# Patient Record
Sex: FEMALE | Race: BLACK OR AFRICAN AMERICAN | Employment: OTHER | URBAN - METROPOLITAN AREA
[De-identification: names, ages, dates, MRNs, and addresses within clinical notes are randomized per-mention and may not be internally consistent; named-entity substitution may affect disease eponyms.]

---

## 2017-01-25 RX ORDER — GLIPIZIDE 5 MG/1
TABLET ORAL
Qty: 60 TAB | Refills: 3 | Status: SHIPPED | COMMUNITY
Start: 2017-01-25 | End: 2017-05-24 | Stop reason: SDUPTHER

## 2017-01-31 RX ORDER — AMLODIPINE BESYLATE 10 MG/1
TABLET ORAL
Qty: 30 TAB | Refills: 2 | Status: SHIPPED | OUTPATIENT
Start: 2017-01-31 | End: 2017-04-18 | Stop reason: SDUPTHER

## 2017-02-26 DIAGNOSIS — E87.6 HYPOKALEMIA: ICD-10-CM

## 2017-02-26 RX ORDER — POTASSIUM CHLORIDE 1500 MG/1
TABLET, EXTENDED RELEASE ORAL
Qty: 60 TAB | Refills: 3 | Status: SHIPPED | COMMUNITY
Start: 2017-02-26 | End: 2017-06-26 | Stop reason: SDUPTHER

## 2017-03-29 ENCOUNTER — OFFICE VISIT (OUTPATIENT)
Dept: FAMILY MEDICINE CLINIC | Age: 62
End: 2017-03-29

## 2017-03-29 VITALS
SYSTOLIC BLOOD PRESSURE: 138 MMHG | RESPIRATION RATE: 16 BRPM | OXYGEN SATURATION: 99 % | TEMPERATURE: 98.7 F | BODY MASS INDEX: 29.95 KG/M2 | HEIGHT: 63 IN | WEIGHT: 169 LBS | HEART RATE: 62 BPM | DIASTOLIC BLOOD PRESSURE: 74 MMHG

## 2017-03-29 DIAGNOSIS — B37.2 CANDIDIASIS, INTERTRIGO: ICD-10-CM

## 2017-03-29 DIAGNOSIS — E11.9 TYPE 2 DIABETES MELLITUS WITHOUT COMPLICATION, WITHOUT LONG-TERM CURRENT USE OF INSULIN (HCC): ICD-10-CM

## 2017-03-29 DIAGNOSIS — E89.0 HYPOTHYROIDISM, POSTSURGICAL: ICD-10-CM

## 2017-03-29 DIAGNOSIS — E78.5 HYPERLIPIDEMIA WITH TARGET LDL LESS THAN 100: ICD-10-CM

## 2017-03-29 DIAGNOSIS — I10 ESSENTIAL HYPERTENSION WITH GOAL BLOOD PRESSURE LESS THAN 130/80: Primary | ICD-10-CM

## 2017-03-29 RX ORDER — METFORMIN HYDROCHLORIDE 500 MG/1
TABLET ORAL
Qty: 180 TAB | Refills: 1 | Status: ON HOLD | OUTPATIENT
Start: 2017-03-29 | End: 2017-06-23

## 2017-03-29 RX ORDER — PRAVASTATIN SODIUM 10 MG/1
10 TABLET ORAL
Qty: 90 TAB | Refills: 1 | Status: SHIPPED | OUTPATIENT
Start: 2017-03-29 | End: 2017-10-26 | Stop reason: SDUPTHER

## 2017-03-29 NOTE — PROGRESS NOTES
Chief Complaint   Patient presents with    Diabetes     Follow up, Fasting     Cholesterol Problem    Hypertension    Thyroid Problem     1. Have you been to the ER, urgent care clinic since your last visit? Hospitalized since your last visit? No     2. Have you seen or consulted any other health care providers outside of the [address] [place] [address] since your last visit? Include any pap smears or colon screening.    No

## 2017-03-29 NOTE — PATIENT INSTRUCTIONS

## 2017-03-29 NOTE — MR AVS SNAPSHOT
Visit Information Date & Time Provider Department Dept. Phone Encounter #  
 3/29/2017 10:00 AM Karina Leigh, [address] [place] [address] [id number] Follow-up Instructions Return in about 4 months (around 7/29/2017) for fasting, DM, HTN, CHO. Upcoming Health Maintenance Date Due  
 EYE EXAM RETINAL OR DILATED Q1 3/15/1963 Pneumococcal 19-64 Highest Risk (1 of 3 - PCV13) 3/15/1972 DTaP/Tdap/Td series (1 - Tdap) 3/15/1974 ZOSTER VACCINE AGE 60> 3/15/2013 HEMOGLOBIN A1C Q6M 5/28/2017 FOOT EXAM Q1 7/26/2017 MICROALBUMIN Q1 7/26/2017 LIPID PANEL Q1 11/28/2017 PAP AKA CERVICAL CYTOLOGY 5/27/2018 COLONOSCOPY 7/23/2018 BREAST CANCER SCRN MAMMOGRAM 11/28/2018 Allergies as of 3/29/2017  Review Complete On: 3/29/2017 By: Karina Leigh MD  
 No Known Allergies Current Immunizations  Reviewed on 11/28/2016 Name Date Influenza Vaccine (Quad) PF 11/28/2016, 11/24/2015, 10/28/2014 Not reviewed this visit You Were Diagnosed With   
  
 Codes Comments Essential hypertension with goal blood pressure less than 130/80    -  Primary ICD-10-CM: I10 
ICD-9-CM: 401.9 Type 2 diabetes mellitus without complication, without long-term current use of insulin (HCC)     ICD-10-CM: E11.9 ICD-9-CM: 250.00 Hypothyroidism, postsurgical     ICD-10-CM: E89.0 ICD-9-CM: 244.0 Hyperlipidemia with target LDL less than 100     ICD-10-CM: E78.5 ICD-9-CM: 272.4 Candidiasis, intertrigo     ICD-10-CM: B37.2 ICD-9-CM: 112.3 Vitals BP Pulse Temp Resp Height(growth percentile) Weight(growth percentile) 138/74 (BP 1 Location: Right arm, BP Patient Position: Sitting) 62 98.7 °F (37.1 °C) (Oral) 16 5' 3\" (1.6 m) 169 lb (76.7 kg) SpO2 BMI OB Status Smoking Status 99% 29.94 kg/m2 Postmenopausal Never Smoker Vitals History BMI and BSA Data  Body Mass Index Body Surface Area  
 29.94 kg/m 2 1.85 m 2  
  
  
 Preferred Pharmacy Pharmacy Name Phone [facility]/PHARMACY #[id number] Frankel, [address]  [address] [phone] Your Updated Medication List  
  
   
This list is accurate as of: 3/29/17 11:17 AM.  Always use your most recent med list. amLODIPine 10 mg tablet Commonly known as:  Garcia Fanti TAKE 1 TABLET BY MOUTH EVERY DAY  
  
 aspirin delayed-release 81 mg tablet Take  by mouth daily. clotrimazole-betamethasone topical cream  
Commonly known as:  Benetta Euler Use twice  
  
 glipiZIDE 5 mg tablet Commonly known as:  GLUCOTROL  
TAKE 1 TABLET BY MOUTH BEFORE BREAKFAST AND DINNER. KLOR-CON M20 20 mEq tablet Generic drug:  potassium chloride TAKE 1 TABLET BY MOUTH TWICE A DAY  
  
 losartan-hydroCHLOROthiazide 100-12.5 mg per tablet Commonly known as:  HYZAAR  
TAKE ONE TABLET BY MOUTH DAILY  
  
 metFORMIN 500 mg tablet Commonly known as:  GLUCOPHAGE  
TAKE 1 TABLET BY MOUTH TWO (2) TIMES DAILY (WITH MEALS). pravastatin 10 mg tablet Commonly known as:  PRAVACHOL Take 1 Tab by mouth nightly. Prescriptions Sent to Pharmacy Refills  
 metFORMIN (GLUCOPHAGE) 500 mg tablet 1 Sig: TAKE 1 TABLET BY MOUTH TWO (2) TIMES DAILY (WITH MEALS). Class: Normal  
 Pharmacy: [facility]/pharmacy #[id number] Ph #: [phone]  
 pravastatin (PRAVACHOL) 10 mg tablet 1 Sig: Take 1 Tab by mouth nightly. Class: Normal  
 Pharmacy: [address] [place] [id number] [facility] Ph #: [phone] Route: Oral  
  
We Performed the Following HEMOGLOBIN A1C WITH EAG [31785 CPT(R)] LIPID PANEL [04233 CPT(R)] METABOLIC PANEL, COMPREHENSIVE [73107 CPT(R)] GA COLLECTION VENOUS BLOOD,VENIPUNCTURE [id number] CPT(R)] [place] HANDLG&/OR CONVEY OF SPEC FOR TR OFFICE TO LAB [97588 CPT(R)] TSH 3RD GENERATION [41385 CPT(R)] Follow-up Instructions Return in about 4 months (around 7/29/2017) for fasting, DM, HTN, CHO. Patient Instructions Learning About Diabetes Food Guidelines Your Care Instructions Meal planning is important to manage diabetes. It helps keep your blood sugar at a target level (which you set with your doctor). You don't have to eat special foods. You can eat what your family eats, including sweets once in a while. But you do have to pay attention to how often you eat and how much you eat of certain foods. You may want to work with a dietitian or a certified diabetes educator (CDE) to help you plan meals and snacks. A dietitian or CDE can also help you lose weight if that is one of your goals. What should you know about eating carbs? Managing the amount of carbohydrate (carbs) you eat is an important part of healthy meals when you have diabetes. Carbohydrate is found in many foods. · Learn which foods have carbs. And learn the amounts of carbs in different foods. ¨ Bread, cereal, pasta, and rice have about 15 grams of carbs in a serving. A serving is 1 slice of bread (1 ounce), ½ cup of cooked cereal, or 1/3 cup of cooked pasta or rice. ¨ Fruits have 15 grams of carbs in a serving. A serving is 1 small fresh fruit, such as an apple or orange; ½ of a banana; ½ cup of cooked or canned fruit; ½ cup of fruit juice; 1 cup of melon or raspberries; or 2 tablespoons of dried fruit. ¨ Milk and no-sugar-added yogurt have 15 grams of carbs in a serving. A serving is 1 cup of milk or 2/3 cup of no-sugar-added yogurt. ¨ Starchy vegetables have 15 grams of carbs in a serving. A serving is ½ cup of mashed potatoes or sweet potato; 1 cup winter squash; ½ of a small baked potato; ½ cup of cooked beans; or ½ cup cooked corn or green peas. · Learn how much carbs to eat each day and at each meal. A dietitian or CDE can teach you how to keep track of the amount of carbs you eat.  This is called carbohydrate counting. · If you are not sure how to count carbohydrate grams, use the Plate Method to plan meals. It is a good, quick way to make sure that you have a balanced meal. It also helps you spread carbs throughout the day. ¨ Divide your plate by types of foods. Put non-starchy vegetables on half the plate, meat or other protein food on one-quarter of the plate, and a grain or starchy vegetable in the final quarter of the plate. To this you can add a small piece of fruit and 1 cup of milk or yogurt, depending on how many carbs you are supposed to eat at a meal. 
· Try to eat about the same amount of carbs at each meal. Do not \"save up\" your daily allowance of carbs to eat at one meal. 
· Proteins have very little or no carbs per serving. Examples of proteins are beef, chicken, turkey, fish, eggs, tofu, cheese, cottage cheese, and peanut butter. A serving size of meat is 3 ounces, which is about the size of a deck of cards. Examples of meat substitute serving sizes (equal to 1 ounce of meat) are 1/4 cup of cottage cheese, 1 egg, 1 tablespoon of peanut butter, and ½ cup of tofu. How can you eat out and still eat healthy? · Learn to estimate the serving sizes of foods that have carbohydrate. If you measure food at home, it will be easier to estimate the amount in a serving of restaurant food. · If the meal you order has too much carbohydrate (such as potatoes, corn, or baked beans), ask to have a low-carbohydrate food instead. Ask for a salad or green vegetables. · If you use insulin, check your blood sugar before and after eating out to help you plan how much to eat in the future. · If you eat more carbohydrate at a meal than you had planned, take a walk or do other exercise. This will help lower your blood sugar. What else should you know? · Limit saturated fat, such as the fat from meat and dairy products.  This is a healthy choice because people who have diabetes are at higher risk of heart disease. So choose lean cuts of meat and nonfat or low-fat dairy products. Use olive or canola oil instead of butter or shortening when cooking. · Don't skip meals. Your blood sugar may drop too low if you skip meals and take insulin or certain medicines for diabetes. · Check with your doctor before you drink alcohol. Alcohol can cause your blood sugar to drop too low. Alcohol can also cause a bad reaction if you take certain diabetes medicines. Follow-up care is a key part of your treatment and safety. Be sure to make and go to all appointments, and call your doctor if you are having problems. It's also a good idea to know your test results and keep a list of the medicines you take. Where can you learn more? Go to http://jania-chris.info/. Enter O201 in the search box to learn more about \"Learning About Diabetes Food Guidelines. \" Current as of: May 23, 2016 Content Version: 11.2 © 1792-0598 [employer]. Care instructions adapted under license by [employer] (which disclaims liability or warranty for this information). If you have questions about a medical condition or this instruction, always ask your healthcare professional. [place] [address] any warranty or liability for your use of this information. Introducing [facility]! [address] introduces [employer] patient portal. Now you can access parts of your medical record, email your doctor's office, and request medication refills online. 1. In your internet browser, go to [email or web address] 2. Click on the First Time User? Click Here link in the Sign In box. You will see the New Member Sign Up page. 3. Enter your Sign[employer] Access Code exactly as it appears below. You will not need to use this code after youve completed the sign-up process. If you do not sign up before the expiration date, you must request a new code. · [employer] Access Code: [email or web address] Expires: 6/27/2017 11:17 AM 
 
4. Enter the last four digits of your Social Security Number (xxxx) and Date of Birth (mm/dd/yyyy) as indicated and click Submit. You will be taken to the next sign-up page. 5. Create a [employer] ID. This will be your [employer] login ID and cannot be changed, so think of one that is secure and easy to remember. 6. Create a [employer] password. You can change your password at any time. 7. Enter your Password Reset Question and Answer. This can be used at a later time if you forget your password. 8. Enter your e-mail address. You will receive e-mail notification when new information is available in [address] 9. Click Sign Up. You can now view and download portions of your medical record. 10. Click the Download Summary menu link to download a portable copy of your medical information. If you have questions, please visit the Frequently Asked Questions section of the [employer] website. Remember, [employer] is NOT to be used for urgent needs. For medical emergencies, dial 911. Now available from your iPhone and Android! Please provide this summary of care documentation to your next provider. Your primary care clinician is listed as Kelsie Bahena. If you have any questions after today's visit, please call [phone].

## 2017-03-29 NOTE — PROGRESS NOTES
HISTORY OF PRESENT ILLNESS  Viviana Santiago is a 57 y.o. female. She was seen for 4 months follow up on diabetes, hypertension, dyslipidemia. Also h/o subtotal thyroidectomy. HPI  Cardiovascular Review  The patient has hypertension and hyperlipidemia. She reports taking medications as instructed, no medication side effects noted, home BP monitoring in range of 022-044'W systolic over 03-24'H diastolic, no TIA's, no chest pain on exertion, no dyspnea on exertion, no swelling of ankles, no orthostatic dizziness or lightheadedness, no orthopnea or paroxysmal nocturnal dyspnea, no palpitations, no muscle aches or pain. Diet and Lifestyle: generally follows a low fat low cholesterol diet, generally follows a low sodium diet, sedentary. Lab review: labs reviewed and discussed with patient. Medicines: Amlodipine 10 mg, Losartan/hctz 100/12.5, Pravastatin 10 mg, ASA 81 mg  She was referred to cardiology for abnormal EKG, had echocardiogram in 06/2015, normal result    Lab Results   Component Value Date/Time    Cholesterol, total 192 11/28/2016 10:57 AM    HDL Cholesterol 58 11/28/2016 10:57 AM    LDL, calculated 116 11/28/2016 10:57 AM    VLDL, calculated 18 11/28/2016 10:57 AM    Triglyceride 91 11/28/2016 10:57 AM         Endocrine Review  She is seen for diabetes. Since last visit she reports no polyuria or polydipsia, no chest pain, dyspnea or TIA's, no numbness, tingling or pain in extremities, no unusual visual symptoms, no hypoglycemia, weight has decreased. Home glucose monitoring: is performed regularly, fasting values range 100-120. She reports medication compliance: compliant all of the time. Medication side effects: none. Diabetic diet compliance: compliant all of the time. Lab review: labs reviewed and discussed with patient. Eye exam: just had last week, will give call with name of provider.  Urine negative for protein in 07/2016  On Glipizide 5 mg BID and Metformin 500 mg BID  Lab Results Component Value Date/Time    Hemoglobin A1c 6.9 11/28/2016 10:57 AM        Endocrine Review  Patient is seen for followup of hypothyroidism and status post thyroid surgery sub total thyroidectomy. she also had parathyroidectomy at same time  Since last visit: no change   She reports medication compliance: n/a as she is not on Rx  She reports the following concerns/problems/med side effects: n/a. Lab review: labs reviewed and discussed with patient. Lab Results   Component Value Date/Time    TSH 1.710 11/28/2016 10:57 AM    T4, Free 1.25 11/28/2016 10:57 AM       Review of Systems   Constitutional: Negative for chills, fever and malaise/fatigue. HENT: Negative for congestion, ear pain, sore throat and tinnitus. Eyes: Negative for blurred vision, double vision, pain and discharge. Respiratory: Negative for cough, shortness of breath and wheezing. Cardiovascular: Negative for chest pain, palpitations and leg swelling. Gastrointestinal: Negative for abdominal pain, blood in stool, constipation, diarrhea, nausea and vomiting. Genitourinary: Negative for dysuria, frequency, hematuria and urgency. Musculoskeletal: Negative for back pain, joint pain and myalgias. Skin: Negative for rash. Neurological: Negative for dizziness, tremors, seizures and headaches. Endo/Heme/Allergies: Negative for polydipsia. Does not bruise/bleed easily. Psychiatric/Behavioral: Negative for depression and substance abuse. The patient is not nervous/anxious. Physical Exam   Constitutional: She is oriented to person, place, and time. She appears well-developed and well-nourished. HENT:   Head: Normocephalic and atraumatic. Right Ear: External ear normal.   Mouth/Throat: Oropharynx is clear and moist. No oropharyngeal exudate. Eyes: Conjunctivae and EOM are normal. Pupils are equal, round, and reactive to light. No scleral icterus. Neck: Normal range of motion. Neck supple. No JVD present.  No thyromegaly present. Scar of thyroid surgery   Cardiovascular: Normal rate, regular rhythm, normal heart sounds and intact distal pulses. No murmur heard. Pulmonary/Chest: Effort normal and breath sounds normal. She has no wheezes. Abdominal: Soft. Bowel sounds are normal. She exhibits no distension and no mass. Musculoskeletal: Normal range of motion. She exhibits no edema or tenderness. Feet:warm, good capillary refill, no trophic changes or ulcerative lesions, normal DP and PT pulses, normal monofilament exam and normal sensory exam   Lymphadenopathy:     She has no cervical adenopathy. Neurological: She is alert and oriented to person, place, and time. She has normal reflexes. No cranial nerve deficit. Skin: Skin is warm and dry. No rash noted. She is not diaphoretic. Erythematous lesions with raised borders , between buttocks   Psychiatric: She has a normal mood and affect. Nursing note and vitals reviewed. ASSESSMENT and Lynette Andrew was seen today for diabetes, cholesterol problem, hypertension and thyroid problem. Diagnoses and all orders for this visit:    Essential hypertension with goal blood pressure less than 130/80  -     [place] HANDLG&/OR CONVEY OF SPEC FOR TR OFFICE TO LAB  -     [place] COLLECTION VENOUS BLOOD,VENIPUNCTURE  -     METABOLIC PANEL, COMPREHENSIVE    Type 2 diabetes mellitus without complication, without long-term current use of insulin (HCC)  -     metFORMIN (GLUCOPHAGE) 500 mg tablet; TAKE 1 TABLET BY MOUTH TWO (2) TIMES DAILY (WITH MEALS).   -     KY HANDLG&/OR CONVEY OF SPEC FOR TR OFFICE TO LAB  -     KY COLLECTION VENOUS BLOOD,VENIPUNCTURE  -     METABOLIC PANEL, COMPREHENSIVE  -     HEMOGLOBIN A1C WITH EAG    Hypothyroidism, postsurgical  -     KY HANDLG&/OR CONVEY OF SPEC FOR TR OFFICE TO LAB  -     [place] COLLECTION VENOUS BLOOD,VENIPUNCTURE  -     METABOLIC PANEL, COMPREHENSIVE  -     TSH 3RD GENERATION    Hyperlipidemia with target LDL less than 100  -     pravastatin (PRAVACHOL) 10 mg tablet; Take 1 Tab by mouth nightly. -     [place] HANDLG&/OR CONVEY OF SPEC FOR TR OFFICE TO LAB  -     AK COLLECTION VENOUS BLOOD,VENIPUNCTURE  -     METABOLIC PANEL, COMPREHENSIVE  -     LIPID PANEL    Candidiasis, intertrigo    Discussed lifestyle issues and health guidance given  Patient was given an after visit summary which includes diagnoses, vital signs, current medications, instructions and references & authorized prescriptions . Results of labs will be conveyed to patient, once available. Pt verbalized instructions I provided and expressed understanding of discussion that was held today. Follow-up Disposition:  Return in about 4 months (around 7/29/2017) for fasting, DM, HTN, CHO.

## 2017-03-30 LAB
ALBUMIN SERPL-MCNC: 4.1 G/DL (ref 3.6–4.8)
ALBUMIN/GLOB SERPL: 0.9 {RATIO} (ref 1.2–2.2)
ALP SERPL-CCNC: 109 IU/L (ref 39–117)
ALT SERPL-CCNC: 54 IU/L (ref 0–32)
AST SERPL-CCNC: 36 IU/L (ref 0–40)
BILIRUB SERPL-MCNC: 0.4 MG/DL (ref 0–1.2)
BUN SERPL-MCNC: 18 MG/DL (ref 8–27)
BUN/CREAT SERPL: 20 (ref 11–26)
CALCIUM SERPL-MCNC: 10.6 MG/DL (ref 8.7–10.3)
CHLORIDE SERPL-SCNC: 97 MMOL/L (ref 96–106)
CHOLEST SERPL-MCNC: 147 MG/DL (ref 100–199)
CO2 SERPL-SCNC: 28 MMOL/L (ref 18–29)
CREAT SERPL-MCNC: 0.92 MG/DL (ref 0.57–1)
EST. AVERAGE GLUCOSE BLD GHB EST-MCNC: 143 MG/DL
GLOBULIN SER CALC-MCNC: 4.5 G/DL (ref 1.5–4.5)
GLUCOSE SERPL-MCNC: 137 MG/DL (ref 65–99)
HBA1C MFR BLD: 6.6 % (ref 4.8–5.6)
HDLC SERPL-MCNC: 50 MG/DL
INTERPRETATION, 910389: NORMAL
LDLC SERPL CALC-MCNC: 78 MG/DL (ref 0–99)
Lab: NORMAL
POTASSIUM SERPL-SCNC: 3.6 MMOL/L (ref 3.5–5.2)
PROT SERPL-MCNC: 8.6 G/DL (ref 6–8.5)
SODIUM SERPL-SCNC: 141 MMOL/L (ref 134–144)
TRIGL SERPL-MCNC: 93 MG/DL (ref 0–149)
TSH SERPL DL<=0.005 MIU/L-ACNC: 1.12 UIU/ML (ref 0.45–4.5)
VLDLC SERPL CALC-MCNC: 19 MG/DL (ref 5–40)

## 2017-04-18 RX ORDER — AMLODIPINE BESYLATE 10 MG/1
TABLET ORAL
Qty: 30 TAB | Refills: 2 | Status: SHIPPED | OUTPATIENT
Start: 2017-04-18 | End: 2017-07-03 | Stop reason: SDUPTHER

## 2017-05-08 RX ORDER — LOSARTAN POTASSIUM AND HYDROCHLOROTHIAZIDE 12.5; 1 MG/1; MG/1
TABLET ORAL
Qty: 30 TAB | Refills: 5 | Status: SHIPPED | OUTPATIENT
Start: 2017-05-08 | End: 2017-10-23 | Stop reason: SDUPTHER

## 2017-05-29 RX ORDER — GLIPIZIDE 5 MG/1
TABLET ORAL
Qty: 60 TAB | Refills: 3 | Status: SHIPPED | OUTPATIENT
Start: 2017-05-29 | End: 2017-09-30 | Stop reason: SDUPTHER

## 2017-06-21 ENCOUNTER — APPOINTMENT (OUTPATIENT)
Dept: CT IMAGING | Age: 62
End: 2017-06-21
Attending: EMERGENCY MEDICINE
Payer: COMMERCIAL

## 2017-06-21 ENCOUNTER — APPOINTMENT (OUTPATIENT)
Dept: GENERAL RADIOLOGY | Age: 62
End: 2017-06-21
Attending: EMERGENCY MEDICINE
Payer: COMMERCIAL

## 2017-06-21 ENCOUNTER — HOSPITAL ENCOUNTER (OUTPATIENT)
Age: 62
Discharge: LEFT BEFORE DISCHARGE | End: 2017-06-21
Attending: EMERGENCY MEDICINE | Admitting: HOSPITALIST
Payer: COMMERCIAL

## 2017-06-21 VITALS
DIASTOLIC BLOOD PRESSURE: 81 MMHG | HEIGHT: 64 IN | RESPIRATION RATE: 16 BRPM | SYSTOLIC BLOOD PRESSURE: 151 MMHG | BODY MASS INDEX: 28.24 KG/M2 | HEART RATE: 68 BPM | OXYGEN SATURATION: 96 % | WEIGHT: 165.4 LBS | TEMPERATURE: 98.1 F

## 2017-06-21 DIAGNOSIS — R26.81 UNSTEADY GAIT: Primary | ICD-10-CM

## 2017-06-21 DIAGNOSIS — R20.0 NUMBNESS: ICD-10-CM

## 2017-06-21 LAB
ALBUMIN SERPL BCP-MCNC: 3.6 G/DL (ref 3.5–5)
ALBUMIN/GLOB SERPL: 0.6 {RATIO} (ref 1.1–2.2)
ALP SERPL-CCNC: 109 U/L (ref 45–117)
ALT SERPL-CCNC: 19 U/L (ref 12–78)
ANION GAP BLD CALC-SCNC: 7 MMOL/L (ref 5–15)
AST SERPL W P-5'-P-CCNC: 25 U/L (ref 15–37)
BASOPHILS # BLD AUTO: 0 K/UL (ref 0–0.1)
BASOPHILS # BLD: 0 % (ref 0–1)
BILIRUB SERPL-MCNC: 0.4 MG/DL (ref 0.2–1)
BUN SERPL-MCNC: 17 MG/DL (ref 6–20)
BUN/CREAT SERPL: 15 (ref 12–20)
CALCIUM SERPL-MCNC: 10.3 MG/DL (ref 8.5–10.1)
CHLORIDE SERPL-SCNC: 100 MMOL/L (ref 97–108)
CO2 SERPL-SCNC: 29 MMOL/L (ref 21–32)
CREAT SERPL-MCNC: 1.15 MG/DL (ref 0.55–1.02)
EOSINOPHIL # BLD: 0.1 K/UL (ref 0–0.4)
EOSINOPHIL NFR BLD: 2 % (ref 0–7)
ERYTHROCYTE [DISTWIDTH] IN BLOOD BY AUTOMATED COUNT: 12.3 % (ref 11.5–14.5)
GLOBULIN SER CALC-MCNC: 5.8 G/DL (ref 2–4)
GLUCOSE SERPL-MCNC: 169 MG/DL (ref 65–100)
HCT VFR BLD AUTO: 39.5 % (ref 35–47)
HGB BLD-MCNC: 13.2 G/DL (ref 11.5–16)
LYMPHOCYTES # BLD AUTO: 39 % (ref 12–49)
LYMPHOCYTES # BLD: 2.4 K/UL (ref 0.8–3.5)
MCH RBC QN AUTO: 29.9 PG (ref 26–34)
MCHC RBC AUTO-ENTMCNC: 33.4 G/DL (ref 30–36.5)
MCV RBC AUTO: 89.6 FL (ref 80–99)
MONOCYTES # BLD: 0.6 K/UL (ref 0–1)
MONOCYTES NFR BLD AUTO: 10 % (ref 5–13)
NEUTS SEG # BLD: 3.2 K/UL (ref 1.8–8)
NEUTS SEG NFR BLD AUTO: 49 % (ref 32–75)
PLATELET # BLD AUTO: 205 K/UL (ref 150–400)
POTASSIUM SERPL-SCNC: 4 MMOL/L (ref 3.5–5.1)
PROT SERPL-MCNC: 9.4 G/DL (ref 6.4–8.2)
RBC # BLD AUTO: 4.41 M/UL (ref 3.8–5.2)
SODIUM SERPL-SCNC: 136 MMOL/L (ref 136–145)
WBC # BLD AUTO: 6.3 K/UL (ref 3.6–11)

## 2017-06-21 PROCEDURE — 36415 COLL VENOUS BLD VENIPUNCTURE: CPT | Performed by: EMERGENCY MEDICINE

## 2017-06-21 PROCEDURE — 71020 XR CHEST PA LAT: CPT

## 2017-06-21 PROCEDURE — 85025 COMPLETE CBC W/AUTO DIFF WBC: CPT | Performed by: EMERGENCY MEDICINE

## 2017-06-21 PROCEDURE — 99284 EMERGENCY DEPT VISIT MOD MDM: CPT

## 2017-06-21 PROCEDURE — 80053 COMPREHEN METABOLIC PANEL: CPT | Performed by: EMERGENCY MEDICINE

## 2017-06-21 PROCEDURE — 70450 CT HEAD/BRAIN W/O DYE: CPT

## 2017-06-21 PROCEDURE — 99218 HC RM OBSERVATION: CPT

## 2017-06-21 NOTE — PROGRESS NOTES
Admission Medication Reconciliation:    Information obtained from: Patient, RX Query    Significant PMH/Disease States:   Past Medical History:   Diagnosis Date    Diabetes ([facility] [address]     Hypertension     Echo 6-25-15- borderline LVH, normal EF- CAV     Hypothyroid        Chief Complaint for this Admission:  Right facial/arm numbness and tingling x 1 week    Allergies:  Review of patient's allergies indicates no known allergies. Prior to Admission Medications:   Prior to Admission Medications   Prescriptions Last Dose Informant Patient Reported? Taking? KLOR-CON M20 20 mEq tablet 6/21/2017 at 0800  No Yes   Sig: TAKE 1 TABLET BY MOUTH TWICE A DAY   amLODIPine (NORVASC) 10 mg tablet 6/21/2017 at 0900  No Yes   Sig: TAKE 1 TABLET BY MOUTH EVERY DAY   aspirin delayed-release 81 mg tablet 6/21/2017 at 0900  Yes Yes   Sig: Take  by mouth daily. glipiZIDE (GLUCOTROL) 5 mg tablet 6/21/2017 at 0800  No Yes   Sig: TAKE 1 TABLET BY MOUTH BEFORE BREAKFAST AND DINNER. losartan-hydroCHLOROthiazide (HYZAAR) 100-12.5 mg per tablet 6/21/2017 at 0800  No Yes   Sig: TAKE ONE TABLET BY MOUTH DAILY   metFORMIN (GLUCOPHAGE) 500 mg tablet 6/21/2017 at 0800  No Yes   Sig: TAKE 1 TABLET BY MOUTH TWO (2) TIMES DAILY (WITH MEALS). pravastatin (PRAVACHOL) 10 mg tablet 6/20/2017 at 2000  No Yes   Sig: Take 1 Tab by mouth nightly. Facility-Administered Medications: None         Comments/Recommendations:   Deleted:  1. Lotrisone (uses \"occasionally\")    Thank you.

## 2017-06-21 NOTE — ED NOTES
\" I need to go to the bathroom before you get started\". Patient ambulated to the bathroom steady gait.

## 2017-06-21 NOTE — ED PROVIDER NOTES
HPI Comments: 57 y.o. female with past medical history significant for HTN, DM, hypothyroidism, and is s/p cholecystectomy, thyroidectomy, and parathyroidectomy who presents from home via private vehicle with chief complaint of right arm numbness. Pt reports numbness over her entire right arm from shoulder to fingers that is like \"playing in sand\" for the last 1 - 2 weeks. She also notes an unsteady gait every time she stands up, not feeling as if she is leaning to one side or the other, and blurry vision in all fields over the same time period. Pt has not yet been evaluated by another physician for this complaint. Pt denies the following: weakness, light-headedness, LOC, speech difficulty, fever, chills, rhinorrhea, sore throat, cough, SOB, abdominal pain, and any changes to urine or bowels. There are no other acute medical concerns at this time. Social hx: Never smoker. No alcohol use. PCP: Keegan Juarez MD    Note written by Juliette Beck, as dictated by Kassy Herndon MD 11:28 AM     The history is provided by the patient. Past Medical History:   Diagnosis Date    Diabetes ([facility] [address]     Hypertension     Echo 6-25-15- borderline LVH, normal EF- CAV     Hypothyroid        Past Surgical History:   Procedure Laterality Date    HX  SECTION          HX CHOLECYSTECTOMY      HX COLONOSCOPY      HX PARATHYROIDECTOMY      had 2 glands removed    HX THYROIDECTOMY           Family History:   Problem Relation Age of Onset    Hypertension Mother     Hypertension Brother     Hypertension Maternal Aunt     No Known Problems Father        Social History     Social History    Marital status:      Spouse name: N/A    Number of children: N/A    Years of education: N/A     Occupational History    Not on file.      Social History Main Topics    Smoking status: Never Smoker    Smokeless tobacco: Never Used    Alcohol use No    Drug use: No    Sexual activity: Not on file     Other Topics Concern    Not on file     Social History Narrative         ALLERGIES: Review of patient's allergies indicates no known allergies. Review of Systems   Constitutional: Negative for chills and fever. HENT: Negative for rhinorrhea and sore throat. Eyes: Positive for visual disturbance (blurry). Respiratory: Negative for cough and shortness of breath. Cardiovascular: Negative for chest pain. Gastrointestinal: Negative for abdominal pain, diarrhea, nausea and vomiting. Genitourinary: Negative for dysuria and urgency. Musculoskeletal: Positive for gait problem (unsteady). Negative for arthralgias and back pain. Skin: Negative for rash. Neurological: Positive for numbness (right arm). Negative for dizziness, weakness and light-headedness. All other systems reviewed and are negative.       Vitals:    06/21/17 1054 06/21/17 1112   BP: (!) 194/98 159/86   Pulse: 70    Resp: 18    Temp: 98 °F (36.7 °C)    SpO2: 97%    Weight: 75 kg (165 lb 6.4 oz)    Height: 5' 3.5\" (1.613 m)             Physical Exam     Const:  No acute distress, well developed, well nourished  Head:  Atraumatic, normocephalic  Eyes:  PERRL, conjunctiva normal, no scleral icterus  Neck:  Supple, trachea midline  Cardiovascular:  RRR, no murmurs, no gallops, no rubs  Resp:  No resp distress, no increased work of breathing, no wheezes, no rhonchi, no rales,  Abd:  Soft, non-tender, non-distended, no rebound, no guarding, no CVA tenderness  :  Deferred  MSK:  No pedal edema, normal ROM  Neuro:  Alert and oriented x3, no cranial nerve defect  Skin:  Warm, dry, intact  Psych: normal mood and affect, behavior is normal, judgement and thought content is normal    Note written by Juliette Clarke, as dictated by Efrain Resendez MD 11:28 AM      MDM  Number of Diagnoses or Management Options  Numbness:   Unsteady gait:      Amount and/or Complexity of Data Reviewed  Clinical lab tests: ordered and reviewed  Tests in the radiology section of CPT®: ordered and reviewed  Review and summarize past medical records: yes    Patient Progress  Patient progress: stable    ED Course     Pt. Presents to the ER with difficulty walking and right sided numbness. No mass or bleed on head CT. Pt. Recommended to be admitted for stroke work-up. She declined. She says that she plans to return tomorrow morning. I told her that she is at risk for permanent disability or death. Pt. Still wishes to leave. Procedures      CONSULT NOTE:  1:08 PM Maximiliano Ayala MD spoke with Dr. Fay Espinoza, Consult for Hospitalist.  Discussed available diagnostic tests and clinical findings. He is in agreement with care plans as outlined. Dr. Fay Espinoza will come to see the pt and plans to admit. PROGRESS NOTE:  1:21 PM  Pt was seen by Dr. Fay Espinoza but reports that she is unable to stay in the hospital as she is the caregiver for her ill . She understands the risk that if she goes home she could have another stroke and die. Pt will be leaving AMA and will try to f/u with PCP or come back to the ED tomorrow.

## 2017-06-21 NOTE — ED TRIAGE NOTES
Pt arrives with unsteady gait, R facial and R arm numbness and tingling x 1 week. Denies history of stroke. VSS.

## 2017-06-21 NOTE — ED NOTES
Pt ambulatory out of unit. VSS. Pt understanding that if she comes back tomorrow to follow up on care then she will need to be reevaluated.

## 2017-06-21 NOTE — ED NOTES
Verbal shift change report given to Camron Worley RN & Juan Estrada RN (oncoming nurse) by Ingrid Patel RN (offgoing nurse). Report included the following information SBAR, ED Summary, MAR and Recent Results.

## 2017-06-21 NOTE — Clinical Note
Patient Class[de-identified] Observation [373] Type of Bed: Telemetry [19] Reason for Observation: unsteady gait, righ face and arm numbness Admitting Physician: Estefanía Singh [[id number]] Attending Physician: Estefanía Singh [[id number]] Diagnosis: stroke work up

## 2017-06-21 NOTE — ED NOTES
Pt reports that she is unable to stay overnight due to her being the primary caregiver of her , who is disabled. MD notified and AMA form signed.

## 2017-06-21 NOTE — PROGRESS NOTES
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 I explained to her the importance of inpatient work up, she understood but because of her home situation she said she can't stay and wants to go home. Case discussed with the ER physician and he said he will talk to her.    Discharge per ER physician   Dr Flaquito Hernandez

## 2017-06-22 ENCOUNTER — APPOINTMENT (OUTPATIENT)
Dept: MRI IMAGING | Age: 62
End: 2017-06-22
Attending: FAMILY MEDICINE
Payer: COMMERCIAL

## 2017-06-22 ENCOUNTER — HOSPITAL ENCOUNTER (OUTPATIENT)
Age: 62
Setting detail: OBSERVATION
Discharge: HOME | End: 2017-06-23
Attending: STUDENT IN AN ORGANIZED HEALTH CARE EDUCATION/TRAINING PROGRAM | Admitting: FAMILY MEDICINE
Payer: COMMERCIAL

## 2017-06-22 DIAGNOSIS — E11.9 TYPE 2 DIABETES MELLITUS WITHOUT COMPLICATION, WITHOUT LONG-TERM CURRENT USE OF INSULIN (HCC): ICD-10-CM

## 2017-06-22 DIAGNOSIS — R26.9 GAIT ABNORMALITY: ICD-10-CM

## 2017-06-22 DIAGNOSIS — I63.9 CEREBROVASCULAR ACCIDENT (CVA), UNSPECIFIED MECHANISM (HCC): Primary | ICD-10-CM

## 2017-06-22 DIAGNOSIS — R20.0 NUMBNESS: ICD-10-CM

## 2017-06-22 DIAGNOSIS — R42 DIZZINESS: ICD-10-CM

## 2017-06-22 LAB
ALBUMIN SERPL BCP-MCNC: 3.5 G/DL (ref 3.5–5)
ALBUMIN SERPL BCP-MCNC: 3.7 G/DL (ref 3.5–5)
ALBUMIN/GLOB SERPL: 0.6 {RATIO} (ref 1.1–2.2)
ALBUMIN/GLOB SERPL: 0.7 {RATIO} (ref 1.1–2.2)
ALP SERPL-CCNC: 106 U/L (ref 45–117)
ALP SERPL-CCNC: 108 U/L (ref 45–117)
ALT SERPL-CCNC: 16 U/L (ref 12–78)
ALT SERPL-CCNC: 18 U/L (ref 12–78)
ANION GAP BLD CALC-SCNC: 9 MMOL/L (ref 5–15)
ANION GAP BLD CALC-SCNC: 9 MMOL/L (ref 5–15)
AST SERPL W P-5'-P-CCNC: 13 U/L (ref 15–37)
AST SERPL W P-5'-P-CCNC: 13 U/L (ref 15–37)
ATRIAL RATE: 61 BPM
BASOPHILS # BLD AUTO: 0 K/UL (ref 0–0.1)
BASOPHILS # BLD: 0 % (ref 0–1)
BILIRUB SERPL-MCNC: 0.6 MG/DL (ref 0.2–1)
BILIRUB SERPL-MCNC: 0.6 MG/DL (ref 0.2–1)
BUN SERPL-MCNC: 19 MG/DL (ref 6–20)
BUN SERPL-MCNC: 20 MG/DL (ref 6–20)
BUN/CREAT SERPL: 15 (ref 12–20)
BUN/CREAT SERPL: 15 (ref 12–20)
CALCIUM SERPL-MCNC: 10.1 MG/DL (ref 8.5–10.1)
CALCIUM SERPL-MCNC: 10.2 MG/DL (ref 8.5–10.1)
CALCULATED P AXIS, ECG09: 71 DEGREES
CALCULATED R AXIS, ECG10: 18 DEGREES
CALCULATED T AXIS, ECG11: 146 DEGREES
CHLORIDE SERPL-SCNC: 102 MMOL/L (ref 97–108)
CHLORIDE SERPL-SCNC: 103 MMOL/L (ref 97–108)
CO2 SERPL-SCNC: 25 MMOL/L (ref 21–32)
CO2 SERPL-SCNC: 26 MMOL/L (ref 21–32)
CREAT SERPL-MCNC: 1.31 MG/DL (ref 0.55–1.02)
CREAT SERPL-MCNC: 1.34 MG/DL (ref 0.55–1.02)
DIAGNOSIS, 93000: NORMAL
EOSINOPHIL # BLD: 0.1 K/UL (ref 0–0.4)
EOSINOPHIL NFR BLD: 1 % (ref 0–7)
ERYTHROCYTE [DISTWIDTH] IN BLOOD BY AUTOMATED COUNT: 12.3 % (ref 11.5–14.5)
GLOBULIN SER CALC-MCNC: 5.3 G/DL (ref 2–4)
GLOBULIN SER CALC-MCNC: 5.4 G/DL (ref 2–4)
GLUCOSE BLD STRIP.AUTO-MCNC: 110 MG/DL (ref 65–100)
GLUCOSE BLD STRIP.AUTO-MCNC: 94 MG/DL (ref 65–100)
GLUCOSE SERPL-MCNC: 118 MG/DL (ref 65–100)
GLUCOSE SERPL-MCNC: 135 MG/DL (ref 65–100)
HCT VFR BLD AUTO: 39.3 % (ref 35–47)
HGB BLD-MCNC: 12.9 G/DL (ref 11.5–16)
LYMPHOCYTES # BLD AUTO: 42 % (ref 12–49)
LYMPHOCYTES # BLD: 3.2 K/UL (ref 0.8–3.5)
MAGNESIUM SERPL-MCNC: 2 MG/DL (ref 1.6–2.4)
MCH RBC QN AUTO: 29.5 PG (ref 26–34)
MCHC RBC AUTO-ENTMCNC: 32.8 G/DL (ref 30–36.5)
MCV RBC AUTO: 89.9 FL (ref 80–99)
MONOCYTES # BLD: 0.4 K/UL (ref 0–1)
MONOCYTES NFR BLD AUTO: 5 % (ref 5–13)
NEUTS SEG # BLD: 4 K/UL (ref 1.8–8)
NEUTS SEG NFR BLD AUTO: 52 % (ref 32–75)
P-R INTERVAL, ECG05: 156 MS
PLATELET # BLD AUTO: 222 K/UL (ref 150–400)
POTASSIUM SERPL-SCNC: 3.2 MMOL/L (ref 3.5–5.1)
POTASSIUM SERPL-SCNC: 3.2 MMOL/L (ref 3.5–5.1)
PROT SERPL-MCNC: 8.9 G/DL (ref 6.4–8.2)
PROT SERPL-MCNC: 9 G/DL (ref 6.4–8.2)
Q-T INTERVAL, ECG07: 454 MS
QRS DURATION, ECG06: 88 MS
QTC CALCULATION (BEZET), ECG08: 457 MS
RBC # BLD AUTO: 4.37 M/UL (ref 3.8–5.2)
SERVICE CMNT-IMP: ABNORMAL
SERVICE CMNT-IMP: NORMAL
SODIUM SERPL-SCNC: 136 MMOL/L (ref 136–145)
SODIUM SERPL-SCNC: 138 MMOL/L (ref 136–145)
TSH SERPL DL<=0.05 MIU/L-ACNC: 1.64 UIU/ML (ref 0.36–3.74)
VENTRICULAR RATE, ECG03: 61 BPM
WBC # BLD AUTO: 7.7 K/UL (ref 3.6–11)

## 2017-06-22 PROCEDURE — 85025 COMPLETE CBC W/AUTO DIFF WBC: CPT | Performed by: STUDENT IN AN ORGANIZED HEALTH CARE EDUCATION/TRAINING PROGRAM

## 2017-06-22 PROCEDURE — 74011250636 HC RX REV CODE- 250/636: Performed by: FAMILY MEDICINE

## 2017-06-22 PROCEDURE — 36415 COLL VENOUS BLD VENIPUNCTURE: CPT | Performed by: STUDENT IN AN ORGANIZED HEALTH CARE EDUCATION/TRAINING PROGRAM

## 2017-06-22 PROCEDURE — 70551 MRI BRAIN STEM W/O DYE: CPT

## 2017-06-22 PROCEDURE — 93306 TTE W/DOPPLER COMPLETE: CPT

## 2017-06-22 PROCEDURE — 99218 HC RM OBSERVATION: CPT

## 2017-06-22 PROCEDURE — 99284 EMERGENCY DEPT VISIT MOD MDM: CPT

## 2017-06-22 PROCEDURE — 82962 GLUCOSE BLOOD TEST: CPT

## 2017-06-22 PROCEDURE — 83735 ASSAY OF MAGNESIUM: CPT | Performed by: STUDENT IN AN ORGANIZED HEALTH CARE EDUCATION/TRAINING PROGRAM

## 2017-06-22 PROCEDURE — 96361 HYDRATE IV INFUSION ADD-ON: CPT

## 2017-06-22 PROCEDURE — 93005 ELECTROCARDIOGRAM TRACING: CPT

## 2017-06-22 PROCEDURE — 84443 ASSAY THYROID STIM HORMONE: CPT | Performed by: FAMILY MEDICINE

## 2017-06-22 PROCEDURE — 93880 EXTRACRANIAL BILAT STUDY: CPT

## 2017-06-22 PROCEDURE — 74011250637 HC RX REV CODE- 250/637: Performed by: FAMILY MEDICINE

## 2017-06-22 PROCEDURE — 80053 COMPREHEN METABOLIC PANEL: CPT | Performed by: STUDENT IN AN ORGANIZED HEALTH CARE EDUCATION/TRAINING PROGRAM

## 2017-06-22 PROCEDURE — 80053 COMPREHEN METABOLIC PANEL: CPT | Performed by: EMERGENCY MEDICINE

## 2017-06-22 RX ORDER — ASPIRIN 81 MG/1
81 TABLET ORAL DAILY
Status: DISCONTINUED | OUTPATIENT
Start: 2017-06-23 | End: 2017-06-23 | Stop reason: HOSPADM

## 2017-06-22 RX ORDER — PRAVASTATIN SODIUM 40 MG/1
40 TABLET ORAL
Status: DISCONTINUED | OUTPATIENT
Start: 2017-06-22 | End: 2017-06-23 | Stop reason: HOSPADM

## 2017-06-22 RX ORDER — DEXTROSE 50 % IN WATER (D50W) INTRAVENOUS SYRINGE
12.5-25 AS NEEDED
Status: DISCONTINUED | OUTPATIENT
Start: 2017-06-22 | End: 2017-06-22

## 2017-06-22 RX ORDER — SODIUM CHLORIDE 0.9 % (FLUSH) 0.9 %
5-10 SYRINGE (ML) INJECTION EVERY 8 HOURS
Status: DISCONTINUED | OUTPATIENT
Start: 2017-06-22 | End: 2017-06-23 | Stop reason: HOSPADM

## 2017-06-22 RX ORDER — AMLODIPINE BESYLATE 5 MG/1
10 TABLET ORAL DAILY
Status: DISCONTINUED | OUTPATIENT
Start: 2017-06-23 | End: 2017-06-23 | Stop reason: HOSPADM

## 2017-06-22 RX ORDER — MAGNESIUM SULFATE 100 %
4 CRYSTALS MISCELLANEOUS AS NEEDED
Status: DISCONTINUED | OUTPATIENT
Start: 2017-06-22 | End: 2017-06-23 | Stop reason: HOSPADM

## 2017-06-22 RX ORDER — INSULIN LISPRO 100 [IU]/ML
INJECTION, SOLUTION INTRAVENOUS; SUBCUTANEOUS
Status: DISCONTINUED | OUTPATIENT
Start: 2017-06-22 | End: 2017-06-23 | Stop reason: HOSPADM

## 2017-06-22 RX ORDER — GLIPIZIDE 5 MG/1
5 TABLET ORAL
Status: DISCONTINUED | OUTPATIENT
Start: 2017-06-23 | End: 2017-06-23 | Stop reason: HOSPADM

## 2017-06-22 RX ORDER — LABETALOL HYDROCHLORIDE 5 MG/ML
5 INJECTION, SOLUTION INTRAVENOUS
Status: DISCONTINUED | OUTPATIENT
Start: 2017-06-22 | End: 2017-06-23 | Stop reason: HOSPADM

## 2017-06-22 RX ORDER — ENOXAPARIN SODIUM 100 MG/ML
40 INJECTION SUBCUTANEOUS EVERY 24 HOURS
Status: DISCONTINUED | OUTPATIENT
Start: 2017-06-22 | End: 2017-06-23 | Stop reason: HOSPADM

## 2017-06-22 RX ORDER — SODIUM CHLORIDE 0.9 % (FLUSH) 0.9 %
20 SYRINGE (ML) INJECTION
Status: COMPLETED | OUTPATIENT
Start: 2017-06-22 | End: 2017-06-22

## 2017-06-22 RX ORDER — ACETAMINOPHEN 650 MG/1
650 SUPPOSITORY RECTAL
Status: DISCONTINUED | OUTPATIENT
Start: 2017-06-22 | End: 2017-06-23 | Stop reason: HOSPADM

## 2017-06-22 RX ORDER — SODIUM CHLORIDE 0.9 % (FLUSH) 0.9 %
5-10 SYRINGE (ML) INJECTION AS NEEDED
Status: DISCONTINUED | OUTPATIENT
Start: 2017-06-22 | End: 2017-06-23 | Stop reason: HOSPADM

## 2017-06-22 RX ORDER — POTASSIUM CHLORIDE 750 MG/1
40 TABLET, FILM COATED, EXTENDED RELEASE ORAL
Status: COMPLETED | OUTPATIENT
Start: 2017-06-22 | End: 2017-06-22

## 2017-06-22 RX ORDER — SODIUM CHLORIDE 9 MG/ML
75 INJECTION, SOLUTION INTRAVENOUS CONTINUOUS
Status: DISCONTINUED | OUTPATIENT
Start: 2017-06-22 | End: 2017-06-23

## 2017-06-22 RX ORDER — ACETAMINOPHEN 325 MG/1
650 TABLET ORAL
Status: DISCONTINUED | OUTPATIENT
Start: 2017-06-22 | End: 2017-06-23 | Stop reason: HOSPADM

## 2017-06-22 RX ORDER — POTASSIUM CHLORIDE 750 MG/1
20 TABLET, FILM COATED, EXTENDED RELEASE ORAL 2 TIMES DAILY
Status: DISCONTINUED | OUTPATIENT
Start: 2017-06-22 | End: 2017-06-23 | Stop reason: HOSPADM

## 2017-06-22 RX ADMIN — Medication 10 ML: at 21:30

## 2017-06-22 RX ADMIN — POTASSIUM CHLORIDE 20 MEQ: 750 TABLET, FILM COATED, EXTENDED RELEASE ORAL at 16:32

## 2017-06-22 RX ADMIN — SODIUM CHLORIDE 75 ML/HR: 900 INJECTION, SOLUTION INTRAVENOUS at 16:32

## 2017-06-22 RX ADMIN — PRAVASTATIN SODIUM 40 MG: 40 TABLET ORAL at 21:29

## 2017-06-22 RX ADMIN — Medication 20 ML: at 13:44

## 2017-06-22 RX ADMIN — POTASSIUM CHLORIDE 40 MEQ: 750 TABLET, FILM COATED, EXTENDED RELEASE ORAL at 12:22

## 2017-06-22 RX ADMIN — Medication 10 ML: at 14:00

## 2017-06-22 NOTE — ED NOTES
Reports \"walking like I am drunk\" x 1 week. States gait veers to the right as the walks. Also has some tingling in right arm and hand. Denies all other symptoms.

## 2017-06-22 NOTE — H&P
Charity Ocasio MD  Please call  and page for questions. Call physician on-call through the  7pm-7am      History & Physical    Primary Care Provider: Mary Morales MD  Source of Information: Patient and her medical record. History of Presenting Illness:   Milli Ortiz is a 57 y.o. female with PMH of HTN, DM, and hypothyroidism,  who presented to the ED ambulatory today form home with gait abnormalities and weakness and numbness on her right side of the face and right arm. Patient said it has been going on for a week. Patient decided to come tot he ED yesterday but she could not stay at the hospital as she is taking care of her  at home. She is walking like a drunk person. No fall. She denied any change on speech, eating habit. She denied headache, vision problems. The patient denies any fever, chills, chest pain, cough, congestion, recent illness, palpitations. Patient said she urge to urinate but she does not make any urine when she goes to restroom. She denied diarrhea and constipation. Review of Systems:  A comprehensive review of systems was negative except for that written in the History of Present Illness. Past Medical History:   Diagnosis Date    Diabetes ([address]     Hypertension     Echo 6-25-15- borderline LVH, normal EF- CAV     Hypothyroid       Past Surgical History:   Procedure Laterality Date    HX  SECTION          HX CHOLECYSTECTOMY      HX COLONOSCOPY      HX PARATHYROIDECTOMY      had 2 glands removed    HX THYROIDECTOMY       Prior to Admission medications    Medication Sig Start Date End Date Taking?  Authorizing Provider   glipiZIDE (GLUCOTROL) 5 mg tablet TAKE 1 TABLET BY MOUTH BEFORE BREAKFAST AND DINNER. 17  Yes Mary Morales MD   losartan-hydroCHLOROthiazide (HYZAAR) 100-12.5 mg per tablet TAKE ONE TABLET BY MOUTH DAILY 17  Yes Mary Morales MD   amLODIPine (NORVASC) 10 mg tablet TAKE 1 TABLET BY MOUTH EVERY DAY 4/18/17  Yes Consuelo Rivera MD   metFORMIN (GLUCOPHAGE) 500 mg tablet TAKE 1 TABLET BY MOUTH TWO (2) TIMES DAILY (WITH MEALS). 3/29/17  Yes Consuelo Rivera MD   pravastatin (PRAVACHOL) 10 mg tablet Take 1 Tab by mouth nightly. 3/29/17  Yes Consuelo Rivera MD   KLOR-CON M20 20 mEq tablet TAKE 1 TABLET BY MOUTH TWICE A DAY 2/26/17  Yes Consuelo Rivera MD   aspirin delayed-release 81 mg tablet Take  by mouth daily. Yes Historical Provider     No Known Allergies   Family History   Problem Relation Age of Onset    Hypertension Mother     Hypertension Brother     Hypertension Maternal Aunt     No Known Problems Father         SOCIAL HISTORY:  Patient resides:  Independently X   Assisted Living    SNF    With family care       Smoking history:   None X   Former    Chronic      Alcohol history:   None X   Social    Chronic      Ambulates:   Independently X   w/cane    w/walker    w/wc    CODE STATUS:  DNR    Full X   Other      Objective:     Physical Exam:     Visit Vitals    /88 (BP 1 Location: Right arm, BP Patient Position: Sitting)    Pulse 66    Temp 98.4 °F (36.9 °C)    Resp 20    Ht 5' 3.5\" (1.613 m)    Wt 73.6 kg (162 lb 4.8 oz)    SpO2 97%    BMI 28.3 kg/m2      O2 Device: Room air    General:  Alert, cooperative, no distress, appears stated age. Head:  Normocephalic, without obvious abnormality, atraumatic. Eyes:  Conjunctivae/corneas clear. PERRL, EOMs intact. Nose: Nares normal. Septum midline. Neck: Supple, symmetrical, trachea midline, no adenopathy, thyroid: no enlargement/tenderness/nodules, no carotid bruit and no JVD. Back:   Symmetric, no curvature. ROM normal. No CVA tenderness. Chest wall:  No tenderness or deformity. Heart:  Regular rate and rhythm, S1, S2 normal, no murmur. Abdomen:   Soft, non-tender. Bowel sounds normal.    Extremities: Extremities normal, atraumatic, no cyanosis or edema.    Pulses: 2+ and symmetric all extremities. Skin: Skin color, texture, turgor normal. No rashes or lesions   Neurologic: CNII-XII intact. EKG:  normal EKG, normal sinus rhythm. Data Review:     Recent Days:  Recent Labs      06/22/17   1133  06/21/17   1120   WBC  7.7  6.3   HGB  12.9  13.2   HCT  39.3  39.5   PLT  222  205     Recent Labs      06/22/17   1133  06/22/17   1102  06/21/17   1120   NA  138  136  136   K  3.2*  3.2*  4.0   CL  103  102  100   CO2  26  25  29   GLU  118*  135*  169*   BUN  19  20  17   CREA  1.31*  1.34*  1.15*   CA  10.2*  10.1  10.3*   MG  2.0   --    --    ALB  3.5  3.7  3.6   SGOT  13*  13*  25   ALT  16  18  19     No results for input(s): PH, PCO2, PO2, HCO3, FIO2 in the last 72 hours. 24 Hour Results:  Recent Results (from the past 24 hour(s))   METABOLIC PANEL, COMPREHENSIVE    Collection Time: 06/22/17 11:02 AM   Result Value Ref Range    Sodium 136 136 - 145 mmol/L    Potassium 3.2 (L) 3.5 - 5.1 mmol/L    Chloride 102 97 - 108 mmol/L    CO2 25 21 - 32 mmol/L    Anion gap 9 5 - 15 mmol/L    Glucose 135 (H) 65 - 100 mg/dL    BUN 20 6 - 20 MG/DL    Creatinine 1.34 (H) 0.55 - 1.02 MG/DL    BUN/Creatinine ratio 15 12 - 20      GFR est AA 48 (L) >60 ml/min/1.73m2    GFR est non-AA 40 (L) >60 ml/min/1.73m2    Calcium 10.1 8.5 - 10.1 MG/DL    Bilirubin, total 0.6 0.2 - 1.0 MG/DL    ALT (SGPT) 18 12 - 78 U/L    AST (SGOT) 13 (L) 15 - 37 U/L    Alk.  phosphatase 108 45 - 117 U/L    Protein, total 9.0 (H) 6.4 - 8.2 g/dL    Albumin 3.7 3.5 - 5.0 g/dL    Globulin 5.3 (H) 2.0 - 4.0 g/dL    A-G Ratio 0.7 (L) 1.1 - 2.2     METABOLIC PANEL, COMPREHENSIVE    Collection Time: 06/22/17 11:33 AM   Result Value Ref Range    Sodium 138 136 - 145 mmol/L    Potassium 3.2 (L) 3.5 - 5.1 mmol/L    Chloride 103 97 - 108 mmol/L    CO2 26 21 - 32 mmol/L    Anion gap 9 5 - 15 mmol/L    Glucose 118 (H) 65 - 100 mg/dL    BUN 19 6 - 20 MG/DL    Creatinine 1.31 (H) 0.55 - 1.02 MG/DL    BUN/Creatinine ratio 15 12 - 20 GFR est AA 50 (L) >60 ml/min/1.73m2    GFR est non-AA 41 (L) >60 ml/min/1.73m2    Calcium 10.2 (H) 8.5 - 10.1 MG/DL    Bilirubin, total 0.6 0.2 - 1.0 MG/DL    ALT (SGPT) 16 12 - 78 U/L    AST (SGOT) 13 (L) 15 - 37 U/L    Alk. phosphatase 106 45 - 117 U/L    Protein, total 8.9 (H) 6.4 - 8.2 g/dL    Albumin 3.5 3.5 - 5.0 g/dL    Globulin 5.4 (H) 2.0 - 4.0 g/dL    A-G Ratio 0.6 (L) 1.1 - 2.2     CBC WITH AUTOMATED DIFF    Collection Time: 06/22/17 11:33 AM   Result Value Ref Range    WBC 7.7 3.6 - 11.0 K/uL    RBC 4.37 3.80 - 5.20 M/uL    HGB 12.9 11.5 - 16.0 g/dL    HCT 39.3 35.0 - 47.0 %    MCV 89.9 80.0 - 99.0 FL    MCH 29.5 26.0 - 34.0 PG    MCHC 32.8 30.0 - 36.5 g/dL    RDW 12.3 11.5 - 14.5 %    PLATELET 082 149 - 994 K/uL    NEUTROPHILS 52 32 - 75 %    LYMPHOCYTES 42 12 - 49 %    MONOCYTES 5 5 - 13 %    EOSINOPHILS 1 0 - 7 %    BASOPHILS 0 0 - 1 %    ABS. NEUTROPHILS 4.0 1.8 - 8.0 K/UL    ABS. LYMPHOCYTES 3.2 0.8 - 3.5 K/UL    ABS. MONOCYTES 0.4 0.0 - 1.0 K/UL    ABS. EOSINOPHILS 0.1 0.0 - 0.4 K/UL    ABS. BASOPHILS 0.0 0.0 - 0.1 K/UL   MAGNESIUM    Collection Time: 06/22/17 11:33 AM   Result Value Ref Range    Magnesium 2.0 1.6 - 2.4 mg/dL   TSH 3RD GENERATION    Collection Time: 06/22/17 11:33 AM   Result Value Ref Range    TSH 1.64 0.36 - 3.74 uIU/mL   EKG, 12 LEAD, INITIAL    Collection Time: 06/22/17 12:41 PM   Result Value Ref Range    Ventricular Rate 61 BPM    Atrial Rate 61 BPM    P-R Interval 156 ms    QRS Duration 88 ms    Q-T Interval 454 ms    QTC Calculation (Bezet) 457 ms    Calculated P Axis 71 degrees    Calculated R Axis 18 degrees    Calculated T Axis 146 degrees    Diagnosis       Normal sinus rhythm  Left ventricular hypertrophy with repolarization abnormality  No previous ECGs available  Confirmed by Christel Rankin M.D., Armin Briggs ([id number]) on 6/22/2017 12:49:46 PM           Imaging:     Assessment and Plan:       Abnormal gait with numbness of right side:  CT was negative.  Will get carotid doppler, MRI head and will consult neurology. Will get TTE. Admit for observation to the neuro floor and follow with PT/OT. Increase the statin to high intensity. Hypokalemia: replacing and monitor. Diabetes mellitus: Resume her home medicine and check A1C. SSI. Hypertension: BP is elevated. Will continue her home medicine, add labetalol as PRN. May need BB and or ACE depending upon the work up     See orders for other plans:   VTE prophylaxis: Lovenox  Code status: Full  Discussed plan of care with Patient/Family and Nurse   Pre-admission lived at home:   Disposition: To neuro floor. Discharge planning: possibly to home tomorrow after work up.            Signed By: Ester Gaston MD     June 22, 2017

## 2017-06-22 NOTE — CONSULTS
[address]   [address] [facility], [address]   [address] [facility]       Name:  Cristiano Doll   MR#:  [id number]   :  1955   Account #:  [de-identified]    Date of Consultation:  2017   Date of Adm:  2017       REQUESTING PHYSICIAN: Dr. Ilya Sanchez EVALUATION: Numbness on the right side. HISTORY OF PRESENT ILLNESS: The patient is a 77-year-old   female with past medical history of diabetes, hypertension,   hypothyroidism, who states that she has been feeling dizzy and   unsteady on her feet for the past 2 weeks. This has been intermittent. Yesterday, she developed numbness in the right side of her head,   going down into her right arm that prompted this hospitalization. She   was admitted for stroke/TIA workup. She states that if she is lying   down still, she feels comfortable, but if she gets up and starts to walk,   her symptoms return. She denies any headache, changes in vision,   swallowing ability, chest pain, shortness of breath, palpitations, nausea   or vomiting. PAST MEDICAL HISTORY: As mentioned above. ALLERGIES: NONE. FAMILY HISTORY: Hypertension in mother, brother and maternal   aunt. SOCIAL HISTORY: She is . No history of smoking or drug use. Her  is disabled and she is his caregiver. She does admit to a   lot of stress related to this. REVIEW OF SYSTEMS: As mentioned above. PHYSICAL EXAMINATION   GENERAL: The patient is alert, fully oriented. VITAL SIGNS: Blood pressure 168/88, temperature 98.4, pulse is 66. NEUROLOGIC: Speech is clear. Comprehension is normal. Pupils are   equal, round, reactive. Extraocular movements are full. Face   symmetric. Tongue is midline. Hearing is baseline. Muscle tone and   bulk is normal. Strength is normal in all extremities. Deep tendon   reflexes are 2/2 and symmetric. Toes are downgoing. Sensation is   intact. Gait is normal.   HEART: Regular rate. CHEST: Clear.    ABDOMEN: Soft, nontender, positive bowel sounds. EXTREMITIES: No edema. LABORATORY DATA: CBC normal. CHEMISTRY: Sodium 138,   potassium 3.2, BUN 19, creatinine 1.31, AST 13, ALT 16. MRI scan of the brain showed chronic nonspecific white matter   changes. 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 Carotid ultrasound did not show any significant stenosis. Echocardiogram did not show any cardiac sources of emboli. ASSESSMENT AND PLAN: A 58-year-old female with diabetes,   hypertension, coming in with intermittent symptoms of dizziness,   unsteady gait and numbness in the right side of the head and arm. Her workup is negative. For secondary stroke prophylaxis. Irecommend continuing   Aspirin and statin along with risk factor control. She reports that she may have decreased hearing on the right   side along with occasional ringing sounds and dizzy episodes. Recommend ENT evaluation as an outpatient to rule out any   otologic cause of her presentation. She could be discharged home   from a neurological standpoint. Please feel free to contact us with any further questions. Thank you for this consultation.         Karyle Ivory, MD AS / Tabitha.Tani   D:  06/22/2017   16:55   T:  06/22/2017   17:19   Job #:  [id number]

## 2017-06-22 NOTE — PROCEDURES
[facility]  *** FINAL REPORT ***    Name: Gio Wallis  MRN: [id number]    Outpatient  : 15 Mar 1955  HIS Order #: [id number]  [address] [facility] Visit #: [id number]  Date: 2017    TYPE OF TEST: Cerebrovascular Duplex    REASON FOR TEST  Family history /Risk    Right Carotid:-             Proximal               Mid                 Distal  cm/s  Systolic  Diastolic  Systolic  Diastolic  Systolic  Diastolic  CCA:     15.4      13.0                            55.0      12.0  Bulb:  ECA:     40.0       8.0  ICA:     38.0       8.0                            52.0      16.0  ICA/CCA:  0.7       0.7    ICA Stenosis:    Right Vertebral:-  Finding: Antegrade  Sys:       31.0  Madonna:       12.0    Right Subclavian:    Left Carotid:-            Proximal                Mid                 Distal  cm/s  Systolic  Diastolic  Systolic  Diastolic  Systolic  Diastolic  CCA:     97.7      14.0                            60.0      10.0  Bulb:  ECA:     40.0      11.0  ICA:     57.0      17.0                            42.0      11.0  ICA/CCA:  1.0       1.7    ICA Stenosis:    Left Vertebral:-  Finding: Antegrade  Sys:       40.0  Madonna:        6.0    Left Subclavian:    INTERPRETATION/FINDINGS  PROCEDURE:  Color duplex ultrasound imaging of extracranial  cerebrovascular arteries. FINDINGS:       Right:  Internal carotid velocity is within normal limits. There   is narrowing of the internal carotid flow channel on color Doppler  imaging and non-calcific plaque on B-mode imaging, consistent with  less than 50 percent stenosis (lower portion of the 0 to 49 percent  range). The common and external carotid arteries are patent and  without evidence of hemodynamically significant stenosis. Left:    Internal carotid velocity is within normal limits.   There is narrowing of the internal carotid flow channel on color  Doppler imaging and non-calcific plaque on B-mode imaging, consistent  with less than 50 percent stenosis (lower portion of the 0 to 49  percent range). The common and external carotid arteries are patent  and without evidence of hemodynamically significant stenosis. IMPRESSION:  Consistent with less than 50% stenosis of the right  internal carotid and less than 50% stenosis of the left internal  carotid. Vertebrals are patent with antegrade flow. ADDITIONAL COMMENTS    I have personally reviewed the data relevant to the interpretation of  this  study. TECHNOLOGIST: Dorys Fiore.  Viky Maurice  Signed: 06/22/2017 04:21 PM    PHYSICIAN: Hanh Dodson MD  Signed: 06/23/2017 07:08 AM

## 2017-06-22 NOTE — IP AVS SNAPSHOT
Current Discharge Medication List  
  
CONTINUE these medications which have CHANGED Dose & Instructions Dispensing Information Comments Morning Noon Evening Bedtime  
 metFORMIN 500 mg tablet Commonly known as:  GLUCOPHAGE What changed:  additional instructions Your last dose was: Your next dose is: TAKE 1 TABLET BY MOUTH TWO (2) TIMES DAILY (WITH MEALS). HOLD UNTIL Saturday 6/24 Quantity:  180 Tab Refills:  1 CONTINUE these medications which have NOT CHANGED Dose & Instructions Dispensing Information Comments Morning Noon Evening Bedtime  
 amLODIPine 10 mg tablet Commonly known as:  Opal Heymann Your last dose was: Your next dose is: TAKE 1 TABLET BY MOUTH EVERY DAY Quantity:  30 Tab Refills:  2  
     
   
   
   
  
 aspirin delayed-release 81 mg tablet Your last dose was: Your next dose is: Take  by mouth daily. Refills:  0  
     
   
   
   
  
 glipiZIDE 5 mg tablet Commonly known as:  Zac Culp Your last dose was: Your next dose is: TAKE 1 TABLET BY MOUTH BEFORE BREAKFAST AND DINNER. Quantity:  60 Tab Refills:  3 KLOR-CON M20 20 mEq tablet Generic drug:  potassium chloride Your last dose was: Your next dose is: TAKE 1 TABLET BY MOUTH TWICE A DAY Quantity:  60 Tab Refills:  3  
     
   
   
   
  
 losartan-hydroCHLOROthiazide 100-12.5 mg per tablet Commonly known as:  HYZAAR Your last dose was: Your next dose is: TAKE ONE TABLET BY MOUTH DAILY Quantity:  30 Tab Refills:  5  
     
   
   
   
  
 pravastatin 10 mg tablet Commonly known as:  PRAVACHOL Your last dose was: Your next dose is:    
   
   
 Dose:  10 mg Take 1 Tab by mouth nightly. Quantity:  90 Tab Refills:  1 Where to Get Your Medications These medications were sent to [address] [place] [address], [address] [place] [address] [address]  [address], [address] [facility] [address] Phone:  [phone]  
  metFORMIN 500 mg tablet

## 2017-06-22 NOTE — PROGRESS NOTES
Consult received and appreciated. Reviewed chart and discussed case with nsg and patient bedside. Patient drinking soda while talking with SLP and demonstrated no difficulty. She reports no concerns regarding speech, language, or swallowing function. Relays her symptoms to SLP, reporting abnormal gait, that \"I couldn't even pass a drug test with the way I'm walking! I hope they figure out what's going on! \" patient aware of hospital course, reporting tests she has already had and tests she is scheduled for. Reports her speech is her normal accent (from [place] in the 70's.)  Formal SLP evaluation is not indicated at this time. Please re-consult if further needs arise. Thanks. Melani Chavarria M.CD.  CCC-SLP

## 2017-06-22 NOTE — ED PROVIDER NOTES
HPI Comments: 57 y.o. female with past medical history significant for DM, HTN and hypothyroidism who presents ambulatory from home with chief complaint of unsteady gait. Pt reports 1-week history of unsteady gait and right arm numbness. Pt describes gait as \"walking like (she's) drunk, leaning to the right side\". Pt states right arm numbness radiates through her right hand, causing it to feel like she is \"playing with sand\". Pt was not evaluated prior to visit to the ED yesterday because she thought her symptoms would resolve on their own. Pt refused admission yesterday because she is the primary caregiver of her disabled . However, pt returns today for admission after making arrangements for her . Pt denies previous cardiac history. Pt is compliant with medications for HTN and DM, in addition to daily asa. Pt denies taking blood thinners. Pt specifically denies slurred speech, change in speech, weakness, and visual changes. There are no other acute medical concerns at this time. Old Chart Review:  Pt was evaluated in the ED yesterday with the same symptoms. Pt had a full work-up, including CT head yesterday that showed no acute abnormality. Small vessel ischemic changes, focal chronic ischemia left frontal deep white matter, and right thalamic lacunar infarct. Physician recommended admission for further work-up, but pt declined and left AMA because she had to take care of her . Social hx: denies tobacco use; denies EtOH use; denies illicit drug use  PCP: Rose Drew MD    Note written by Juliette Clinton, as dictated by Lieutenant Win MD 11:16 AM        The history is provided by the patient and medical records.         Past Medical History:   Diagnosis Date    Diabetes ([address]     Hypertension     Echo 6-25-15- borderline LVH, normal EF- CAV     Hypothyroid        Past Surgical History:   Procedure Laterality Date    HX  SECTION          HX CHOLECYSTECTOMY  HX COLONOSCOPY      HX PARATHYROIDECTOMY  2008    had 2 glands removed    HX THYROIDECTOMY  2008         Family History:   Problem Relation Age of Onset    Hypertension Mother     Hypertension Brother     Hypertension Maternal Aunt     No Known Problems Father        Social History     Social History    Marital status:      Spouse name: N/A    Number of children: N/A    Years of education: N/A     Occupational History    Not on file. Social History Main Topics    Smoking status: Never Smoker    Smokeless tobacco: Never Used    Alcohol use No    Drug use: No    Sexual activity: Not on file     Other Topics Concern    Not on file     Social History Narrative         ALLERGIES: Review of patient's allergies indicates no known allergies. Review of Systems   Constitutional: Negative for activity change, diaphoresis, fatigue and fever. HENT: Negative for congestion and sore throat. Eyes: Negative for photophobia and visual disturbance. Respiratory: Negative for chest tightness and shortness of breath. Cardiovascular: Negative for chest pain, palpitations and leg swelling. Gastrointestinal: Negative for abdominal pain, blood in stool, constipation, diarrhea, nausea and vomiting. Genitourinary: Negative for difficulty urinating, dysuria, flank pain, frequency and hematuria. Musculoskeletal: Positive for gait problem. Negative for back pain. Neurological: Positive for numbness. Negative for dizziness, syncope, speech difficulty, weakness and headaches. All other systems reviewed and are negative. Vitals:    06/22/17 1055   BP: (!) 167/94   Pulse: 65   Resp: 16   Temp: 98.3 °F (36.8 °C)   SpO2: 97%   Weight: 73.6 kg (162 lb 4.8 oz)   Height: 5' 3.5\" (1.613 m)            Physical Exam   Constitutional: She is oriented to person, place, and time. She appears well-developed and well-nourished. No distress. HENT:   Head: Normocephalic and atraumatic.    Nose: Nose normal.   Mouth/Throat: Oropharynx is clear and moist. No oropharyngeal exudate. Eyes: Conjunctivae and EOM are normal. Right eye exhibits no discharge. Left eye exhibits no discharge. No scleral icterus. Neck: Normal range of motion. Neck supple. No JVD present. No tracheal deviation present. No thyromegaly present. Cardiovascular: Normal rate, regular rhythm, normal heart sounds and intact distal pulses. Exam reveals no gallop and no friction rub. No murmur heard. Pulmonary/Chest: Effort normal and breath sounds normal. No stridor. No respiratory distress. She has no wheezes. She has no rales. She exhibits no tenderness. Abdominal: Bowel sounds are normal. She exhibits no distension and no mass. There is no tenderness. There is no rebound. Musculoskeletal: Normal range of motion. She exhibits no edema or tenderness. Lymphadenopathy:     She has no cervical adenopathy. Neurological: She is alert and oriented to person, place, and time. No cranial nerve deficit. Coordination normal.   Skin: Skin is warm and dry. No rash noted. She is not diaphoretic. No erythema. No pallor. Psychiatric: She has a normal mood and affect. Her behavior is normal. Judgment and thought content normal.   Note written by Juliette Tompkins, as dictated by Radha Dobbs MD 11:16 AM     MDM  Number of Diagnoses or Management Options  Cerebrovascular accident (CVA), unspecified mechanism [facility]):      Amount and/or Complexity of Data Reviewed  Clinical lab tests: ordered and reviewed  Tests in the radiology section of CPT®: ordered and reviewed    Risk of Complications, Morbidity, and/or Mortality  Presenting problems: moderate  Diagnostic procedures: moderate  Management options: moderate    Critical Care  Total time providing critical care: 30-74 minutes (Total critical care time spend exclusive of procedures:  35 min.  )    ED Course       Procedures    ED EKG interpretation:  Rhythm: normal sinus rhythm.  Rate (approx.): 61; Axis: normal; ST/T wave: T-wave inversion in V3-V6. Note written by Juliette Guillaume, as dictated by Asuncion Kimbrough MD 11:16 AM    CONSULT NOTE:  11:45 Jin Bergeron MD spoke with Dr. Kareem Ying, Consult for Hospitalist.  Discussed available diagnostic tests and clinical findings. He is in agreement with care plans as outlined. Dr. Kareem Ying will evaluate and admit the patient. 4:16 PM  Patient is being admitted to the hospital.  The results of their tests and reasons for their admission have been discussed with them and/or available family. They convey agreement and understanding for the need to be admitted and for their admission diagnosis. Consultation has been made with the inpatient physician specialist for hospitalization. LABORATORY TESTS:  No results found for this or any previous visit (from the past 12 hour(s)). IMAGING RESULTS:  DUPLEX CAROTID BILATERAL   Final Result      MRI BRAIN WO CONT   Final Result        No results found. MEDICATIONS GIVEN:  Medications   potassium chloride SR (KLOR-CON 10) tablet 40 mEq (40 mEq Oral Given 6/22/17 1222)   sodium chloride (NS) flush 20 mL (20 mL IntraVENous Given 6/22/17 1344)       IMPRESSION:  1. Cerebrovascular accident (CVA), unspecified mechanism ([address]    2. Gait abnormality    3. Numbness    4. Dizziness    5. Type 2 diabetes mellitus without complication, without long-term current use of insulin ([address]        PLAN:  1.  Admit to Hospitialist    Total critical care time spent exclusive of procedures:  35 minutes      Asuncion Kimbrough MD

## 2017-06-22 NOTE — PROGRESS NOTES
Admission Medication Reconciliation:    Information obtained from: Patient, Med Rec performed yesterday     Significant PMH/Disease States:   Past Medical History:   Diagnosis Date    Diabetes ([address]     Hypertension     Echo 6-25-15- borderline LVH, normal EF- CAV     Hypothyroid        Chief Complaint for this Admission:  Numbness, tingling    Allergies:  Review of patient's allergies indicates no known allergies. Prior to Admission Medications:   Prior to Admission Medications   Prescriptions Last Dose Informant Patient Reported? Taking? KLOR-CON M20 20 mEq tablet 6/22/2017 at 0800  No Yes   Sig: TAKE 1 TABLET BY MOUTH TWICE A DAY   amLODIPine (NORVASC) 10 mg tablet 6/22/2017 at 0800  No Yes   Sig: TAKE 1 TABLET BY MOUTH EVERY DAY   aspirin delayed-release 81 mg tablet 6/22/2017 at 0800  Yes Yes   Sig: Take  by mouth daily. glipiZIDE (GLUCOTROL) 5 mg tablet 6/22/2017 at 0800  No Yes   Sig: TAKE 1 TABLET BY MOUTH BEFORE BREAKFAST AND DINNER. losartan-hydroCHLOROthiazide (HYZAAR) 100-12.5 mg per tablet 6/22/2017 at 0800  No Yes   Sig: TAKE ONE TABLET BY MOUTH DAILY   metFORMIN (GLUCOPHAGE) 500 mg tablet 6/22/2017 at 0800  No Yes   Sig: TAKE 1 TABLET BY MOUTH TWO (2) TIMES DAILY (WITH MEALS). pravastatin (PRAVACHOL) 10 mg tablet 6/21/2017 at 2000  No Yes   Sig: Take 1 Tab by mouth nightly. Facility-Administered Medications: None         Comments/Recommendations:   No changes except to update time of last doses between the time she left yesterday and returned today. Thank you.

## 2017-06-22 NOTE — PROGRESS NOTES
Patient states Dr. Amos Etienne told her she would be discharged this afternoon. I spoke with Dr. Mao Swenson and he states patient must be seen by PT/OT before discharge. I will update patient.

## 2017-06-22 NOTE — IP AVS SNAPSHOT
[address] 
[phone] Patient: Debra Fishman MRN: [id number] HRP:1/84/1822 You are allergic to the following No active allergies Recent Documentation Height Weight BMI OB Status Smoking Status 1.613 m 73.6 kg 28.3 kg/m2 Postmenopausal Never Smoker Emergency Contacts Name Discharge Info Relation Home Work Mobile Tiara(Brother)Joe DISCHARGE CAREGIVER [3] Brother [24] [phone] About your hospitalization You were admitted on:  June 22, 2017 You last received care in the:  [facility] 5 OBSERVATION You were discharged on:  June 23, 2017 Unit phone number:  [phone] Why you were hospitalized Your primary diagnosis was:  Gait Abnormality Your diagnoses also included:  Weakness Due To Cerebrovascular Accident (Cva) (Formerly [facility]) Providers Seen During Your Hospitalizations Provider Role Specialty Primary office phone Melba Cabot, MD Attending Provider Emergency Medicine [phone] Kenan Larios MD Attending Provider [facility] [phone] Lala Rodriguez MD Attending Provider Internal Medicine [phone] Your Primary Care Physician (PCP) Primary Care Physician Office Phone Office Fax Julia Dacia [phone] Follow-up Information Follow up With Details Comments Contact Info Dana Oconnor MD In 1 week Hospital follow up [address] [facility] 
[phone] Cline Koyanagi, MD  Follow up with ENT in 1-2 weeks [address] [place] [address] [address] [place] [address] 
[phone] Current Discharge Medication List  
  
CONTINUE these medications which have CHANGED Dose & Instructions Dispensing Information Comments Morning Noon Evening Bedtime  
 metFORMIN 500 mg tablet Commonly known as:  GLUCOPHAGE What changed:  additional instructions Your last dose was: Your next dose is: TAKE 1 TABLET BY MOUTH TWO (2) TIMES DAILY (WITH MEALS). HOLD UNTIL Saturday 6/24 Quantity:  180 Tab Refills:  1 CONTINUE these medications which have NOT CHANGED Dose & Instructions Dispensing Information Comments Morning Noon Evening Bedtime  
 amLODIPine 10 mg tablet Commonly known as:  Brittany Ventura Your last dose was: Your next dose is: TAKE 1 TABLET BY MOUTH EVERY DAY Quantity:  30 Tab Refills:  2  
     
   
   
   
  
 aspirin delayed-release 81 mg tablet Your last dose was: Your next dose is: Take  by mouth daily. Refills:  0  
     
   
   
   
  
 glipiZIDE 5 mg tablet Commonly known as:  Supa Adhikari Your last dose was: Your next dose is: TAKE 1 TABLET BY MOUTH BEFORE BREAKFAST AND DINNER. Quantity:  60 Tab Refills:  3 KLOR-CON M20 20 mEq tablet Generic drug:  potassium chloride Your last dose was: Your next dose is: TAKE 1 TABLET BY MOUTH TWICE A DAY Quantity:  60 Tab Refills:  3  
     
   
   
   
  
 losartan-hydroCHLOROthiazide 100-12.5 mg per tablet Commonly known as:  HYZAAR Your last dose was: Your next dose is: TAKE ONE TABLET BY MOUTH DAILY Quantity:  30 Tab Refills:  5  
     
   
   
   
  
 pravastatin 10 mg tablet Commonly known as:  PRAVACHOL Your last dose was: Your next dose is:    
   
   
 Dose:  10 mg Take 1 Tab by mouth nightly. Quantity:  90 Tab Refills:  1 Where to Get Your Medications These medications were sent to [address] [place], [place] [address]y 1  800 [facility], [address] [place] [address] Phone:  [phone]  
  metFORMIN 500 mg tablet Discharge Instructions Discharge Instructions PATIENT ID: Óscar Abreu MRN: [id number] YOB: 1955 DATE OF ADMISSION: 6/22/2017 11:00 AM   
DATE OF DISCHARGE: 6/23/2017 PRIMARY CARE PROVIDER: Blair Franklin MD  
 
ATTENDING PHYSICIAN: Sharlene Bush MD 
DISCHARGING PROVIDER: Magdalene Rosenbaum NP To contact this individual call [phone] and ask the  to page. If unavailable ask to be transferred the Adult Hospitalist Department. DISCHARGE DIAGNOSES Abnormal Gait, Right Sided Numbness CONSULTATIONS: IP CONSULT TO HOSPITALIST 
IP CONSULT TO NEUROLOGY PROCEDURES/SURGERIES: * No surgery found * PENDING TEST RESULTS:  
At the time of discharge the following test results are still pending: none FOLLOW UP APPOINTMENTS:  
Follow-up Information Follow up With Details Comments Contact Info Blair Franklin MD In 1 week Hospital follow up [address] [place] [address] 
[phone] Eligio Floyd MD  Follow up with ENT in 1-2 weeks [address] [place] [address] [address] [place] [address] 
[phone] ADDITIONAL CARE RECOMMENDATIONS:  
1. Recommending you follow up with an Ear, Nose, and Throat Specialist in 1-2 weeks for an outpatient evaluation as your symptoms maybe related to an inner ear issue. 2. Follow up with your primary care provider within 1 week. 3. Hold you Metformin until tomorrow 6/24 due to receiving contrast.  
  
DIET: Diabetic 
  
ACTIVITY: as tolerated 
  
WOUND CARE: none 
  
EQUIPMENT needed: none DISCHARGE MEDICATIONS: 
 See Medication Reconciliation Form · It is important that you take the medication exactly as they are prescribed. · Keep your medication in the bottles provided by the pharmacist and keep a list of the medication names, dosages, and times to be taken in your wallet. · Do not take other medications without consulting your doctor.   
 
 
NOTIFY YOUR PHYSICIAN FOR ANY OF THE FOLLOWING:  
 Fever over 101 degrees for 24 hours. Chest pain, shortness of breath, fever, chills, nausea, vomiting, diarrhea, change in mentation, falling, weakness, bleeding. Severe pain or pain not relieved by medications. Or, any other signs or symptoms that you may have questions about. DISPOSITION: 
X  Home With: 
 OT  PT  [facility]  RN  
  
 SNF/Inpatient Rehab/LTAC Independent/assisted living Hospice Other: CDMP Checked: Yes X PROBLEM LIST Updated: Yes X Signed:  
Anna Kraus NP 
6/23/2017 
8:13 AM 
 
Discharge Orders None [employer] Announcement We are excited to announce that we are making your provider's discharge notes available to you in [employer]. You will see these notes when they are completed and signed by the physician that discharged you from your recent hospital stay. If you have any questions or concerns about any information you see in [employer], please call the Health Information Department where you were seen or reach out to your Primary Care Provider for more information about your plan of care. Introducing \[facility]\"" & HEALTH SERVICES! Bernie Bessverly introduces [employer] patient portal. Now you can access parts of your medical record, email your doctor's office, and request medication refills online. 1. In your internet browser, go to [email or web address] 2. Click on the First Time User? Click Here link in the Sign In box. You will see the New Member Sign Up page. 3. Enter your [employer] Access Code exactly as it appears below. You will not need to use this code after youve completed the sign-up process. If you do not sign up before the expiration date, you must request a new code. · [employer] Access Code: [email or web address] Expires: 6/27/2017 11:17 AM 
 
4. Enter the last four digits of your Social Security Number (xxxx) and Date of Birth (mm/dd/yyyy) as indicated and click Submit. You will be taken to the next sign-up page. 5. Create a [employer] / [employer] ID. This will be your [employer] login ID and cannot be changed, so think of one that is secure and easy to remember. 6. Create a [employer] password. You can change your password at any time. 7. Enter your Password Reset Question and Answer. This can be used at a later time if you forget your password. 8. Enter your e-mail address. You will receive e-mail notification when new information is available in [address] 9. Click Sign Up. You can now view and download portions of your medical record. 10. Click the Download Summary menu link to download a portable copy of your medical information. If you have questions, please visit the Frequently Asked Questions section of the [employer] website. Remember, [employer] is NOT to be used for urgent needs. For medical emergencies, dial 911. Now available from your iPhone and Android! General Information Please provide this summary of care documentation to your next provider. Patient Signature:  ____________________________________________________________ Date:  ____________________________________________________________  
  
Bert Ramirez Provider Signature:  ____________________________________________________________ Date:  ____________________________________________________________

## 2017-06-22 NOTE — CONSULTS
Consult dictated. 2 week hx of dizziness/unsteady gait. Yesterday developed numbness on right side of head and arm that comes and goes. Work up negative. Continue ASA+statin. Recommend ENT eval as OP as she c/o decreased hearing on right, tinnitus and dizziness.   Katie Martinez MD

## 2017-06-22 NOTE — ROUTINE PROCESS
TRANSFER - OUT REPORT:    Verbal report given to [employer] (name) on Missy Pena  being transferred to Obs (unit) for routine progression of care       Report consisted of patients Situation, Background, Assessment and   Recommendations(SBAR). Information from the following report(s) SBAR and ED Summary was reviewed with the receiving nurse. Lines:   Peripheral IV 06/22/17 Left Antecubital (Active)   Site Assessment Clean, dry, & intact 6/22/2017 11:37 AM   Phlebitis Assessment 0 6/22/2017 11:37 AM   Infiltration Assessment 0 6/22/2017 11:37 AM   Dressing Status Clean, dry, & intact 6/22/2017 11:37 AM        Opportunity for questions and clarification was provided.       Patient transported with:   Monitor

## 2017-06-22 NOTE — ED TRIAGE NOTES
Pt seen yesterday and was supposed to be admitted for CVA but left AMA because she had to take care of her elderly parents. She is here to today to be admitted.

## 2017-06-23 ENCOUNTER — PATIENT OUTREACH (OUTPATIENT)
Dept: FAMILY MEDICINE CLINIC | Age: 62
End: 2017-06-23

## 2017-06-23 VITALS
WEIGHT: 162.3 LBS | BODY MASS INDEX: 27.71 KG/M2 | DIASTOLIC BLOOD PRESSURE: 82 MMHG | SYSTOLIC BLOOD PRESSURE: 161 MMHG | RESPIRATION RATE: 18 BRPM | HEART RATE: 68 BPM | OXYGEN SATURATION: 98 % | TEMPERATURE: 98 F | HEIGHT: 64 IN

## 2017-06-23 LAB
CHOLEST SERPL-MCNC: 144 MG/DL
ERYTHROCYTE [DISTWIDTH] IN BLOOD BY AUTOMATED COUNT: 12.3 % (ref 11.5–14.5)
EST. AVERAGE GLUCOSE BLD GHB EST-MCNC: 143 MG/DL
GLUCOSE BLD STRIP.AUTO-MCNC: 116 MG/DL (ref 65–100)
HBA1C MFR BLD: 6.6 % (ref 4.2–6.3)
HCT VFR BLD AUTO: 36.9 % (ref 35–47)
HDLC SERPL-MCNC: 46 MG/DL
HDLC SERPL: 3.1 {RATIO} (ref 0–5)
HGB BLD-MCNC: 12.3 G/DL (ref 11.5–16)
INR PPP: 1.1 (ref 0.9–1.1)
LDLC SERPL CALC-MCNC: 70.4 MG/DL (ref 0–100)
LIPID PROFILE,FLP: NORMAL
MCH RBC QN AUTO: 29.7 PG (ref 26–34)
MCHC RBC AUTO-ENTMCNC: 33.3 G/DL (ref 30–36.5)
MCV RBC AUTO: 89.1 FL (ref 80–99)
PHOSPHATE SERPL-MCNC: 3 MG/DL (ref 2.6–4.7)
PLATELET # BLD AUTO: 214 K/UL (ref 150–400)
PROTHROMBIN TIME: 11.2 SEC (ref 9–11.1)
RBC # BLD AUTO: 4.14 M/UL (ref 3.8–5.2)
SERVICE CMNT-IMP: ABNORMAL
TRIGL SERPL-MCNC: 138 MG/DL (ref ?–150)
VLDLC SERPL CALC-MCNC: 27.6 MG/DL
WBC # BLD AUTO: 6.8 K/UL (ref 3.6–11)

## 2017-06-23 PROCEDURE — 82962 GLUCOSE BLOOD TEST: CPT

## 2017-06-23 PROCEDURE — G8979 MOBILITY GOAL STATUS: HCPCS | Performed by: PHYSICAL THERAPIST

## 2017-06-23 PROCEDURE — 36415 COLL VENOUS BLD VENIPUNCTURE: CPT | Performed by: FAMILY MEDICINE

## 2017-06-23 PROCEDURE — 84100 ASSAY OF PHOSPHORUS: CPT | Performed by: FAMILY MEDICINE

## 2017-06-23 PROCEDURE — G8980 MOBILITY D/C STATUS: HCPCS | Performed by: PHYSICAL THERAPIST

## 2017-06-23 PROCEDURE — 97165 OT EVAL LOW COMPLEX 30 MIN: CPT

## 2017-06-23 PROCEDURE — 97161 PT EVAL LOW COMPLEX 20 MIN: CPT | Performed by: PHYSICAL THERAPIST

## 2017-06-23 PROCEDURE — 80061 LIPID PANEL: CPT | Performed by: FAMILY MEDICINE

## 2017-06-23 PROCEDURE — G8988 SELF CARE GOAL STATUS: HCPCS

## 2017-06-23 PROCEDURE — 85610 PROTHROMBIN TIME: CPT | Performed by: FAMILY MEDICINE

## 2017-06-23 PROCEDURE — 99218 HC RM OBSERVATION: CPT

## 2017-06-23 PROCEDURE — 74011250637 HC RX REV CODE- 250/637: Performed by: FAMILY MEDICINE

## 2017-06-23 PROCEDURE — G8987 SELF CARE CURRENT STATUS: HCPCS

## 2017-06-23 PROCEDURE — G8978 MOBILITY CURRENT STATUS: HCPCS | Performed by: PHYSICAL THERAPIST

## 2017-06-23 PROCEDURE — 97116 GAIT TRAINING THERAPY: CPT | Performed by: PHYSICAL THERAPIST

## 2017-06-23 PROCEDURE — G8989 SELF CARE D/C STATUS: HCPCS

## 2017-06-23 PROCEDURE — 85027 COMPLETE CBC AUTOMATED: CPT | Performed by: FAMILY MEDICINE

## 2017-06-23 PROCEDURE — 83036 HEMOGLOBIN GLYCOSYLATED A1C: CPT | Performed by: FAMILY MEDICINE

## 2017-06-23 RX ORDER — METFORMIN HYDROCHLORIDE 500 MG/1
TABLET ORAL
Qty: 180 TAB | Refills: 1 | Status: SHIPPED | OUTPATIENT
Start: 2017-06-23

## 2017-06-23 RX ADMIN — POTASSIUM CHLORIDE 20 MEQ: 750 TABLET, FILM COATED, EXTENDED RELEASE ORAL at 08:08

## 2017-06-23 RX ADMIN — HYDROCHLOROTHIAZIDE: 25 TABLET ORAL at 08:08

## 2017-06-23 RX ADMIN — AMLODIPINE BESYLATE 10 MG: 5 TABLET ORAL at 08:08

## 2017-06-23 RX ADMIN — GLIPIZIDE 5 MG: 5 TABLET ORAL at 07:55

## 2017-06-23 RX ADMIN — ASPIRIN 81 MG: 81 TABLET, COATED ORAL at 08:08

## 2017-06-23 NOTE — DISCHARGE INSTRUCTIONS
Discharge Instructions       PATIENT ID: Nicola Dominique  MRN: [id number]   YOB: 1955    DATE OF ADMISSION: 6/22/2017 11:00 AM    DATE OF DISCHARGE: 6/23/2017    PRIMARY CARE PROVIDER: Mary Anne Goodman MD     ATTENDING PHYSICIAN: Josh Ferrara MD  DISCHARGING PROVIDER: Kleber Cheek NP    To contact this individual call [phone] and ask the  to page. If unavailable ask to be transferred the Adult Hospitalist Department. DISCHARGE DIAGNOSES Abnormal Gait, Right Sided Numbness    CONSULTATIONS: IP CONSULT TO HOSPITALIST  IP CONSULT TO NEUROLOGY    PROCEDURES/SURGERIES: * No surgery found *    PENDING TEST RESULTS:   At the time of discharge the following test results are still pending: none    FOLLOW UP APPOINTMENTS:   Follow-up Information     Follow up With Details Kassandra Kerr MD In 1 week Hospital follow up [address]      Sherren North, MD  Follow up with ENT in 1-2 weeks [address] [place] [address]  [phone]             ADDITIONAL CARE RECOMMENDATIONS:   1. Recommending you follow up with an Ear, Nose, and Throat Specialist in 1-2 weeks for an outpatient evaluation as your symptoms maybe related to an inner ear issue. 2. Follow up with your primary care provider within 1 week. 3. Hold you Metformin until tomorrow 6/24 due to receiving contrast.      DIET: Diabetic     ACTIVITY: as tolerated     WOUND CARE: none     EQUIPMENT needed: none    DISCHARGE MEDICATIONS:   See Medication Reconciliation Form    · It is important that you take the medication exactly as they are prescribed. · Keep your medication in the bottles provided by the pharmacist and keep a list of the medication names, dosages, and times to be taken in your wallet. · Do not take other medications without consulting your doctor.        NOTIFY YOUR PHYSICIAN FOR ANY OF THE FOLLOWING:   Fever over 101 degrees for 24 hours. Chest pain, shortness of breath, fever, chills, nausea, vomiting, diarrhea, change in mentation, falling, weakness, bleeding. Severe pain or pain not relieved by medications. Or, any other signs or symptoms that you may have questions about. DISPOSITION:  X  Home With:   OT  PT  HH  RN       SNF/Inpatient Rehab/LTAC    Independent/assisted living    Hospice    Other:     CDMP Checked: Yes X     PROBLEM LIST Updated:   Yes X       Signed:   Wilhemenia Soulier, NP  6/23/2017  8:13 AM

## 2017-06-23 NOTE — DISCHARGE SUMMARY
Discharge Summary       PATIENT ID: Ximena Vincent  MRN: [id number]   YOB: 1955    DATE OF ADMISSION: 6/22/2017 11:00 AM    DATE OF DISCHARGE: 6/23/2017  PRIMARY CARE PROVIDER: Maida Dash MD     ATTENDING PHYSICIAN: Berenice Vilchis  DISCHARGING PROVIDER: Roderick Diane NP    To contact this individual call [phone] and ask the  to page. If unavailable ask to be transferred the Adult Hospitalist Department. CONSULTATIONS: IP CONSULT TO HOSPITALIST  IP CONSULT TO NEUROLOGY    PROCEDURES/SURGERIES: * No surgery found *    ADMITTING DIAGNOSES & HOSPITAL COURSE:   Dx: Gait abnormality with right sided numbness, Hypokalemia    57 y.o. female with PMH of HTN, DM, and hypothyroidism,  who presented to the ED ambulatory today form home with gait abnormalities and weakness and numbness on her right side of the face and right arm. Patient said it has been going on for a week. Patient decided to come tot he ED yesterday but she could not stay at the hospital as she is taking care of her  at home. She is walking like a drunk person. No fall. Stroke work up unremarkable. Suspect sxs related inner ear issue. Patient stable for discharge. DISCHARGE DIAGNOSES / PLAN:      Abnormal gait with right sided numbness   -CT head No acute abnormality. Small vessel ischemic changes, focal chronic  ischemia left frontal deep white matter, and right thalamic lacunar infarct. -MRI brain 1. No evidence for acute infarction. 2. No evidence for intracranial hemorrhage. 3. Mild atrophy. Extensive chronic small vessel ischemic disease the white  matter. Chronic lacunar infarcts in thalami bilaterally and left external  capsule. 4. Left maxillary sinus mucocele.   -echo ef 55-60%   -carotids <50% stenosis bilaterally  -PT consulted- pt safe to return home does not need further PT at this time  -neurology consulted- seen by Dr Amos Etienne, w/u negative for stroke   -sxs >2 weeks not TIA   -possibly inner ear issue, recommending f/u with ENT OP   -continue home ASA, statin    Hypokalemia-POA  -k 3.2 on admission, replenish   -continue home potassium   -f/u with pcp     Type 2 Diabetes, controlled-POA   -hgb a1c 6.6  -continue home glipizide, hold metformin until tomorrow d/t contrast     Hyperlipidemia-POA  -chol 144, ldl 70 hdl 46  -continue home Pravastatin     Hypertension-POA  -slightly up on admission   -continue home meds   -follow up with pcp for further adjustments   Echo   SUMMARY:  Left ventricle: Systolic function was normal. Ejection fraction was  estimated in the range of 55 % to 60 %. There were no regional wall motion  abnormalities. There was mild concentric hypertrophy. Atrial septum: There was no evidence of intracardiac shunt by  peripherally-administered agitated saline contrast.    INDICATIONS: Stroke    PROCEDURE: This was a routine study. The study included complete 2D  imaging, M-mode, complete spectral Doppler, and color Doppler. Systolic  blood pressure was 154 mmHg, at the start of the study. Diastolic blood  pressure was 76 mmHg, at the start of the study. Intravenous contrast  (agitated saline) was administered to evaluate shunting. Image quality was  adequate. LEFT VENTRICLE: Size was normal. Systolic function was normal. Ejection  fraction was estimated in the range of 55 % to 60 %. There were no  regional wall motion abnormalities. There was mild concentric hypertrophy. RIGHT VENTRICLE: The size was normal. Systolic function was normal. Wall  thickness was normal.    LEFT ATRIUM: Size was normal.    ATRIAL SEPTUM: There was no evidence of intracardiac shunt by  peripherally-administered agitated saline contrast.    RIGHT ATRIUM: Size was normal.    MITRAL VALVE: Normal valve structure. There was normal leaflet separation. DOPPLER: The transmitral velocity was within the normal range. There was  no evidence for stenosis. There was no regurgitation.     AORTIC VALVE: The valve was trileaflet. Leaflets exhibited normal  thickness and normal cuspal separation. DOPPLER: Transaortic velocity was  within the normal range. There was no stenosis. There was no regurgitation. TRICUSPID VALVE: Normal valve structure. There was normal leaflet  separation. DOPPLER: The transtricuspid velocity was within the normal  range. There was no evidence for tricuspid stenosis. There was mild  regurgitation. Pulmonary artery systolic pressure: 35 mmHg. There was mild  pulmonary hypertension. PULMONIC VALVE: Leaflets exhibited normal thickness, no calcification, and  normal cuspal separation. DOPPLER: The transpulmonic velocity was within  the normal range. There was no regurgitation. AORTA: The root exhibited normal size. PERICARDIUM: There was no pericardial effusion. PENDING TEST RESULTS:   At the time of discharge the following test results are still pending: none    FOLLOW UP APPOINTMENTS:    Follow-up Information     Follow up With Details Comments Freddy Kerr MD In 1 week Hospital follow up [address] [address]      Gracie Landon MD  Follow up with ENT in 1-2 weeks [address] [place] [address]  [phone]             ADDITIONAL CARE RECOMMENDATIONS:   1. Recommending you follow up with an Ear, Nose, and Throat Specialist in 1-2 weeks for an outpatient evaluation as your symptoms maybe related to an inner ear issue. 2. Follow up with your primary care provider within 1 week. 3. Hold you Metformin until tomorrow 6/24 due to receiving contrast.     DIET: Diabetic    ACTIVITY: as tolerated    WOUND CARE: none    EQUIPMENT needed: none      DISCHARGE MEDICATIONS:  Current Discharge Medication List      CONTINUE these medications which have CHANGED    Details   metFORMIN (GLUCOPHAGE) 500 mg tablet TAKE 1 TABLET BY MOUTH TWO (2) TIMES DAILY (WITH MEALS).  HOLD UNTIL Saturday 6/24  Qty: 180 Tab, Refills: 1 Associated Diagnoses: Type 2 diabetes mellitus without complication, without long-term current use of insulin (HCC)         CONTINUE these medications which have NOT CHANGED    Details   glipiZIDE (GLUCOTROL) 5 mg tablet TAKE 1 TABLET BY MOUTH BEFORE BREAKFAST AND DINNER. Qty: 60 Tab, Refills: 3      losartan-hydroCHLOROthiazide (HYZAAR) 100-12.5 mg per tablet TAKE ONE TABLET BY MOUTH DAILY  Qty: 30 Tab, Refills: 5      amLODIPine (NORVASC) 10 mg tablet TAKE 1 TABLET BY MOUTH EVERY DAY  Qty: 30 Tab, Refills: 2      pravastatin (PRAVACHOL) 10 mg tablet Take 1 Tab by mouth nightly. Qty: 90 Tab, Refills: 1    Associated Diagnoses: Hyperlipidemia with target LDL less than 100      KLOR-CON M20 20 mEq tablet TAKE 1 TABLET BY MOUTH TWICE A DAY  Qty: 60 Tab, Refills: 3    Associated Diagnoses: Hypokalemia      aspirin delayed-release 81 mg tablet Take  by mouth daily. NOTIFY YOUR PHYSICIAN FOR ANY OF THE FOLLOWING:   Fever over 101 degrees for 24 hours. Chest pain, shortness of breath, fever, chills, nausea, vomiting, diarrhea, change in mentation, falling, weakness, bleeding. Severe pain or pain not relieved by medications. Or, any other signs or symptoms that you may have questions about. DISPOSITION:   X Home With:   OT  PT  HH  RN       Long term SNF/Inpatient Rehab    Independent/assisted living    Hospice    Other:       PATIENT CONDITION AT DISCHARGE:     Functional status    Poor     Deconditioned    X Independent      Cognition   X  Lucid     Forgetful     Dementia      Catheters/lines (plus indication)    [place]     PICC     PEG    x None      Code status    X Full code     DNR      PHYSICAL EXAMINATION AT DISCHARGE:  General: No acute distress, cooperative, pleasant   EENT: EOMI. Anicteric sclerae. Oral mucous moist, oropharynx benign. Glasses  Resp: CTA bilaterally. No wheezing/rhonchi/rales.  No accessory muscle use  CV: Regular rhythm, normal rate, no murmurs, gallops, rubs  GI: Soft, non distended, non tender. normoactive bowel sounds,   Extremities: No edema, warm, 2+ pulses throughout  Neurologic: Moves all extremities. AAOx3, CN II-XII grossly intact  Psych: Good insight. Not anxious nor agitated.   Skin: Good Turgor, no rashes or ulcers      CHRONIC MEDICAL DIAGNOSES:  Problem List as of 6/23/2017  Date Reviewed: 3/29/2017          Codes Class Noted - Resolved    Weakness due to cerebrovascular accident (CVA) ([facility] [address] ICD-10-CM: I63.9, R53.1  ICD-9-CM: 434.91, 780.79  6/22/2017 - Present        * (Principal)Gait abnormality ICD-10-CM: R26.9  ICD-9-CM: 781.2  6/22/2017 - Present        Dysuria ICD-10-CM: R30.0  ICD-9-CM: 788.1  11/28/2016 - Present        Osteoarthritis of left patellofemoral joint ICD-10-CM: M17.12  ICD-9-CM: 715.36  11/7/2016 - Present        Primary osteoarthritis of first carpometacarpal joint of left hand ICD-10-CM: M18.12  ICD-9-CM: 715.14  11/7/2016 - Present        Hyperlipidemia with target LDL less than 100 ICD-10-CM: E78.5  ICD-9-CM: 272.4  8/26/2015 - Present        Well woman exam with routine gynecological exam ICD-10-CM: Y54.252  ICD-9-CM: V72.31  5/27/2015 - Present        Abnormal finding on EKG ICD-10-CM: R94.31  ICD-9-CM: 794.31  5/27/2015 - Present        Colon cancer screening ICD-10-CM: Z12.11  ICD-9-CM: V76.51  5/27/2015 - Present        Hypothyroidism, postsurgical ICD-10-CM: E89.0  ICD-9-CM: 244.0  5/27/2015 - Present        Candidiasis, intertrigo ICD-10-CM: B37.2  ICD-9-CM: 112.3  11/26/2014 - Present        Hypokalemia ICD-10-CM: E87.6  ICD-9-CM: 276.8  10/29/2014 - Present        CKD (chronic kidney disease) stage 3, GFR 30-59 ml/min ICD-10-CM: N18.3  ICD-9-CM: 585.3  10/29/2014 - Present        HTN (hypertension) ICD-10-CM: I10  ICD-9-CM: 401.9  10/28/2014 - Present        Hypothyroid ICD-10-CM: E03.9  ICD-9-CM: 244.9  10/28/2014 - Present        Physical exam, routine ICD-10-CM: Z00.00  ICD-9-CM: V70.0  10/28/2014 - Present        Diabetes mellitus type 2, uncomplicated ([facility] PMN-30-EG: E11.9  ICD-9-CM: 250.00  10/28/2014 - Present        Otitis media, chronic, suppurative ICD-10-CM: B87.1F4  ICD-9-CM: 382.3  10/28/2014 - Present        RESOLVED: Malignant hypertension ICD-10-CM: I10  ICD-9-CM: 401.0  6/3/2015 - 7/12/2015              Greater than 30 minutes were spent with the patient on counseling and coordination of care    Signed:   Mukesh Teran NP  6/23/2017  8:12 AM

## 2017-06-23 NOTE — PROGRESS NOTES
NN MIKE Note:    NN received message from provider to schedule patient for a follow-up visit. Patient was scheduled for a follow up on 6/27/17 at 1:00 PM. NN called number listed for patient at her home. NN was told that the patient was still in the hospital and was not available. NN left message with caretaker for the spouse. NN contact information left for questions and concerns. NN will call back for discharge assessment after patient's discharge.

## 2017-06-23 NOTE — ROUTINE PROCESS
Discharge instructions discussed with the patient. All questions answered. No new prescriptions written/given to the patient. Patient taken to patient discharge via wheelchair.

## 2017-06-23 NOTE — PROGRESS NOTES
physical Therapy EVALUATION/DISCHARGE  Patient: Sheila Garcia (32 y.o. female)  Date: 2017  Primary Diagnosis: Gait abnormality        Precautions:   Fall  ASSESSMENT :  Based on the objective data described below, the patient presents with near baseline functional mobility. Prior to admit patient was living in a 1 level home with no steps to enter. She is primary caregiver for her disabled  but does have caregivers daily for 5 hrs/day for him. Patient was previously independent with functional mobility. Patient currently modified independent for transfers and bed mobility. Amb approx 150 feet without assistive device with steady, stable gait. Has mild decreased step clearance but this is likely baseline for her. Patient is cleared for DC from a mobility standpoint without any need to [address] [place] [address] a follow up with ENT if continues with vestibular type symptoms. Skilled physical therapy is not indicated at this time. PLAN :  Discharge Recommendations: None  Further Equipment Recommendations for Discharge: TBD     SUBJECTIVE:   Patient stated I need to get home to my .     OBJECTIVE DATA SUMMARY:   HISTORY:    Past Medical History:   Diagnosis Date    Diabetes ([facility] [address]     Hypertension     Echo 6-25-15- borderline LVH, normal EF- CAV     Hypothyroid      Past Surgical History:   Procedure Laterality Date    HX  SECTION          HX CHOLECYSTECTOMY      HX COLONOSCOPY      HX PARATHYROIDECTOMY      had 2 glands removed    HX THYROIDECTOMY       Prior Level of Function/Home Situation: Independent with functional mobility.   Cares for disabled  and has paid caregivers daily for 5 hrs per day  Personal factors and/or comorbidities impacting plan of care:     Home Situation  Home Environment: Private residence  # Steps to Enter: 0  One/Two Story Residence: One story  Living Alone: No  Support Systems: Spouse/Significant Other/Partner  Patient Expects to be Discharged to[de-identified] Private residence  Current DME Used/Available at Home: None (has equipment but belongs to disabled )  Tub or Shower Type: Tub/Shower combination    EXAMINATION/PRESENTATION/DECISION MAKING:   Critical Behavior:  Neurologic State: Alert  Orientation Level: Appropriate for age  Cognition: Appropriate decision making, Appropriate for age attention/concentration, Appropriate safety awareness, Follows commands     Hearing: Auditory  Auditory Impairment: None    Range Of Motion:  AROM: Generally decreased, functional                       Strength:    Strength: Generally decreased, functional     Functional Mobility:  Bed Mobility:     Supine to Sit: Modified independent  Sit to Supine: Modified independent  Scooting: Modified independent  Transfers:  Sit to Stand: Modified independent  Stand to Sit: Modified independent                       Balance:   Sitting: Intact  Standing: Intact  Ambulation/Gait Training:  Distance (ft): 150 Feet (ft)  Assistive Device: Gait belt  Ambulation - Level of Assistance: Stand-by asssistance        Gait Abnormalities: Decreased step clearance        Base of Support: Widened     Speed/Leda: Pace decreased (<100 feet/min); Slow  Step Length: Left shortened;Right shortened         Functional Measure:  Veras Balance Test:    Sitting to Standin  Standing Unsupported: 4  Sitting with Back Unsupported: 4  Standing to Sittin  Transfers: 4  Standing Unsupported with Eyes Closed: 4  Standing Unsupported with Feet Together: 4  Reach Forward with Outstretched Arm: 4   Object: 4  Turn to Look Over Shoulders: 4  Turn 360 Degrees: 2  Alternate Foot on Step/Stool: 3  Standing Unsupported One Foot in Front: 3  Stand on One Le  Total: 50         56=Maximum possible score;   0-20=High fall risk  21-40=Moderate fall risk   41-56=Low fall risk     Veras Balance Test and G-code impairment scale:  Percentage of Impairment CH    0%   CI    1-19% CJ    20-39% CK    40-59% CL    60-79% CM    80-99% CN     100%   Tran   Score 0-56 56 45-55 34-44 23-33 12-22 1-11 0           G codes: In compliance with CMSs Claims Based Outcome Reporting, the following G-code set was chosen for this patient based on their primary functional limitation being treated: The outcome measure chosen to determine the severity of the functional limitation was the TRAN with a score of 50/56 which was correlated with the impairment scale. ? Mobility - Walking and Moving Around:     - CURRENT STATUS: CI - 1%-19% impaired, limited or restricted    - GOAL STATUS: CH - 0% impaired, limited or restricted    - D/C STATUS:  CI - 1%-19% impaired, limited or restricted         Pain:Pain Scale 1: Numeric (0 - 10)  Pain Intensity 1: 0     Activity Tolerance:   VSS  Please refer to the flowsheet for vital signs taken during this treatment. After treatment:   []   Patient left in no apparent distress sitting up in chair  [x]   Patient left in no apparent distress in bed  [x]   Call bell left within reach  [x]   Nursing notified  []   Caregiver present  []   Bed alarm activated    COMMUNICATION/EDUCATION:   Communication/Collaboration:  [x]   Fall prevention education was provided and the patient/caregiver indicated understanding. []   Patient/family have participated as able and agree with findings and recommendations. []   Patient is unable to participate in plan of care at this time.   Findings and recommendations were discussed with: Physical Therapist, Occupational Therapist and Registered Nurse    Thank you for this referral.  Kimmie Stern, PT, DPT   Time Calculation: 16 mins

## 2017-06-23 NOTE — PROGRESS NOTES
Occupational Therapy EVALUATION/discharge  Patient: Chavez Bowers (77 y.o. female)  Date: 2017  Primary Diagnosis: Gait abnormality        Precautions:   Fall    ASSESSMENT:   Based on the objective data described below, the patient presents with overall baseline independence-modified independence for functional mobility and ADLs. Patient received sitting EOB following PT evaluation. Patient with intact mobility, cued for energy conservation and pacing during positional changes as she is experiencing some dizziness but states this has been occurring since she was 19years old. Patient with intact ability to complete ADLs without assist. Patient looking forward to returning home to continue providing assist to disabled . No further needs noted, will complete OT orders at this time. Further skilled acute occupational therapy is not indicated at this time. Discharge Recommendations: None  Further Equipment Recommendations for Discharge: No needs      SUBJECTIVE:   Patient stated I am waiting to get back to my , he keeps asking where I am.    OBJECTIVE DATA SUMMARY:   HISTORY:   Past Medical History:   Diagnosis Date    Diabetes ([facility] [address]     Hypertension     Echo 6-25-15- borderline LVH, normal EF- CAV     Hypothyroid      Past Surgical History:   Procedure Laterality Date    HX  SECTION          HX CHOLECYSTECTOMY      HX COLONOSCOPY      HX PARATHYROIDECTOMY      had 2 glands removed    HX THYROIDECTOMY         Prior Level of Function/Home Situation: Per patient report, lives at home with  ( is disabled, has daily PCA for 5 hours, wife assists otherwise, has all needed DME). Independent. Drives.  Home management, meal prep, etc.  Expanded or extensive additional review of patient history:     Home Situation  Home Environment: Private residence  # Steps to Enter: 0  One/Two Story Residence: One story  Living Alone: No  Support Systems: Spouse/Significant Other/Partner  Patient Expects to be Discharged to[de-identified] Private residence  Current DME Used/Available at Home: None (has equipment but belongs to disabled )  Tub or Shower Type: Tub/Shower combination  []  Right hand dominant   []  Left hand dominant    EXAMINATION OF PERFORMANCE DEFICITS:  Cognitive/Behavioral Status:  Neurologic State: Alert  Orientation Level: Oriented X4  Cognition: Appropriate decision making; Appropriate for age attention/concentration; Appropriate safety awareness; Follows commands  Perception: Appears intact  Perseveration: No perseveration noted  Safety/Judgement: Awareness of environment; Insight into deficits;Home safety;Good awareness of safety precautions    Skin: Appears intact    Edema: None noted in BUEs    Hearing: Auditory  Auditory Impairment: None    Vision/Perceptual:                           Acuity: Within Defined Limits;Able to read clock/calendar on wall without difficulty; Able to read employee name badge without difficulty; Able to read newspaper without difficulty    Corrective Lenses: Glasses    Range of Motion:  AROM: Within functional limits  PROM: Within functional limits       Strength:  Strength: Within functional limits     Coordination:  Coordination: Within functional limits  Fine Motor Skills-Upper: Left Intact; Right Intact    Gross Motor Skills-Upper: Left Intact; Right Intact    Tone & Sensation:  Tone: Normal  Sensation: Intact (Reporting some tingling but improving)       Balance:  Sitting: Intact  Standing: Intact    Functional Mobility and Transfers for ADLs:  Bed Mobility:  Supine to Sit: Modified independent  Sit to Supine: Modified independent  Scooting: Modified independent    Transfers:  Sit to Stand: Modified independent  Stand to Sit: Modified independent  Toilet Transfer : Modified independent (Inferred per observations)    ADL Assessment:  Feeding: Independent    Oral Facial Hygiene/Grooming: Independent    Bathing: Modified independent    Upper Body Dressing: Independent    Lower Body Dressing: Modified independent    Toileting: Independent                ADL Intervention and task modifications:    Lower Body Dressing Assistance  Socks: Independent  Leg Crossed Method Used: Yes  Position Performed: Seated edge of bed    Cognitive Retraining  Safety/Judgement: Awareness of environment; Insight into deficits;Home safety;Good awareness of safety precautions    Functional Measure:  Barthel Index:    Bathin  Bladder: 10  Bowels: 10  Groomin  Dressing: 10  Feeding: 10  Mobility: 15  Stairs: 0  Toilet Use: 10  Transfer (Bed to Chair and Back): 15  Total: 90       Barthel and G-code impairment scale:  Percentage of impairment CH  0% CI  1-19% CJ  20-39% CK  40-59% CL  60-79% CM  80-99% CN  100%   Barthel Score 0-100 100 99-80 79-60 59-40 20-39 1-19   0   Barthel Score 0-20 20 17-19 13-16 9-12 5-8 1-4 0      The Barthel ADL Index: Guidelines  1. The index should be used as a record of what a patient does, not as a record of what a patient could do. 2. The main aim is to establish degree of independence from any help, physical or verbal, however minor and for whatever reason. 3. The need for supervision renders the patient not independent. 4. A patient's performance should be established using the best available evidence. Asking the patient, friends/relatives and nurses are the usual sources, but direct observation and common sense are also important. However direct testing is not needed. 5. Usually the patient's performance over the preceding 24-48 hours is important, but occasionally longer periods will be relevant. 6. Middle categories imply that the patient supplies over 50 per cent of the effort. 7. Use of aids to be independent is allowed. Evan Williamson., Barthel, D.W. ([id number]). Functional evaluation: the Barthel Index. [facility] (14)2. CHARLY Leal, Yair Rodrigues, Anika Garcia.[place], [address] [place], [place] [address] ().  Measuring the change indisability after inpatient rehabilitation; comparison of the responsiveness of the Barthel Index and Functional [place] Measure. Journal of Neurology, Neurosurgery, and Psychiatry, 66(4), 042-403. JOÃO Mojica, DANIE Hodges, & Melissa Carvalho M.A. (2004.) Assessment of post-stroke quality of life in cost-effectiveness studies: The usefulness of the Barthel Index and the EuroQoL-5D. Quality of Life Research, 13, 304-44         G codes: In compliance with CMSs Claims Based Outcome Reporting, the following G-code set was chosen for this patient based on their primary functional limitation being treated: The outcome measure chosen to determine the severity of the functional limitation was the Barthel Index with a score of 90/100 which was correlated with the impairment scale. ? Self Care:     - CURRENT STATUS: CI - 1%-19% impaired, limited or restricted    - GOAL STATUS: CI - 1%-19% impaired, limited or restricted    - D/C STATUS:  CI - 1%-19% impaired, limited or restricted       Occupational Therapy Evaluation Charge Determination   History Examination Decision-Making   LOW Complexity : Brief history review  LOW Complexity : 1-3 performance deficits relating to physical, cognitive , or psychosocial skils that result in activity limitations and / or participation restrictions  LOW Complexity : No comorbidities that affect functional and no verbal or physical assistance needed to complete eval tasks       Based on the above components, the patient evaluation is determined to be of the following complexity level: LOW   Pain:Pain Scale 1: Numeric (0 - 10)  Pain Intensity 1: 0              Activity Tolerance:   Good. Please refer to the flowsheet for vital signs taken during this treatment.   After treatment:   []  Patient left in no apparent distress sitting up in chair  [x]  Patient left in no apparent distress in bed  [x]  Call bell left within reach  [x]  Nursing notified  []  Caregiver present  []  Bed alarm activated    COMMUNICATION/EDUCATION:   Communication/Collaboration:  [x]      Home safety education was provided and the patient/caregiver indicated understanding. [x]      Patient/family have participated as able and agree with findings and recommendations. []      Patient is unable to participate in plan of care at this time.   Findings and recommendations were discussed with: Physical Therapist and Registered Nurse    Rashad Brown OT  Time Calculation: 9 mins

## 2017-06-26 DIAGNOSIS — E87.6 HYPOKALEMIA: ICD-10-CM

## 2017-06-26 RX ORDER — POTASSIUM CHLORIDE 20 MEQ/1
TABLET, EXTENDED RELEASE ORAL
Qty: 180 TAB | Refills: 1 | Status: SHIPPED | OUTPATIENT
Start: 2017-06-26 | End: 2018-01-24 | Stop reason: SDUPTHER

## 2017-06-26 NOTE — TELEPHONE ENCOUNTER
Received faxed refill request for this medication from the pharmacy that is on file.     Patient has an appointment scheduled on:     Tuesday, June 27, 2017 01:00 PM    potassium chloride (KLOR-CON M20) 20 mEq tablet  Normal, Disp-60 Tab, R-3

## 2017-06-26 NOTE — TELEPHONE ENCOUNTER
Last office visit 03/2017, last labs 06/2017 with outside provider.  Patient scheduled for a follow up appointment 07/2017

## 2017-06-27 ENCOUNTER — OFFICE VISIT (OUTPATIENT)
Dept: FAMILY MEDICINE CLINIC | Age: 62
End: 2017-06-27

## 2017-06-27 VITALS
SYSTOLIC BLOOD PRESSURE: 142 MMHG | BODY MASS INDEX: 28 KG/M2 | TEMPERATURE: 98.4 F | OXYGEN SATURATION: 95 % | HEART RATE: 79 BPM | HEIGHT: 64 IN | DIASTOLIC BLOOD PRESSURE: 84 MMHG | RESPIRATION RATE: 16 BRPM | WEIGHT: 164 LBS

## 2017-06-27 DIAGNOSIS — R26.89 BALANCE PROBLEM: ICD-10-CM

## 2017-06-27 DIAGNOSIS — E11.9 TYPE 2 DIABETES MELLITUS WITHOUT COMPLICATION, WITHOUT LONG-TERM CURRENT USE OF INSULIN (HCC): ICD-10-CM

## 2017-06-27 DIAGNOSIS — Z09 HOSPITAL DISCHARGE FOLLOW-UP: Primary | ICD-10-CM

## 2017-06-27 RX ORDER — CLOTRIMAZOLE AND BETAMETHASONE DIPROPIONATE 10; .64 MG/G; MG/G
CREAM TOPICAL
Refills: 1 | COMMUNITY
Start: 2017-03-30 | End: 2017-08-29 | Stop reason: ALTCHOICE

## 2017-06-27 NOTE — PATIENT INSTRUCTIONS

## 2017-06-27 NOTE — PROGRESS NOTES
HISTORY OF PRESENT ILLNESS  Fred Ram is a 57 y.o. female. She was seen for her recent hospitalization for dizziness and gait abnormality. HPI  Hospital Follow Up  Fred Ram is seen for follow up from recent admission to [facility] on 6/22/17. We reviewed the active problem list, medication list, allergies, lab results, imaging, hospitalist and Neurology summaries. She presented with dizziness and problem walking. She was in ER on 06/21/2017, was advised admission, went home as ahd to take care of  and returned on 06/22/2017 as had persistent Sx    Abnormal gait with right sided numbness   -CT head No acute abnormality. Small vessel ischemic changes, focal chronic  ischemia left frontal deep white matter, and right thalamic lacunar infarct. -MRI brain 1. No evidence for acute infarction. 2. No evidence for intracranial hemorrhage. 3. Mild atrophy. Extensive chronic small vessel ischemic disease the white  matter. Chronic lacunar infarcts in thalami bilaterally and left external  capsule. 4. Left maxillary sinus mucocele. -echo ef 55-60%   -carotids <50% stenosis bilaterally  -possibly inner ear issue, recommending f/u with ENT OP   -continue home ASA, statin  - neuro consult, not likely neurological issue     Her CBC,CMP, lipid, A1C results were normal except low Potassium, which was replaced    Review of Systems   Constitutional: Negative for chills, fever and malaise/fatigue. HENT: Negative for congestion, ear pain, sore throat and tinnitus. Eyes: Negative for blurred vision, double vision, pain and discharge. Respiratory: Negative for cough, shortness of breath and wheezing. Cardiovascular: Negative for chest pain, palpitations and leg swelling. Gastrointestinal: Negative for abdominal pain, blood in stool, constipation, diarrhea, nausea and vomiting. Genitourinary: Negative for dysuria, frequency, hematuria and urgency.    Musculoskeletal: Negative for back pain, joint pain and myalgias. Skin: Negative for rash. Neurological: Positive for dizziness. Negative for tremors, seizures and headaches. Endo/Heme/Allergies: Negative for polydipsia. Does not bruise/bleed easily. Psychiatric/Behavioral: Negative for depression and substance abuse. The patient is not nervous/anxious. Physical Exam   Constitutional: She is oriented to person, place, and time. She appears well-developed and well-nourished. HENT:   Head: Normocephalic and atraumatic. Right Ear: External ear normal.   Mouth/Throat: Oropharynx is clear and moist. No oropharyngeal exudate. Eyes: Conjunctivae and EOM are normal. Pupils are equal, round, and reactive to light. No scleral icterus. Neck: Normal range of motion. Neck supple. No JVD present. No thyromegaly present. Cardiovascular: Normal rate, regular rhythm, normal heart sounds and intact distal pulses. No murmur heard. Pulmonary/Chest: Effort normal and breath sounds normal. She has no wheezes. Abdominal: Soft. Bowel sounds are normal. She exhibits no distension and no mass. Musculoskeletal: Normal range of motion. She exhibits no edema or tenderness. Lymphadenopathy:     She has no cervical adenopathy. Neurological: She is alert and oriented to person, place, and time. She has normal reflexes. No cranial nerve deficit. Intact visual fields. Fundi are benign. PERRL, EOM's full, no nystagmus, no ptosis. Facial sensation is normal. Facial movement is symmetric. Palate is midline. Normal sternocleidomastoid strength. Tongue is midline. Hearing is intact bilaterally. Skin: Skin is warm and dry. No rash noted. She is not diaphoretic. Psychiatric: She has a normal mood and affect. Nursing note and vitals reviewed. ASSESSMENT and Filippo Rule was seen today for hospital follow up.     Diagnoses and all orders for this visit:    Hospital discharge follow-up    Balance problem  -     REFERRAL TO ENT-OTOLARYNGOLOGY    Type 2 diabetes mellitus without complication, without long-term current use of insulin ([facility])  -      DIABETES FOOT EXAM  -     MICROALBUMIN, UR, RAND W/ MICROALBUMIN/CREA RATIO    Discussed lifestyle issues and health guidance given  Patient was given an after visit summary which includes diagnoses, vital signs, current medications, instructions and references & authorized prescriptions . Results of labs will be conveyed to patient, once available. Pt verbalized instructions I provided and expressed understanding of discussion that was held today. Follow-up Disposition:  Return if symptoms worsen or fail to improve.

## 2017-06-27 NOTE — PROGRESS NOTES
Chief Complaint   Patient presents with   [facility] Follow Up     symptoms of numbness on right side starting at head to tips of fingers

## 2017-06-27 NOTE — MR AVS SNAPSHOT
Visit Information Date & Time Provider Department Dept. Phone Encounter #  
 6/27/2017  1:00 PM Keegan Juarez  [facility] [phone] [id number] Follow-up Instructions Return if symptoms worsen or fail to improve. Your Appointments 8/9/2017  9:20 AM  
ROUTINE CARE with Keegan Juarez MD  
[facility]) Appt Note: 4 month fasting follow up, DM HTN, CHO  
 [address] [phone]  
[phone]  
  
   
 [address] [address] Upcoming Health Maintenance Date Due  
 EYE EXAM RETINAL OR DILATED Q1 3/15/1963 Pneumococcal 19-64 Medium Risk (1 of 1 - PPSV23) 3/15/1972 DTaP/Tdap/Td series (1 - Tdap) 3/15/1974 ZOSTER VACCINE AGE 60> 3/15/2013 FOOT EXAM Q1 7/26/2017 MICROALBUMIN Q1 7/26/2017 INFLUENZA AGE 9 TO ADULT 8/1/2017 HEMOGLOBIN A1C Q6M 12/23/2017 PAP AKA CERVICAL CYTOLOGY 5/27/2018 LIPID PANEL Q1 6/23/2018 COLONOSCOPY 7/23/2018 BREAST CANCER SCRN MAMMOGRAM 11/28/2018 Allergies as of 6/27/2017  Review Complete On: 6/27/2017 By: Keegan Juarez MD  
 No Known Allergies Current Immunizations  Reviewed on 11/28/2016 Name Date Influenza Vaccine (Quad) PF 11/28/2016, 11/24/2015, 10/28/2014 Not reviewed this visit You Were Diagnosed With   
  
 Codes Comments Hospital discharge follow-up    -  Primary ICD-10-CM: [address] ICD-9-CM: V67.59 Balance problem     ICD-10-CM: R26.89 
ICD-9-CM: 781.99 Type 2 diabetes mellitus without complication, without long-term current use of insulin (HCC)     ICD-10-CM: E11.9 ICD-9-CM: 250.00 Vitals BP Pulse Temp Resp Height(growth percentile) Weight(growth percentile) 142/84 (BP 1 Location: Left arm, BP Patient Position: Sitting) 79 98.4 °F (36.9 °C) (Oral) 16 5' 3.5\" (1.613 m) 164 lb (74.4 kg) SpO2 BMI OB Status Smoking Status 95% 28.6 kg/m2 Postmenopausal Never Smoker Vitals History BMI and BSA Data Body Mass Index Body Surface Area  
 28.6 kg/m 2 1.83 m 2 Preferred Pharmacy Pharmacy Name Phone [id number] [place], [address] [facility] [phone] Your Updated Medication List  
  
   
This list is accurate as of: 6/27/17  1:47 PM.  Always use your most recent med list. amLODIPine 10 mg tablet Commonly known as:  [place] TAKE 1 TABLET BY MOUTH EVERY DAY  
  
 aspirin delayed-release 81 mg tablet Take  by mouth daily. clotrimazole-betamethasone topical cream  
Commonly known as:  LOTRISONE  
APPLY TO AFFECTED AREA 2 TIMES A DAY  
  
 glipiZIDE 5 mg tablet Commonly known as:  GLUCOTROL  
TAKE 1 TABLET BY MOUTH BEFORE BREAKFAST AND DINNER. losartan-hydroCHLOROthiazide 100-12.5 mg per tablet Commonly known as:  HYZAAR  
TAKE ONE TABLET BY MOUTH DAILY  
  
 metFORMIN 500 mg tablet Commonly known as:  GLUCOPHAGE  
TAKE 1 TABLET BY MOUTH TWO (2) TIMES DAILY (WITH MEALS). HOLD UNTIL Saturday 6/24  
  
 potassium chloride 20 mEq tablet Commonly known as:  KLOR-CON M20  
TAKE 1 TABLET BY MOUTH TWICE A DAY  
  
 pravastatin 10 mg tablet Commonly known as:  PRAVACHOL Take 1 Tab by mouth nightly. We Performed the Following  DIABETES FOOT EXAM [HM7 Custom] MICROALBUMIN, UR, RAND W/ MICROALBUMIN/CREA RATIO [id number] CPT(R)] REFERRAL TO ENT-OTOLARYNGOLOGY [GGO74 Custom] Comments:  
 Please evaluate patient for dizziness and balance problem. Neuro work up negative. Follow-up Instructions Return if symptoms worsen or fail to improve. Referral Information Referral ID Referred By Referred To  
  
 [id number] Cassidy Wan MD   
   [address] [place] [address] [address] [place] [address], [address] [facility] Phone: [phone] Fax: [phone] Visits Status Start Date End Date 1 New Request 6/27/17 6/27/18 If your referral has a status of pending review or denied, additional information will be sent to support the outcome of this decision. Patient Instructions Preventing Falls: Care Instructions Your Care Instructions Getting around your home safely can be a challenge if you have injuries or health problems that make it easy for you to fall. Loose rugs and furniture in walkways are among the dangers for many older people who have problems walking or who have poor eyesight. People who have conditions such as arthritis, osteoporosis, or dementia also have to be careful not to fall. You can make your home safer with a few simple measures. Follow-up care is a key part of your treatment and safety. Be sure to make and go to all appointments, and call your doctor if you are having problems. It's also a good idea to know your test results and keep a list of the medicines you take. How can you care for yourself at home? Taking care of yourself · You may get dizzy if you do not drink enough water. To prevent dehydration, drink plenty of fluids, enough so that your urine is light yellow or clear like water. Choose water and other caffeine-free clear liquids. If you have kidney, heart, or liver disease and have to limit fluids, talk with your doctor before you increase the amount of fluids you drink. · Exercise regularly to improve your strength, muscle tone, and balance. Walk if you can. Swimming may be a good choice if you cannot walk easily. · Have your vision and hearing checked each year or any time you notice a change. If you have trouble seeing and hearing, you might not be able to avoid objects and could lose your balance. · Know the side effects of the medicines you take. Ask your doctor or pharmacist whether the medicines you take can affect your balance. Sleeping pills or sedatives can affect your balance. · Limit the amount of alcohol you drink. Alcohol can impair your balance and other senses. · Ask your doctor whether calluses or corns on your feet need to be removed. If you wear loose-fitting shoes because of calluses or corns, you can lose your balance and fall. · Talk to your doctor if you have numbness in your feet. Preventing falls at home · Remove raised doorway thresholds, throw rugs, and clutter. Repair loose carpet or raised areas in the floor. · Move furniture and electrical cords to keep them out of walking paths. · Use nonskid floor wax, and wipe up spills right away, especially on ceramic tile floors. · If you use a walker or cane, put rubber tips on it. If you use crutches, clean the bottoms of them regularly with an abrasive pad, such as steel wool. · Keep your house well lit, especially [facility], and outside walkways. Use night-lights in areas such as hallways and bathrooms. Add extra light switches or use remote switches (such as switches that go on or off when you clap your hands) to make it easier to turn lights on if you have to get up during the night. · Install sturdy handrails on stairways. · Move items in your cabinets so that the things you use a lot are on the lower shelves (about waist level). · Keep a cordless phone and a flashlight with new batteries by your bed. If possible, put a phone in each of the main rooms of your house, or carry a cell phone in case you fall and cannot reach a phone. Or, you can wear a device around your neck or wrist. You push a button that sends a signal for help. · Wear low-heeled shoes that fit well and give your feet good support. Use footwear with nonskid soles. Check the heels and soles of your shoes for wear. Repair or replace worn heels or soles. · Do not wear socks without shoes on wood floors. · Walk on the grass when the sidewalks are slippery.  If you live in an area that gets snow and ice in the winter, sprinkle salt on slippery steps and sidewalks. Preventing falls in the bath · Install grab bars and nonskid mats inside and outside your shower or tub and near the toilet and sinks. · Use shower chairs and bath benches. · Use a hand-held shower head that will allow you to sit while showering. · Get into a tub or shower by putting the weaker leg in first. Get out of a tub or shower with your strong side first. 
· Repair loose toilet seats and consider installing a raised toilet seat to make getting on and off the toilet easier. · Keep your bathroom door unlocked while you are in the shower. Where can you learn more? Go to http://jania-chris.info/. Enter [id number] [id number] [phone] in the search box to learn more about \"Preventing Falls: Care Instructions. \" Current as of: August 4, 2016 Content Version: 11.3 © 0064-0475 [employer]. Care instructions adapted under license by [employer] (which disclaims liability or warranty for this information). If you have questions about a medical condition or this instruction, always ask your healthcare professional. [place] [address] any warranty or liability for your use of this information. Introducing [facility]! Tono Avendano introduces Proxio patient portal. Now you can access parts of your medical record, email your doctor's office, and request medication refills online. 1. In your internet browser, go to [email or web address] 51 [email or web address]! 2. Click on the First Time User? Click Here link in the Sign In box. You will see the New Member Sign Up page. 3. Enter your Proxio Access Code exactly as it appears below. You will not need to use this code after youve completed the sign-up process. If you do not sign up before the expiration date, you must request a new code. · Proxio Access Code: [email or web address] Expires: 9/25/2017  1:47 PM 
 
 4. Enter the last four digits of your Social Security Number (xxxx) and Date of Birth (mm/dd/yyyy) as indicated and click Submit. You will be taken to the next sign-up page. 5. Create a ReadyForZero ID. This will be your ReadyForZero login ID and cannot be changed, so think of one that is secure and easy to remember. 6. Create a ReadyForZero password. You can change your password at any time. 7. Enter your Password Reset Question and Answer. This can be used at a later time if you forget your password. 8. Enter your e-mail address. You will receive e-mail notification when new information is available in [address] 9. Click Sign Up. You can now view and download portions of your medical record. 10. Click the Download Summary menu link to download a portable copy of your medical information. If you have questions, please visit the Frequently Asked Questions section of the ReadyForZero website. Remember, ReadyForZero is NOT to be used for urgent needs. For medical emergencies, dial 911. Now available from your iPhone and Android! Please provide this summary of care documentation to your next provider. Your primary care clinician is listed as Rebeka Vargas. If you have any questions after today's visit, please call [phone].

## 2017-07-03 RX ORDER — AMLODIPINE BESYLATE 10 MG/1
TABLET ORAL
Qty: 30 TAB | Refills: 2 | Status: SHIPPED | OUTPATIENT
Start: 2017-07-03 | End: 2017-09-28 | Stop reason: SDUPTHER

## 2017-08-07 ENCOUNTER — TELEPHONE (OUTPATIENT)
Dept: FAMILY MEDICINE CLINIC | Age: 62
End: 2017-08-07

## 2017-08-07 NOTE — TELEPHONE ENCOUNTER
----- Message from [facility] sent at 8/7/2017  2:45 PM EDT -----  Regarding: Dr. Stefani Zapien  Pt checking on status of ENT test done 2 weeks ago. Best contact number [phone].

## 2017-08-08 NOTE — TELEPHONE ENCOUNTER
Spoke with patient in regards to ENT test that were completed 2 weeks ago. Patient made aware that we have not receive the results here in the office. Patient will give the ENT specialists a call and have them to fax over the results.

## 2017-08-16 ENCOUNTER — TELEPHONE (OUTPATIENT)
Dept: FAMILY MEDICINE CLINIC | Age: 62
End: 2017-08-16

## 2017-08-16 NOTE — TELEPHONE ENCOUNTER
----- Message from Rusty Howard sent at 8/16/2017 11:14 AM EDT -----  Regarding: /telephone  Pt returning a call to . Best contact number is [phone] or [phone].

## 2017-08-16 NOTE — TELEPHONE ENCOUNTER
Spoke with Mrs. Abreu, she informed me \" I am following up to see if Dr. Blanca Cook has received my results from the ENT doctor. \"  Patient was informed that nothing is noted in our system. Informed that I will send request to that office ( [facility]) to send results to our office. Informed patient that any testing completed that doctor should have reviewed with her, patient stated \" nothing was given to me, I was told it would be sent to PCP. \"  Nikhil Vargas LPN    Request sent to Dr. Latonya Garcia for patient visit and any testing completed to be sent to our office.

## 2017-08-18 ENCOUNTER — TELEPHONE (OUTPATIENT)
Dept: FAMILY MEDICINE CLINIC | Age: 62
End: 2017-08-18

## 2017-08-18 NOTE — TELEPHONE ENCOUNTER
Patient is calling, she is requesting that Dr. Erik Bolaños call her back with her \"results\" from her ENT Dr. Nicole Haas office. Advised patient as of right now we have not received anything from this office regarding \"results\" but it was requested by Dr. Jung Rojas nurse Jan Latham on 8/16/2017 and there is a process that is to be done on both ends, that is why everything is taking a couple of days. The patient is very unhappy about this, saying \"I want my results now, this is ridiculous, I had tests done on the 24th of last month and still do not know what is wrong with me\", I did try to calm the patient down and advised her that I would call Dr. Nicole Haas office myself with her on the line to get the results read to her. She agreed to this and I called Dr. Nicole Haas office, I got a representative for the physicians Lucas Mathis on the line and Lucas Mathis without hesitation agreed to let the patient know of her test results, I then patched the patient through, she began to argue with yung about how she did not want to hear her results from Dr. Nicole Haas office, she wanted them sent to her PCP because \"she dont trust no one else\". Lucas Mathis offered to have Dr. Radha Castillo call her himself, she refused this offer, she did not want to hear her results from Lucas Mathis, and just wanted her results sent to Dr. Erik Bolaños and Dr. Erik Bolaños call her. Lucas Mathis agreed she will do this and she will have Dr. Radha Castillo call the patient back just in case she changes her mind. The patient proceeded to say have a good day and disconnected the call.

## 2017-08-21 NOTE — TELEPHONE ENCOUNTER
Spoke to patient and informed about all results I havsilas received from Dr Jun Holland office. Also informed that all rest done for vertigo and middle ear were normal as per records. Also during hospitalization, her CT and MRI head were normal too. She still has same sx . Will get more evaluation, during her next visit in a week. Patient is in agreement.

## 2017-08-29 ENCOUNTER — OFFICE VISIT (OUTPATIENT)
Dept: FAMILY MEDICINE CLINIC | Age: 62
End: 2017-08-29

## 2017-08-29 VITALS
TEMPERATURE: 97.5 F | HEIGHT: 64 IN | BODY MASS INDEX: 27.49 KG/M2 | HEART RATE: 105 BPM | SYSTOLIC BLOOD PRESSURE: 140 MMHG | DIASTOLIC BLOOD PRESSURE: 86 MMHG | OXYGEN SATURATION: 98 % | WEIGHT: 161 LBS | RESPIRATION RATE: 16 BRPM

## 2017-08-29 DIAGNOSIS — E11.9 TYPE 2 DIABETES MELLITUS WITHOUT COMPLICATION, WITHOUT LONG-TERM CURRENT USE OF INSULIN (HCC): Primary | ICD-10-CM

## 2017-08-29 DIAGNOSIS — E89.0 HYPOTHYROIDISM, POSTSURGICAL: ICD-10-CM

## 2017-08-29 DIAGNOSIS — E78.5 HYPERLIPIDEMIA WITH TARGET LDL LESS THAN 100: ICD-10-CM

## 2017-08-29 DIAGNOSIS — N18.30 CKD (CHRONIC KIDNEY DISEASE) STAGE 3, GFR 30-59 ML/MIN (HCC): ICD-10-CM

## 2017-08-29 DIAGNOSIS — I10 ESSENTIAL HYPERTENSION: ICD-10-CM

## 2017-08-29 DIAGNOSIS — F51.02 ADJUSTMENT INSOMNIA: ICD-10-CM

## 2017-08-29 RX ORDER — TRAZODONE HYDROCHLORIDE 50 MG/1
25 TABLET ORAL
Qty: 30 TAB | Refills: 2 | Status: SHIPPED | OUTPATIENT
Start: 2017-08-29 | End: 2017-10-02 | Stop reason: SDUPTHER

## 2017-08-29 NOTE — PATIENT INSTRUCTIONS
Diabetes and Preventing Falls: Care Instructions  Your Care Instructions  If you are an older adult who has diabetes, you may have a higher risk of falling. Complications of diabetes--such as nerve damage, foot problems, and reduced vision--may increase your risk of a fall. Some of your medicines also may add to your risk. By making your home safer, you can lower your risk of falling. Doing things to prevent diabetes complications may also help to lower your risk. You can make your home safer with a few simple measures. Follow-up care is a key part of your treatment and safety. Be sure to make and go to all appointments, and call your doctor if you are having problems. It's also a good idea to know your test results and keep a list of the medicines you take. How can you care for yourself at home? Taking care of yourself  · Keep your blood sugar at a target level (which you set with your doctor). · Exercise regularly to improve your strength, muscle tone, and balance. Walk if you can. Swimming may be a good choice if you cannot walk easily. · Have your vision checked as often as your doctor recommends. It is usually once a year or more often if you have eye problems. · Know the side effects of the medicines you take. Ask your doctor or pharmacist whether the medicines you take can affect your balance. Sleeping pills or sedatives can affect your balance. · Limit the amount of alcohol you drink. Alcohol can impair your balance and other senses. · Have your doctor check your feet during each visit. If you have a foot problem, see your doctor. Preventing falls at home  · Remove raised doorway thresholds, throw rugs, and clutter. Repair loose carpet or raised areas in the floor. · Move furniture and electrical cords to keep them out of walking paths. · Use nonskid floor wax, and wipe up spills right away, especially on ceramic tile floors. · If you use a walker or cane, put rubber tips on it.  If you use crutches, clean the bottoms of them regularly with an abrasive pad, such as steel wool. · Keep your house well lit, especially [facility], and outside walkways. Use night-lights in areas such as hallways and bathrooms. Add extra light switches or use remote switches (such as switches that go on or off when you clap your hands) to make it easier to turn lights on if you have to get up during the night. · Install sturdy handrails on stairways. Put grab bars near your shower, bathtub, and toilet. · Store household items on low shelves so that you do not have to climb or reach high. Or use a reaching device that you can get at a medical supply store. If you have to climb for something, use a step stool with handrails, or ask someone to get it for you. · Keep a cordless phone and a flashlight with new batteries by your bed. If possible, put a phone in each of the main rooms of your house, or carry a cell phone in case you fall and cannot reach a phone. Or you can wear a device around your neck or wrist. You push a button that sends a signal for help. · Wear low-heeled shoes that fit well and give your feet good support. Use footwear with nonskid soles. Check the heels and soles of your shoes for wear. Repair or replace worn heels or soles. · Do not wear socks without shoes on wood floors. · Walk on the grass when the sidewalks are slippery. If you live in an area that gets snow and ice in the winter, sprinkle salt on slippery steps and sidewalks. Where can you learn more? Go to http://jania-chris.info/. Enter P310 in the search box to learn more about \"Diabetes and Preventing Falls: Care Instructions. \"  Current as of: August 4, 2016  Content Version: 11.3  © 7778-2024 [employer]. Care instructions adapted under license by [employer] (which disclaims liability or warranty for this information).  If you have questions about a medical condition or this instruction, always ask your healthcare professional. [place] [address] any warranty or liability for your use of this information.

## 2017-08-29 NOTE — LETTER
9/5/2017 1:29 PM 
 
Ms. Perrin [address] [place] [address] 
Gabriela  [phone] Dear Mei Marshall: 
 
Please find your most recent results below. Resulted Orders METABOLIC PANEL, COMPREHENSIVE Result Value Ref Range Glucose 49 (L) 65 - 99 mg/dL BUN 18 8 - 27 mg/dL Creatinine 1.07 (H) 0.57 - 1.00 mg/dL GFR est non-AA 55 (L) >59 mL/min/1.73 GFR est AA 63 >59 mL/min/1.73  
 BUN/Creatinine ratio 17 12 - 28 Sodium 144 134 - 144 mmol/L Potassium 3.4 (L) 3.5 - 5.2 mmol/L Chloride 96 96 - 106 mmol/L  
 CO2 32 (H) 18 - 29 mmol/L Calcium 10.4 (H) 8.7 - 10.3 mg/dL Protein, total 8.5 6.0 - 8.5 g/dL Albumin 4.3 3.6 - 4.8 g/dL GLOBULIN, TOTAL 4.2 1.5 - 4.5 g/dL A-G Ratio 1.0 (L) 1.2 - 2.2 Bilirubin, total 0.4 0.0 - 1.2 mg/dL Alk. phosphatase 75 39 - 117 IU/L  
 AST (SGOT) 13 0 - 40 IU/L  
 ALT (SGPT) 9 0 - 32 IU/L Narrative Performed at:  [address]  [id number] : Milena Linares MD, Phone:  [phone] HEMOGLOBIN A1C WITH EAG Result Value Ref Range Hemoglobin A1c 6.2 (H) 4.8 - 5.6 % Comment:  
            Pre-diabetes: 5.7 - 6.4 Diabetes: >6.4 Glycemic control for adults with diabetes: <7.0 Estimated average glucose 131 mg/dL Narrative Performed at:  [address]  [id number] : Milena Linares MD, Phone:  [phone] LIPID PANEL Result Value Ref Range Cholesterol, total 168 100 - 199 mg/dL Triglyceride 88 0 - 149 mg/dL HDL Cholesterol 67 >39 mg/dL VLDL, calculated 18 5 - 40 mg/dL LDL, calculated 83 0 - 99 mg/dL Narrative Performed at:  [facility] [address] [place] [address]  [id number] : Milena Linares MD, Phone:  [phone] MICROALBUMIN, UR, RAND W/ MICROALBUMIN/CREA RATIO Result Value Ref Range Creatinine, urine 55.7 Not Estab. mg/dL Microalbumin, urine 69.1 Not Estab. ug/mL Microalb/Creat ratio (ug/mg creat.) 124.1 (H) 0.0 - 30.0 mg/g creat Narrative Performed at:  [address]  [id number] : Duane Ladd MD, Phone:  [phone] CVD REPORT Result Value Ref Range INTERPRETATION NTAP   
 PDF IMAGE Not applicable Narrative Performed at:  [address] [place] [address]  [id number] : Mindy Capone PhD, Phone:  [phone] CKD REPORT Result Value Ref Range Interpretation Note Comment:  
   Supplement report is available. Narrative Performed at:  [address] [place] [address]  [id number] : Mindy Capone PhD, Phone:  [phone] RECOMMENDATIONS: 
Kidney function has improved, but Potassium level is still low. Needs to take Potassium supplement very regularly. Cholesterol and average blood sugar have been very normal, reassuring Urine positive for protein, likely from her recent BRODY. No change in medicines, to stay well hydrated. thanks Please call me if you have any questions: [phone] Sincerely, Teresa Guzmán MD

## 2017-08-29 NOTE — PROGRESS NOTES
Chief Complaint   Patient presents with    Diabetes    Cholesterol Problem    Hypertension    Thyroid Problem     1. Have you been to the ER, urgent care clinic since your last visit? Hospitalized since your last visit? No    2. Have you seen or consulted any other health care providers outside of the [address] [place] [address] since your last visit? Yes, ENT. Include any pap smears or colon screening.  No

## 2017-08-29 NOTE — PROGRESS NOTES
HISTORY OF PRESENT ILLNESS  Luisana Tyson is a 57 y.o. female. She was seen for 4 months follow up on diabetes, HTN, dyslipidemia. We reviewed her records from ENT including [facility]. All results so far including CT head, MRI head, ENT referral have jay reassuring. Today, patient admitted that she was taking Benadryl 50 mg for sleep and as her  is sick, was getting up frequently at night, having drowsiness in morning and during day. HPI  Cardiovascular Review  The patient has hypertension and hyperlipidemia. She reports taking medications as instructed, no medication side effects noted, home BP monitoring in range of 876-368'C systolic over 46-43'K diastolic, no TIA's, no chest pain on exertion, no dyspnea on exertion, no swelling of ankles, no orthostatic dizziness or lightheadedness, no orthopnea or paroxysmal nocturnal dyspnea, no palpitations, no muscle aches or pain. Diet and Lifestyle: generally follows a low fat low cholesterol diet, generally follows a low sodium diet, sedentary. Lab review: labs reviewed and discussed with patient. Medicines: Amlodipine 10 mg, Losartan/hctz 100/12.5, Pravastatin 10 mg, ASA 81 mg  She was referred to cardiology for abnormal EKG, had echocardiogram in 06/2015, normal result    Lab Results   Component Value Date/Time    Cholesterol, total 144 06/23/2017 04:15 AM    HDL Cholesterol 46 06/23/2017 04:15 AM    LDL, calculated 70.4 06/23/2017 04:15 AM    VLDL, calculated 27.6 06/23/2017 04:15 AM    Triglyceride 138 06/23/2017 04:15 AM    CHOL/HDL Ratio 3.1 06/23/2017 04:15 AM         Endocrine Review  She is seen for diabetes. Since last visit she reports no polyuria or polydipsia, no chest pain, dyspnea or TIA's, no numbness, tingling or pain in extremities, no unusual visual symptoms, no hypoglycemia, weight has decreased. Home glucose monitoring: is performed regularly, fasting values range 100-120. She reports medication compliance: compliant all of the time. Medication side effects: none. Diabetic diet compliance: compliant all of the time. Lab review: labs reviewed and discussed with patient. Eye exam: just had last week, will give call with name of provider. Urine negative for protein in 07/2016  On Glipizide 5 mg BID and Metformin 500 mg BID    Lab Results   Component Value Date/Time    Hemoglobin A1c 6.6 06/23/2017 04:19 AM          Endocrine Review  Patient is seen for followup of hypothyroidism and status post thyroid surgery sub total thyroidectomy. she also had parathyroidectomy at same time  Since last visit: no change   She reports medication compliance: n/a as she is not on Rx  She reports the following concerns/problems/med side effects: n/a. Lab review: labs reviewed and discussed with patient. Review of Systems   Constitutional: Negative for chills, fever and malaise/fatigue. HENT: Negative for congestion, ear pain, sore throat and tinnitus. Eyes: Negative for blurred vision, double vision, pain and discharge. Respiratory: Negative for cough, shortness of breath and wheezing. Cardiovascular: Negative for chest pain, palpitations and leg swelling. Gastrointestinal: Negative for abdominal pain, blood in stool, constipation, diarrhea, nausea and vomiting. Genitourinary: Negative for dysuria, frequency, hematuria and urgency. Musculoskeletal: Negative for back pain, joint pain and myalgias. Skin: Negative for rash. Neurological: Negative for dizziness, tremors, seizures and headaches. Balance issues   Endo/Heme/Allergies: Negative for polydipsia. Does not bruise/bleed easily. Psychiatric/Behavioral: Negative for depression and substance abuse. The patient is not nervous/anxious. Physical Exam   Constitutional: She is oriented to person, place, and time. She appears well-developed and well-nourished. No distress. HENT:   Head: Normocephalic and atraumatic.    Right Ear: Tympanic membrane and external ear normal. No drainage. Tympanic membrane is not injected. No middle ear effusion. No decreased hearing is noted. Left Ear: Tympanic membrane and external ear normal. No drainage. Tympanic membrane is not injected. No middle ear effusion. No decreased hearing is noted. Nose: Nose normal. No rhinorrhea. Mouth/Throat: Uvula is midline and oropharynx is clear and moist. No oropharyngeal exudate. Nasal mucosa congested, edematous   Eyes: Conjunctivae and EOM are normal. Pupils are equal, round, and reactive to light. No scleral icterus. Neck: Normal range of motion. Neck supple. No JVD present. No thyromegaly present. Cardiovascular: Normal rate, regular rhythm, normal heart sounds and intact distal pulses. Exam reveals no gallop and no friction rub. No murmur heard. Pulmonary/Chest: Effort normal and breath sounds normal. She has no wheezes. She has no rales. She exhibits no tenderness. Right breast exhibits no inverted nipple, no mass, no nipple discharge, no skin change and no tenderness. Left breast exhibits no inverted nipple, no mass, no nipple discharge, no skin change and no tenderness. Breasts are symmetrical.   Abdominal: Soft. Bowel sounds are normal. She exhibits no distension and no mass. There is no tenderness. Musculoskeletal: Normal range of motion. She exhibits no edema or tenderness. Feet:warm, good capillary refill, no trophic changes or ulcerative lesions, normal DP and PT pulses, normal monofilament exam and normal sensory exam     Lymphadenopathy:        Head (right side): No tonsillar adenopathy present. Head (left side): No tonsillar adenopathy present. She has no cervical adenopathy. Neurological: She is alert and oriented to person, place, and time. She has normal reflexes. No cranial nerve deficit. Skin: Skin is warm and dry. No rash noted. Psychiatric: She has a normal mood and affect.  Her behavior is normal. Judgment and thought content normal.   Nursing note and vitals reviewed. ASSESSMENT and PLAN  Diagnoses and all orders for this visit:    1. Type 2 diabetes mellitus without complication, without long-term current use of insulin ([facility])  -      DIABETES FOOT EXAM  -     METABOLIC PANEL, COMPREHENSIVE  -     HEMOGLOBIN A1C WITH EAG  -     MICROALBUMIN, UR, RAND W/ MICROALBUMIN/CREA RATIO    2. Hyperlipidemia with target LDL less than 465  -     METABOLIC PANEL, COMPREHENSIVE  -     LIPID PANEL    3. Hypothyroidism, postsurgical  -     METABOLIC PANEL, COMPREHENSIVE    4. Essential hypertension  -     METABOLIC PANEL, COMPREHENSIVE    5. CKD (chronic kidney disease) stage 3, GFR 30-59 ml/min    6. Adjustment insomnia  -     traZODone (DESYREL) 50 mg tablet; Take 0.5 Tabs by mouth nightly. balance problem likely from side effect of Benadryl. BP high from that too. Advised to stop it and will start low dose of Trazodone for sleep and anxiety related to her 's sickness. Discussed lifestyle issues and health guidance given  Patient was given an after visit summary which includes diagnoses, vital signs, current medications, instructions and references & authorized prescriptions . Results of labs will be conveyed to patient, once available. Pt verbalized instructions I provided and expressed understanding of discussion that was held today.   Follow-up Disposition:  Return in about 3 months (around 11/29/2017) for fasting, physical.

## 2017-08-29 NOTE — MR AVS SNAPSHOT
Visit Information Date & Time Provider Department Dept. Phone Encounter #  
 8/29/2017  3:40 PM Eren Jung MD [address] [place] [address] [phone] [id number] Follow-up Instructions Return in about 3 months (around 11/29/2017) for fasting, physical.  
  
Upcoming Health Maintenance Date Due  
 EYE EXAM RETINAL OR DILATED Q1 3/15/1963 Pneumococcal 19-64 Medium Risk (1 of 1 - PPSV23) 3/15/1972 DTaP/Tdap/Td series (1 - Tdap) 3/15/1974 ZOSTER VACCINE AGE 60> 1/15/2013 MICROALBUMIN Q1 7/26/2017 INFLUENZA AGE 9 TO ADULT 8/1/2017 HEMOGLOBIN A1C Q6M 12/23/2017 PAP AKA CERVICAL CYTOLOGY 5/27/2018 LIPID PANEL Q1 6/23/2018 FOOT EXAM Q1 6/27/2018 COLONOSCOPY 7/23/2018 BREAST CANCER SCRN MAMMOGRAM 11/28/2018 Allergies as of 8/29/2017  Review Complete On: 8/29/2017 By: Eren Jung MD  
 No Known Allergies Current Immunizations  Reviewed on 11/28/2016 Name Date Influenza Vaccine (Quad) PF 11/28/2016, 11/24/2015, 10/28/2014 Not reviewed this visit You Were Diagnosed With   
  
 Codes Comments Type 2 diabetes mellitus without complication, without long-term current use of insulin (HCC)    -  Primary ICD-10-CM: E11.9 ICD-9-CM: 250.00 Hyperlipidemia with target LDL less than 100     ICD-10-CM: E78.5 ICD-9-CM: 272.4 Hypothyroidism, postsurgical     ICD-10-CM: E89.0 ICD-9-CM: 244.0 Essential hypertension     ICD-10-CM: I10 
ICD-9-CM: 401.9 CKD (chronic kidney disease) stage 3, GFR 30-59 ml/min     ICD-10-CM: N18.3 ICD-9-CM: 220. 3 Adjustment insomnia     ICD-10-CM: F51.02 
ICD-9-CM: 307.41 Vitals BP Pulse Temp Resp Height(growth percentile) Weight(growth percentile) 140/86 (BP 1 Location: Right arm, BP Patient Position: Sitting) (!) 105 97.5 °F (36.4 °C) (Oral) 16 5' 3.5\" (1.613 m) 161 lb (73 kg) SpO2 BMI OB Status Smoking Status 98% 28.07 kg/m2 Postmenopausal Never Smoker Vitals History BMI and BSA Data Body Mass Index Body Surface Area 28.07 kg/m 2 1.81 m 2 Preferred Pharmacy Pharmacy Name Phone [id number] [address] [facility] [phone] Your Updated Medication List  
  
   
This list is accurate as of: 8/29/17  4:18 PM.  Always use your most recent med list. amLODIPine 10 mg tablet Commonly known as:  Job Dub TAKE 1 TABLET BY MOUTH EVERY DAY  
  
 aspirin delayed-release 81 mg tablet Take  by mouth daily. glipiZIDE 5 mg tablet Commonly known as:  GLUCOTROL  
TAKE 1 TABLET BY MOUTH BEFORE BREAKFAST AND DINNER. losartan-hydroCHLOROthiazide 100-12.5 mg per tablet Commonly known as:  HYZAAR  
TAKE ONE TABLET BY MOUTH DAILY  
  
 metFORMIN 500 mg tablet Commonly known as:  GLUCOPHAGE  
TAKE 1 TABLET BY MOUTH TWO (2) TIMES DAILY (WITH MEALS). HOLD UNTIL Saturday 6/24  
  
 potassium chloride 20 mEq tablet Commonly known as:  KLOR-CON M20  
TAKE 1 TABLET BY MOUTH TWICE A DAY  
  
 pravastatin 10 mg tablet Commonly known as:  PRAVACHOL Take 1 Tab by mouth nightly. traZODone 50 mg tablet Commonly known as:  Gurdeep Macadamia Take 0.5 Tabs by mouth nightly. Prescriptions Sent to Pharmacy Refills  
 traZODone (DESYREL) 50 mg tablet 2 Sig: Take 0.5 Tabs by mouth nightly. Class: Normal  
 Pharmacy: [id number] [facility] [id number]  [address] Ph #: [phone] Route: Oral  
  
We Performed the Following HEMOGLOBIN A1C WITH EAG [42902 CPT(R)]  DIABETES FOOT EXAM [7 Custom] LIPID PANEL [31328 CPT(R)] METABOLIC PANEL, COMPREHENSIVE [12377 CPT(R)] MICROALBUMIN, UR, RAND W/ MICROALBUMIN/CREA RATIO [id number] CPT(R)] Follow-up Instructions Return in about 3 months (around 11/29/2017) for fasting, physical.  
  
  
Patient Instructions Diabetes and Preventing Falls: Care Instructions Your Care Instructions If you are an older adult who has diabetes, you may have a higher risk of falling. Complications of diabetessuch as nerve damage, foot problems, and reduced visionmay increase your risk of a fall. Some of your medicines also may add to your risk. By making your home safer, you can lower your risk of falling. Doing things to prevent diabetes complications may also help to lower your risk. You can make your home safer with a few simple measures. Follow-up care is a key part of your treatment and safety. Be sure to make and go to all appointments, and call your doctor if you are having problems. It's also a good idea to know your test results and keep a list of the medicines you take. How can you care for yourself at home? Taking care of yourself · Keep your blood sugar at a target level (which you set with your doctor). · Exercise regularly to improve your strength, muscle tone, and balance. Walk if you can. Swimming may be a good choice if you cannot walk easily. · Have your vision checked as often as your doctor recommends. It is usually once a year or more often if you have eye problems. · Know the side effects of the medicines you take. Ask your doctor or pharmacist whether the medicines you take can affect your balance. Sleeping pills or sedatives can affect your balance. · Limit the amount of alcohol you drink. Alcohol can impair your balance and other senses. · Have your doctor check your feet during each visit. If you have a foot problem, see your doctor. Preventing falls at home · Remove raised doorway thresholds, throw rugs, and clutter. Repair loose carpet or raised areas in the floor. · Move furniture and electrical cords to keep them out of walking paths. · Use nonskid floor wax, and wipe up spills right away, especially on ceramic tile floors. · If you use a walker or cane, put rubber tips on it. If you use crutches, clean the bottoms of them regularly with an abrasive pad, such as steel wool. · Keep your house well lit, especially [place], and outside walkways. Use night-lights in areas such as hallways and bathrooms. Add extra light switches or use remote switches (such as switches that go on or off when you clap your hands) to make it easier to turn lights on if you have to get up during the night. · Install sturdy handrails on stairways. Put grab bars near your shower, bathtub, and toilet. · Store household items on low shelves so that you do not have to climb or reach high. Or use a reaching device that you can get at a medical supply store. If you have to climb for something, use a step stool with handrails, or ask someone to get it for you. · Keep a cordless phone and a flashlight with new batteries by your bed. If possible, put a phone in each of the main rooms of your house, or carry a cell phone in case you fall and cannot reach a phone. Or you can wear a device around your neck or wrist. You push a button that sends a signal for help. · Wear low-heeled shoes that fit well and give your feet good support. Use footwear with nonskid soles. Check the heels and soles of your shoes for wear. Repair or replace worn heels or soles. · Do not wear socks without shoes on wood floors. · Walk on the grass when the sidewalks are slippery. If you live in an area that gets snow and ice in the winter, sprinkle salt on slippery steps and sidewalks. Where can you learn more? Go to http://jania-chris.info/. Enter I066 in the search box to learn more about \"Diabetes and Preventing Falls: Care Instructions. \" Current as of: August 4, 2016 Content Version: 11.3 © 3656-9859 [employer].  Care instructions adapted under license by [employer]) (which disclaims liability or warranty for this information). If you have questions about a medical condition or this instruction, always ask your healthcare professional. [address] any warranty or liability for your use of this information. Introducing [facility] SERVICES! Rafael Bernal introduces Avinger patient portal. Now you can access parts of your medical record, email your doctor's office, and request medication refills online. 1. In your internet browser, go to [email or web address] 2. Click on the First Time User? Click Here link in the Sign In box. You will see the New Member Sign Up page. 3. Enter your Avinger Access Code exactly as it appears below. You will not need to use this code after youve completed the sign-up process. If you do not sign up before the expiration date, you must request a new code. · Avinger Access Code: [email or web address] Expires: 9/25/2017  1:47 PM 
 
4. Enter the last four digits of your Social Security Number (xxxx) and Date of Birth (mm/dd/yyyy) as indicated and click Submit. You will be taken to the next sign-up page. 5. Create a Avinger ID. This will be your Avinger login ID and cannot be changed, so think of one that is secure and easy to remember. 6. Create a Avinger password. You can change your password at any time. 7. Enter your Password Reset Question and Answer. This can be used at a later time if you forget your password. 8. Enter your e-mail address. You will receive e-mail notification when new information is available in [address] 9. Click Sign Up. You can now view and download portions of your medical record. 10. Click the Download Summary menu link to download a portable copy of your medical information. If you have questions, please visit the Frequently Asked Questions section of the A[employer]er website. Remember, [employer]er is NOT to be used for urgent needs. For medical emergencies, dial 911. Now available from your iPhone and Android! Please provide this summary of care documentation to your next provider. Your primary care clinician is listed as Mamie Leyden. If you have any questions after today's visit, please call [phone].

## 2017-09-01 ENCOUNTER — HOSPITAL ENCOUNTER (EMERGENCY)
Age: 62
Discharge: STILL ADMITTED OR ADMINISTRATIVE | End: 2017-09-01
Attending: STUDENT IN AN ORGANIZED HEALTH CARE EDUCATION/TRAINING PROGRAM
Payer: COMMERCIAL

## 2017-09-01 PROCEDURE — 75810000275 HC EMERGENCY DEPT VISIT NO LEVEL OF CARE

## 2017-09-02 LAB
ALBUMIN SERPL-MCNC: 4.3 G/DL (ref 3.6–4.8)
ALBUMIN/CREAT UR: 124.1 MG/G CREAT (ref 0–30)
ALBUMIN/GLOB SERPL: 1 {RATIO} (ref 1.2–2.2)
ALP SERPL-CCNC: 75 IU/L (ref 39–117)
ALT SERPL-CCNC: 9 IU/L (ref 0–32)
AST SERPL-CCNC: 13 IU/L (ref 0–40)
BILIRUB SERPL-MCNC: 0.4 MG/DL (ref 0–1.2)
BUN SERPL-MCNC: 18 MG/DL (ref 8–27)
BUN/CREAT SERPL: 17 (ref 12–28)
CALCIUM SERPL-MCNC: 10.4 MG/DL (ref 8.7–10.3)
CHLORIDE SERPL-SCNC: 96 MMOL/L (ref 96–106)
CHOLEST SERPL-MCNC: 168 MG/DL (ref 100–199)
CO2 SERPL-SCNC: 32 MMOL/L (ref 18–29)
CREAT SERPL-MCNC: 1.07 MG/DL (ref 0.57–1)
CREAT UR-MCNC: 55.7 MG/DL
EST. AVERAGE GLUCOSE BLD GHB EST-MCNC: 131 MG/DL
GLOBULIN SER CALC-MCNC: 4.2 G/DL (ref 1.5–4.5)
GLUCOSE SERPL-MCNC: 49 MG/DL (ref 65–99)
HBA1C MFR BLD: 6.2 % (ref 4.8–5.6)
HDLC SERPL-MCNC: 67 MG/DL
INTERPRETATION, 910389: NORMAL
INTERPRETATION: NORMAL
LDLC SERPL CALC-MCNC: 83 MG/DL (ref 0–99)
MICROALBUMIN UR-MCNC: 69.1 UG/ML
PDF IMAGE, 910387: NORMAL
POTASSIUM SERPL-SCNC: 3.4 MMOL/L (ref 3.5–5.2)
PROT SERPL-MCNC: 8.5 G/DL (ref 6–8.5)
SODIUM SERPL-SCNC: 144 MMOL/L (ref 134–144)
TRIGL SERPL-MCNC: 88 MG/DL (ref 0–149)
VLDLC SERPL CALC-MCNC: 18 MG/DL (ref 5–40)

## 2017-09-05 NOTE — PROGRESS NOTES
Please inform patient,  Kidney function has improved, but Potassium level is still low. Needs to take Potassium supplement very regularly. Cholesterol and average blood sugar have been very normal, reassuring  Urine positive for protein, likely from her recent BRODY. No change in medicines, to stay well hydrated.   thanks

## 2017-09-28 RX ORDER — AMLODIPINE BESYLATE 10 MG/1
TABLET ORAL
Qty: 30 TAB | Refills: 2 | Status: SHIPPED | OUTPATIENT
Start: 2017-09-28 | End: 2017-11-27 | Stop reason: SDUPTHER

## 2017-09-30 RX ORDER — GLIPIZIDE 5 MG/1
TABLET ORAL
Qty: 60 TAB | Refills: 3 | Status: SHIPPED | COMMUNITY
Start: 2017-09-30 | End: 2017-10-17 | Stop reason: SDUPTHER

## 2017-10-02 DIAGNOSIS — F51.02 ADJUSTMENT INSOMNIA: ICD-10-CM

## 2017-10-02 NOTE — TELEPHONE ENCOUNTER
----- Message from Isaak [address] sent at 10/2/2017 10:17 AM EDT -----  Regarding: Dr. Sung Hurt / Tania Karson: [phone]  [facility]. She was prescribed 1/2 pill but states that she needs whole pill.

## 2017-10-03 DIAGNOSIS — F51.02 ADJUSTMENT INSOMNIA: ICD-10-CM

## 2017-10-03 RX ORDER — TRAZODONE HYDROCHLORIDE 50 MG/1
25 TABLET ORAL
Qty: 30 TAB | Refills: 2 | Status: SHIPPED | OUTPATIENT
Start: 2017-10-03 | End: 2017-10-03 | Stop reason: SDUPTHER

## 2017-10-03 RX ORDER — TRAZODONE HYDROCHLORIDE 50 MG/1
50 TABLET ORAL
Qty: 30 TAB | Refills: 2 | Status: SHIPPED | OUTPATIENT
Start: 2017-10-03 | End: 2018-03-02 | Stop reason: SDUPTHER

## 2017-10-03 NOTE — TELEPHONE ENCOUNTER
Tried contacting pt to discuss her need to increase dose of trazodone, left voicemail asking her to return call to office

## 2017-10-17 RX ORDER — GLIPIZIDE 5 MG/1
TABLET ORAL
Qty: 180 TAB | Refills: 1 | Status: SHIPPED | OUTPATIENT
Start: 2017-10-17

## 2017-10-17 NOTE — TELEPHONE ENCOUNTER
Patient was left an voice message informing that a prescription as written by Dr. Sydnie Valiente has been sent to her pharmacy.

## 2017-10-17 NOTE — TELEPHONE ENCOUNTER
Pharmacy on file verified. Pharmacy requesting 90 day refill.     Requested Prescriptions     Pending Prescriptions Disp Refills    glipiZIDE (GLUCOTROL) 5 mg tablet 60 Tab 3

## 2017-10-20 RX ORDER — CLOTRIMAZOLE AND BETAMETHASONE DIPROPIONATE 10; .64 MG/G; MG/G
CREAM TOPICAL
Qty: 45 G | Refills: 1 | Status: SHIPPED | OUTPATIENT
Start: 2017-10-20

## 2017-10-23 RX ORDER — LOSARTAN POTASSIUM AND HYDROCHLOROTHIAZIDE 12.5; 1 MG/1; MG/1
TABLET ORAL
Qty: 30 TAB | Refills: 5 | Status: SHIPPED | OUTPATIENT
Start: 2017-10-23

## 2017-10-26 DIAGNOSIS — E78.5 HYPERLIPIDEMIA WITH TARGET LDL LESS THAN 100: ICD-10-CM

## 2017-10-26 RX ORDER — PRAVASTATIN SODIUM 10 MG/1
TABLET ORAL
Qty: 90 TAB | Refills: 1 | Status: SHIPPED | OUTPATIENT
Start: 2017-10-26

## 2017-11-27 RX ORDER — AMLODIPINE BESYLATE 10 MG/1
TABLET ORAL
Qty: 30 TAB | Refills: 1 | Status: SHIPPED | OUTPATIENT
Start: 2017-11-27 | End: 2017-12-27 | Stop reason: SDUPTHER

## 2017-12-27 NOTE — TELEPHONE ENCOUNTER
Chief Complaint   Patient presents with    Medication Refill     Amlodipine 10 mg      Received fax request for refill of amlodipine 10 mg. Patient last office visit 8/29/17.

## 2017-12-28 ENCOUNTER — TELEPHONE (OUTPATIENT)
Dept: FAMILY MEDICINE CLINIC | Age: 62
End: 2017-12-28

## 2017-12-28 RX ORDER — AMLODIPINE BESYLATE 10 MG/1
10 TABLET ORAL DAILY
Qty: 90 TAB | Refills: 0 | Status: SHIPPED | OUTPATIENT
Start: 2017-12-28 | End: 2018-03-24 | Stop reason: SDUPTHER

## 2017-12-28 NOTE — TELEPHONE ENCOUNTER
Patient was informed that prescription for Amlodipine 10 mg has been sent to pharmacy as written by Dr. Kya Smith.

## 2017-12-28 NOTE — TELEPHONE ENCOUNTER
Chief Complaint   Patient presents with   [facility] Follow Up     Patient being hospitalized for two days. Patient informed me that she was hospitalized for two days for observation due to recent episode of being unable to speak. Patient stated episode occurred \" It was about December 17, 2017 I was in the hospital for 2 days. I was unable to talk and unaware of anything. I was taken to [facility]. The doctor informed me that I had an \"TIA\" and I am scheduled to go back to a doctor at [facility] on January 30, 2018 , I think cardiologist.  \"  Patient informed me that she was prescribed Atorvastatin 20 mg take one tablet daily and Aspirin 325 mg take one tablet daily. Patient has stopped taking pravastatin 10 mg and aspirin 81 mg.

## 2018-01-24 DIAGNOSIS — E87.6 HYPOKALEMIA: ICD-10-CM

## 2018-01-24 RX ORDER — POTASSIUM CHLORIDE 1500 MG/1
TABLET, EXTENDED RELEASE ORAL
Qty: 180 TAB | Refills: 1 | Status: SHIPPED | OUTPATIENT
Start: 2018-01-24

## 2018-03-02 DIAGNOSIS — F51.02 ADJUSTMENT INSOMNIA: ICD-10-CM

## 2018-03-02 RX ORDER — TRAZODONE HYDROCHLORIDE 50 MG/1
TABLET ORAL
Qty: 30 TAB | Refills: 2 | Status: SHIPPED | OUTPATIENT
Start: 2018-03-02 | End: 2018-06-08 | Stop reason: SDUPTHER

## 2018-03-29 ENCOUNTER — HOSPITAL ENCOUNTER (EMERGENCY)
Age: 63
Discharge: HOME | End: 2018-03-29
Attending: EMERGENCY MEDICINE
Payer: COMMERCIAL

## 2018-03-29 VITALS
TEMPERATURE: 97.9 F | HEART RATE: 93 BPM | HEIGHT: 63 IN | OXYGEN SATURATION: 98 % | DIASTOLIC BLOOD PRESSURE: 90 MMHG | BODY MASS INDEX: 27.11 KG/M2 | SYSTOLIC BLOOD PRESSURE: 136 MMHG | RESPIRATION RATE: 18 BRPM | WEIGHT: 153 LBS

## 2018-03-29 DIAGNOSIS — E16.2 HYPOGLYCEMIA: Primary | ICD-10-CM

## 2018-03-29 DIAGNOSIS — E87.6 ACUTE HYPOKALEMIA: ICD-10-CM

## 2018-03-29 LAB
ALBUMIN SERPL-MCNC: 4.3 G/DL (ref 3.4–5)
ALBUMIN/GLOB SERPL: 0.8 {RATIO} (ref 0.8–1.7)
ALP SERPL-CCNC: 118 U/L (ref 45–117)
ALT SERPL-CCNC: 17 U/L (ref 13–56)
ANION GAP SERPL CALC-SCNC: 11 MMOL/L (ref 3–18)
AST SERPL-CCNC: 13 U/L (ref 15–37)
BASOPHILS # BLD: 0 K/UL (ref 0–0.06)
BASOPHILS NFR BLD: 0 % (ref 0–2)
BILIRUB SERPL-MCNC: 0.6 MG/DL (ref 0.2–1)
BUN SERPL-MCNC: 28 MG/DL (ref 7–18)
BUN/CREAT SERPL: 21 (ref 12–20)
CALCIUM SERPL-MCNC: 10.6 MG/DL (ref 8.5–10.1)
CHLORIDE SERPL-SCNC: 97 MMOL/L (ref 100–108)
CO2 SERPL-SCNC: 27 MMOL/L (ref 21–32)
CREAT SERPL-MCNC: 1.33 MG/DL (ref 0.6–1.3)
DIFFERENTIAL METHOD BLD: NORMAL
EOSINOPHIL # BLD: 0.1 K/UL (ref 0–0.4)
EOSINOPHIL NFR BLD: 1 % (ref 0–5)
ERYTHROCYTE [DISTWIDTH] IN BLOOD BY AUTOMATED COUNT: 12.8 % (ref 11.6–14.5)
GLOBULIN SER CALC-MCNC: 5.2 G/DL (ref 2–4)
GLUCOSE BLD STRIP.AUTO-MCNC: 220 MG/DL (ref 70–110)
GLUCOSE BLD STRIP.AUTO-MCNC: 354 MG/DL (ref 70–110)
GLUCOSE SERPL-MCNC: 270 MG/DL (ref 74–99)
HCT VFR BLD AUTO: 43 % (ref 35–45)
HGB BLD-MCNC: 14.6 G/DL (ref 12–16)
LYMPHOCYTES # BLD: 1.7 K/UL (ref 0.9–3.6)
LYMPHOCYTES NFR BLD: 27 % (ref 21–52)
MCH RBC QN AUTO: 30.9 PG (ref 24–34)
MCHC RBC AUTO-ENTMCNC: 34 G/DL (ref 31–37)
MCV RBC AUTO: 90.9 FL (ref 74–97)
MONOCYTES # BLD: 0.4 K/UL (ref 0.05–1.2)
MONOCYTES NFR BLD: 7 % (ref 3–10)
NEUTS SEG # BLD: 4.2 K/UL (ref 1.8–8)
NEUTS SEG NFR BLD: 65 % (ref 40–73)
PLATELET # BLD AUTO: 203 K/UL (ref 135–420)
PMV BLD AUTO: 10.4 FL (ref 9.2–11.8)
POTASSIUM SERPL-SCNC: 3.1 MMOL/L (ref 3.5–5.5)
PROT SERPL-MCNC: 9.5 G/DL (ref 6.4–8.2)
RBC # BLD AUTO: 4.73 M/UL (ref 4.2–5.3)
SODIUM SERPL-SCNC: 135 MMOL/L (ref 136–145)
WBC # BLD AUTO: 6.4 K/UL (ref 4.6–13.2)

## 2018-03-29 PROCEDURE — 99283 EMERGENCY DEPT VISIT LOW MDM: CPT

## 2018-03-29 PROCEDURE — 85025 COMPLETE CBC W/AUTO DIFF WBC: CPT | Performed by: EMERGENCY MEDICINE

## 2018-03-29 PROCEDURE — 82962 GLUCOSE BLOOD TEST: CPT

## 2018-03-29 PROCEDURE — 74011250637 HC RX REV CODE- 250/637: Performed by: EMERGENCY MEDICINE

## 2018-03-29 PROCEDURE — 80053 COMPREHEN METABOLIC PANEL: CPT | Performed by: EMERGENCY MEDICINE

## 2018-03-29 RX ORDER — POTASSIUM CHLORIDE 20 MEQ/1
40 TABLET, EXTENDED RELEASE ORAL
Status: COMPLETED | OUTPATIENT
Start: 2018-03-29 | End: 2018-03-29

## 2018-03-29 RX ADMIN — POTASSIUM CHLORIDE 40 MEQ: 1500 TABLET, FILM COATED, EXTENDED RELEASE ORAL at 03:27

## 2018-03-29 NOTE — ED TRIAGE NOTES
Pt presents by EMS after her blood glucose dropped to 40. EMS administered oral glucose and D50 en route after which BG came up to 466. Pt still shaky on arrival but states she feels better.

## 2018-03-29 NOTE — ED PROVIDER NOTES
HPI Comments: Carlos Chery is a 70 y.o. Female with h/o niddm who was noted to have slurred speech tonight, diff getting per daughter, called ems who noted glucose of 40, pt given oral glucose, iv dextrose with return to loc, resolved speech diff. Pt states she has not ate much in last 2 days. No fever, nvd, abd pain, cough, chest pain, new meds. Sx were severe now resolved. The history is provided by the patient (daughter). Past Medical History:   Diagnosis Date    Diabetes ([address]     Hypertension     Echo 6-25-15- borderline LVH, normal EF- CAV     Hypothyroid        Past Surgical History:   Procedure Laterality Date    HX  SECTION          HX CHOLECYSTECTOMY      HX COLONOSCOPY      HX PARATHYROIDECTOMY      had 2 glands removed    HX THYROIDECTOMY           Family History:   Problem Relation Age of Onset    Hypertension Mother     Hypertension Brother     Hypertension Maternal Aunt     No Known Problems Father        Social History     Social History    Marital status:      Spouse name: N/A    Number of children: N/A    Years of education: N/A     Occupational History    Not on file. Social History Main Topics    Smoking status: Never Smoker    Smokeless tobacco: Never Used    Alcohol use No    Drug use: No    Sexual activity: Not on file     Other Topics Concern    Not on file     Social History Narrative         ALLERGIES: Review of patient's allergies indicates no known allergies. Review of Systems   Constitutional: Positive for diaphoresis (initially now resolved). Negative for fever. HENT: Negative for sore throat and trouble swallowing. Eyes: Positive for visual disturbance (blurred now back to nl). Respiratory: Negative for cough and shortness of breath. Cardiovascular: Negative for chest pain. Gastrointestinal: Negative for abdominal pain. Endocrine: Negative for polyuria. Genitourinary: Negative for difficulty urinating. Musculoskeletal: Positive for gait problem (resolved). Skin: Negative for rash. Allergic/Immunologic: Negative for immunocompromised state. Neurological: Positive for light-headedness. Negative for syncope. Psychiatric/Behavioral: Positive for confusion (resolved) and sleep disturbance. Vitals:    03/29/18 0156   BP: (!) 153/100   Pulse: 93   Resp: 18   Temp: 97.9 °F (36.6 °C)   SpO2: 98%   Weight: 69.4 kg (153 lb)   Height: 5' 3\" (1.6 m)            Physical Exam   Constitutional: She is oriented to person, place, and time. She appears well-developed and well-nourished. No distress. HENT:   Head: Normocephalic and atraumatic. Right Ear: External ear normal.   Left Ear: External ear normal.   Nose: Nose normal.   Mouth/Throat: Uvula is midline, oropharynx is clear and moist and mucous membranes are normal.   Eyes: Conjunctivae are normal. No scleral icterus. Neck: Neck supple. Cardiovascular: Normal rate, regular rhythm, normal heart sounds and intact distal pulses. Pulmonary/Chest: Effort normal and breath sounds normal.   Abdominal: Soft. There is no tenderness. Musculoskeletal: She exhibits no edema. Neurological: She is alert and oriented to person, place, and time. No cranial nerve deficit (3-12). Gait normal.   Skin: Skin is warm and dry. She is not diaphoretic. Psychiatric: Her behavior is normal.   Nursing note and vitals reviewed.        [facility]      ED Course       Procedures    Vitals:  Patient Vitals for the past 12 hrs:   Temp Pulse Resp BP SpO2   03/29/18 0156 97.9 °F (36.6 °C) 93 18 (!) 153/100 98 %         Medications ordered:   Medications   potassium chloride (K-DUR, KLOR-CON) SR tablet 40 mEq (40 mEq Oral Given 3/29/18 0327)         Lab findings:  Recent Results (from the past 12 hour(s))   GLUCOSE, POC    Collection Time: 03/29/18  1:55 AM   Result Value Ref Range    Glucose (POC) 220 (H) 70 - 110 mg/dL   CBC WITH AUTOMATED DIFF    Collection Time: 03/29/18  2:12 AM Result Value Ref Range    WBC 6.4 4.6 - 13.2 K/uL    RBC 4.73 4.20 - 5.30 M/uL    HGB 14.6 12.0 - 16.0 g/dL    HCT 43.0 35.0 - 45.0 %    MCV 90.9 74.0 - 97.0 FL    MCH 30.9 24.0 - 34.0 PG    MCHC 34.0 31.0 - 37.0 g/dL    RDW 12.8 11.6 - 14.5 %    PLATELET 531 965 - 352 K/uL    MPV 10.4 9.2 - 11.8 FL    NEUTROPHILS 65 40 - 73 %    LYMPHOCYTES 27 21 - 52 %    MONOCYTES 7 3 - 10 %    EOSINOPHILS 1 0 - 5 %    BASOPHILS 0 0 - 2 %    ABS. NEUTROPHILS 4.2 1.8 - 8.0 K/UL    ABS. LYMPHOCYTES 1.7 0.9 - 3.6 K/UL    ABS. MONOCYTES 0.4 0.05 - 1.2 K/UL    ABS. EOSINOPHILS 0.1 0.0 - 0.4 K/UL    ABS. BASOPHILS 0.0 0.0 - 0.06 K/UL    DF AUTOMATED     METABOLIC PANEL, COMPREHENSIVE    Collection Time: 03/29/18  2:45 AM   Result Value Ref Range    Sodium 135 (L) 136 - 145 mmol/L    Potassium 3.1 (L) 3.5 - 5.5 mmol/L    Chloride 97 (L) 100 - 108 mmol/L    CO2 27 21 - 32 mmol/L    Anion gap 11 3.0 - 18 mmol/L    Glucose 270 (H) 74 - 99 mg/dL    BUN 28 (H) 7.0 - 18 MG/DL    Creatinine 1.33 (H) 0.6 - 1.3 MG/DL    BUN/Creatinine ratio 21 (H) 12 - 20      GFR est AA 49 (L) >60 ml/min/1.73m2    GFR est non-AA 40 (L) >60 ml/min/1.73m2    Calcium 10.6 (H) 8.5 - 10.1 MG/DL    Bilirubin, total 0.6 0.2 - 1.0 MG/DL    ALT (SGPT) 17 13 - 56 U/L    AST (SGOT) 13 (L) 15 - 37 U/L    Alk. phosphatase 118 (H) 45 - 117 U/L    Protein, total 9.5 (H) 6.4 - 8.2 g/dL    Albumin 4.3 3.4 - 5.0 g/dL    Globulin 5.2 (H) 2.0 - 4.0 g/dL    A-G Ratio 0.8 0.8 - 1.7     GLUCOSE, POC    Collection Time: 03/29/18  4:09 AM   Result Value Ref Range    Glucose (POC) 354 (H) 70 - 110 mg/dL       EKG interpretation by ED Physician:      X-Ray, CT or other radiology findings or impressions:  No orders to display       Progress notes, Consult notes or additional Procedure notes:   Glucose stable here.  Doubt need for admission, further testing, observation  I have discussed with patient and/or family/sig other the results, interpretation of any imaging if performed, suspected diagnosis and treatment plan to include instructions regarding the diagnoses listed to which understanding was expressed with all questions answered      Reevaluation of patient:   stable    Disposition:  Diagnosis:   1. Hypoglycemia    2. Acute hypokalemia        Disposition: home    Follow-up Information     Follow up With Details Comments Freddy Kerr MD Schedule an appointment as soon as possible for a visit  [address] [facility] EMERGENCY DEPT  If symptoms worsen [address] [facility].  [phone]            Patient's Medications   Start Taking    No medications on file   Continue Taking    AMLODIPINE (NORVASC) 10 MG TABLET    TAKE 1 TAB BY MOUTH DAILY. ASPIRIN DELAYED-RELEASE 81 MG TABLET    Take  by mouth daily. CLOTRIMAZOLE-BETAMETHASONE (LOTRISONE) TOPICAL CREAM    APPLY TO AFFECTED AREA 2 TIMES A DAY    GLIPIZIDE (GLUCOTROL) 5 MG TABLET    TAKE 1 TABLET BY MOUTH BEFORE BREAKFAST AND DINNER. KLOR-CON M20 20 MEQ TABLET    TAKE 1 TABLET BY MOUTH TWICE A DAY    LOSARTAN-HYDROCHLOROTHIAZIDE (HYZAAR) 100-12.5 MG PER TABLET    TAKE ONE TABLET BY MOUTH DAILY    METFORMIN (GLUCOPHAGE) 500 MG TABLET    TAKE 1 TABLET BY MOUTH TWO (2) TIMES DAILY (WITH MEALS). HOLD UNTIL Saturday 6/24    PRAVASTATIN (PRAVACHOL) 10 MG TABLET    TAKE 1 TABLET BY MOUTH NIGHTLY    TRAZODONE (DESYREL) 50 MG TABLET    TAKE 1 TAB BY MOUTH NIGHTLY.    These Medications have changed    No medications on file   Stop Taking    No medications on file

## 2018-05-07 RX ORDER — AMLODIPINE BESYLATE 10 MG/1
TABLET ORAL
Qty: 30 TAB | Refills: 0 | Status: SHIPPED | OUTPATIENT
Start: 2018-05-07

## 2018-06-08 DIAGNOSIS — F51.02 ADJUSTMENT INSOMNIA: ICD-10-CM

## 2018-06-09 RX ORDER — TRAZODONE HYDROCHLORIDE 50 MG/1
TABLET ORAL
Qty: 10 TAB | Refills: 0 | Status: SHIPPED | OUTPATIENT
Start: 2018-06-09

## 2018-06-28 DIAGNOSIS — F51.02 ADJUSTMENT INSOMNIA: ICD-10-CM

## 2018-06-28 RX ORDER — TRAZODONE HYDROCHLORIDE 50 MG/1
TABLET ORAL
Qty: 10 TAB | Refills: 0 | OUTPATIENT
Start: 2018-06-28

## 2018-07-23 DIAGNOSIS — E87.6 HYPOKALEMIA: ICD-10-CM

## 2018-07-23 RX ORDER — POTASSIUM CHLORIDE 1500 MG/1
TABLET, EXTENDED RELEASE ORAL
Qty: 180 TAB | Refills: 1 | OUTPATIENT
Start: 2018-07-23

## 2018-08-10 DIAGNOSIS — E87.6 HYPOKALEMIA: ICD-10-CM

## 2018-08-11 RX ORDER — POTASSIUM CHLORIDE 1500 MG/1
TABLET, EXTENDED RELEASE ORAL
Qty: 180 TAB | Refills: 1 | OUTPATIENT
Start: 2018-08-11

## 2018-08-11 NOTE — TELEPHONE ENCOUNTER
Patient needs an appointment. Far due for blood work. Requesting appointment  for last 3 refills. No further refills until seen.   thanks

## 2020-07-28 ENCOUNTER — APPOINTMENT (OUTPATIENT)
Dept: CT IMAGING | Age: 65
DRG: 193 | End: 2020-07-28
Attending: PHYSICIAN ASSISTANT
Payer: MEDICARE

## 2020-07-28 ENCOUNTER — APPOINTMENT (OUTPATIENT)
Dept: GENERAL RADIOLOGY | Age: 65
DRG: 193 | End: 2020-07-28
Attending: PHYSICIAN ASSISTANT
Payer: MEDICARE

## 2020-07-28 ENCOUNTER — HOSPITAL ENCOUNTER (INPATIENT)
Age: 65
LOS: 7 days | Discharge: HOME | DRG: 193 | End: 2020-08-04
Attending: EMERGENCY MEDICINE | Admitting: HOSPITALIST
Payer: MEDICARE

## 2020-07-28 DIAGNOSIS — N30.00 ACUTE CYSTITIS WITHOUT HEMATURIA: ICD-10-CM

## 2020-07-28 DIAGNOSIS — E87.0 HYPERNATREMIA: ICD-10-CM

## 2020-07-28 DIAGNOSIS — J18.9 COMMUNITY ACQUIRED PNEUMONIA OF RIGHT LUNG, UNSPECIFIED PART OF LUNG: ICD-10-CM

## 2020-07-28 DIAGNOSIS — Z20.822 SUSPECTED COVID-19 VIRUS INFECTION: ICD-10-CM

## 2020-07-28 DIAGNOSIS — G93.41 ACUTE METABOLIC ENCEPHALOPATHY: Primary | ICD-10-CM

## 2020-07-28 PROBLEM — Z86.73 HISTORY OF CVA (CEREBROVASCULAR ACCIDENT): Status: ACTIVE | Noted: 2020-07-28

## 2020-07-28 LAB
ALBUMIN SERPL-MCNC: 2.5 G/DL (ref 3.4–5)
ALBUMIN/GLOB SERPL: 0.6 {RATIO} (ref 0.8–1.7)
ALP SERPL-CCNC: 129 U/L (ref 45–117)
ALT SERPL-CCNC: 48 U/L (ref 13–56)
AMPHET UR QL SCN: NEGATIVE
ANION GAP SERPL CALC-SCNC: 6 MMOL/L (ref 3–18)
APPEARANCE UR: ABNORMAL
AST SERPL-CCNC: 43 U/L (ref 10–38)
ATRIAL RATE: 85 BPM
BACTERIA URNS QL MICRO: ABNORMAL /HPF
BARBITURATES UR QL SCN: NEGATIVE
BASOPHILS # BLD: 0 K/UL (ref 0–0.1)
BASOPHILS NFR BLD: 0 % (ref 0–2)
BENZODIAZ UR QL: NEGATIVE
BILIRUB SERPL-MCNC: 0.3 MG/DL (ref 0.2–1)
BILIRUB UR QL: NEGATIVE
BUN SERPL-MCNC: 42 MG/DL (ref 7–18)
BUN/CREAT SERPL: 38 (ref 12–20)
CALCIUM SERPL-MCNC: 10 MG/DL (ref 8.5–10.1)
CALCULATED P AXIS, ECG09: 75 DEGREES
CALCULATED R AXIS, ECG10: 28 DEGREES
CALCULATED T AXIS, ECG11: 161 DEGREES
CANNABINOIDS UR QL SCN: NEGATIVE
CHLORIDE SERPL-SCNC: 125 MMOL/L (ref 100–111)
CK MB CFR SERPL CALC: 2 % (ref 0–4)
CK MB SERPL-MCNC: 3.1 NG/ML (ref 5–25)
CK SERPL-CCNC: 157 U/L (ref 26–192)
CO2 SERPL-SCNC: 22 MMOL/L (ref 21–32)
COCAINE UR QL SCN: NEGATIVE
COLOR UR: ABNORMAL
CREAT SERPL-MCNC: 1.12 MG/DL (ref 0.6–1.3)
CRP SERPL-MCNC: 1.4 MG/DL (ref 0–0.3)
D DIMER PPP FEU-MCNC: 12.24 UG/ML(FEU)
DIAGNOSIS, 93000: NORMAL
DIFFERENTIAL METHOD BLD: ABNORMAL
EOSINOPHIL # BLD: 0 K/UL (ref 0–0.4)
EOSINOPHIL NFR BLD: 0 % (ref 0–5)
EPITH CASTS URNS QL MICRO: NEGATIVE /LPF (ref 0–5)
ERYTHROCYTE [DISTWIDTH] IN BLOOD BY AUTOMATED COUNT: 13.3 % (ref 11.6–14.5)
ERYTHROCYTE [SEDIMENTATION RATE] IN BLOOD: 44 MM/HR (ref 0–30)
ETHANOL SERPL-MCNC: <3 MG/DL (ref 0–3)
FERRITIN SERPL-MCNC: 152 NG/ML (ref 8–388)
GLOBULIN SER CALC-MCNC: 4.5 G/DL (ref 2–4)
GLUCOSE SERPL-MCNC: 197 MG/DL (ref 74–99)
GLUCOSE UR STRIP.AUTO-MCNC: NEGATIVE MG/DL
HCT VFR BLD AUTO: 38.2 % (ref 35–45)
HDSCOM,HDSCOM: NORMAL
HGB BLD-MCNC: 12.1 G/DL (ref 12–16)
HGB UR QL STRIP: NEGATIVE
KETONES UR QL STRIP.AUTO: ABNORMAL MG/DL
LEUKOCYTE ESTERASE UR QL STRIP.AUTO: ABNORMAL
LYMPHOCYTES # BLD: 3.1 K/UL (ref 0.9–3.6)
LYMPHOCYTES NFR BLD: 22 % (ref 21–52)
MCH RBC QN AUTO: 31.7 PG (ref 24–34)
MCHC RBC AUTO-ENTMCNC: 31.7 G/DL (ref 31–37)
MCV RBC AUTO: 100 FL (ref 74–97)
METHADONE UR QL: NEGATIVE
MONOCYTES # BLD: 0.5 K/UL (ref 0.05–1.2)
MONOCYTES NFR BLD: 4 % (ref 3–10)
MUCOUS THREADS URNS QL MICRO: ABNORMAL /LPF
NEUTS SEG # BLD: 10.3 K/UL (ref 1.8–8)
NEUTS SEG NFR BLD: 74 % (ref 40–73)
NITRITE UR QL STRIP.AUTO: POSITIVE
OPIATES UR QL: NEGATIVE
P-R INTERVAL, ECG05: 112 MS
PCP UR QL: NEGATIVE
PH UR STRIP: 5 [PH] (ref 5–8)
PLATELET # BLD AUTO: 273 K/UL (ref 135–420)
PMV BLD AUTO: 11.7 FL (ref 9.2–11.8)
POTASSIUM SERPL-SCNC: 4.4 MMOL/L (ref 3.5–5.5)
PROT SERPL-MCNC: 7 G/DL (ref 6.4–8.2)
PROT UR STRIP-MCNC: 30 MG/DL
Q-T INTERVAL, ECG07: 340 MS
QRS DURATION, ECG06: 70 MS
QTC CALCULATION (BEZET), ECG08: 404 MS
RBC # BLD AUTO: 3.82 M/UL (ref 4.2–5.3)
RBC #/AREA URNS HPF: NEGATIVE /HPF (ref 0–5)
SODIUM SERPL-SCNC: 153 MMOL/L (ref 136–145)
SP GR UR REFRACTOMETRY: 1.02 (ref 1–1.03)
SPERM URNS QL MICRO: ABNORMAL
TROPONIN I SERPL-MCNC: <0.02 NG/ML (ref 0–0.04)
TSH SERPL DL<=0.05 MIU/L-ACNC: 2.4 UIU/ML (ref 0.36–3.74)
UROBILINOGEN UR QL STRIP.AUTO: 1 EU/DL (ref 0.2–1)
VENTRICULAR RATE, ECG03: 85 BPM
WBC # BLD AUTO: 13.9 K/UL (ref 4.6–13.2)
WBC URNS QL MICRO: NEGATIVE /HPF (ref 0–4)

## 2020-07-28 PROCEDURE — 87086 URINE CULTURE/COLONY COUNT: CPT

## 2020-07-28 PROCEDURE — 82728 ASSAY OF FERRITIN: CPT

## 2020-07-28 PROCEDURE — 93005 ELECTROCARDIOGRAM TRACING: CPT

## 2020-07-28 PROCEDURE — 84443 ASSAY THYROID STIM HORMONE: CPT

## 2020-07-28 PROCEDURE — 77010033678 HC OXYGEN DAILY

## 2020-07-28 PROCEDURE — 85379 FIBRIN DEGRADATION QUANT: CPT

## 2020-07-28 PROCEDURE — 71045 X-RAY EXAM CHEST 1 VIEW: CPT

## 2020-07-28 PROCEDURE — 65270000029 HC RM PRIVATE

## 2020-07-28 PROCEDURE — 82550 ASSAY OF CK (CPK): CPT

## 2020-07-28 PROCEDURE — 99285 EMERGENCY DEPT VISIT HI MDM: CPT

## 2020-07-28 PROCEDURE — 87635 SARS-COV-2 COVID-19 AMP PRB: CPT

## 2020-07-28 PROCEDURE — 87186 SC STD MICRODIL/AGAR DIL: CPT

## 2020-07-28 PROCEDURE — 96365 THER/PROPH/DIAG IV INF INIT: CPT

## 2020-07-28 PROCEDURE — 85652 RBC SED RATE AUTOMATED: CPT

## 2020-07-28 PROCEDURE — 87040 BLOOD CULTURE FOR BACTERIA: CPT

## 2020-07-28 PROCEDURE — 70450 CT HEAD/BRAIN W/O DYE: CPT

## 2020-07-28 PROCEDURE — 80053 COMPREHEN METABOLIC PANEL: CPT

## 2020-07-28 PROCEDURE — 85025 COMPLETE CBC W/AUTO DIFF WBC: CPT

## 2020-07-28 PROCEDURE — 51701 INSERT BLADDER CATHETER: CPT

## 2020-07-28 PROCEDURE — 74011000258 HC RX REV CODE- 258: Performed by: PHYSICIAN ASSISTANT

## 2020-07-28 PROCEDURE — 80307 DRUG TEST PRSMV CHEM ANLYZR: CPT

## 2020-07-28 PROCEDURE — 87077 CULTURE AEROBIC IDENTIFY: CPT

## 2020-07-28 PROCEDURE — 81001 URINALYSIS AUTO W/SCOPE: CPT

## 2020-07-28 PROCEDURE — 74011250636 HC RX REV CODE- 250/636: Performed by: PHYSICIAN ASSISTANT

## 2020-07-28 PROCEDURE — 86140 C-REACTIVE PROTEIN: CPT

## 2020-07-28 RX ORDER — SODIUM CHLORIDE 9 MG/ML
100 INJECTION, SOLUTION INTRAVENOUS ONCE
Status: DISPENSED | OUTPATIENT
Start: 2020-07-28 | End: 2020-07-29

## 2020-07-28 RX ORDER — ACETAMINOPHEN 325 MG/1
650 TABLET ORAL
Status: DISCONTINUED | OUTPATIENT
Start: 2020-07-28 | End: 2020-08-04 | Stop reason: HOSPADM

## 2020-07-28 RX ORDER — ENOXAPARIN SODIUM 100 MG/ML
40 INJECTION SUBCUTANEOUS DAILY
Status: DISCONTINUED | OUTPATIENT
Start: 2020-07-29 | End: 2020-08-04 | Stop reason: HOSPADM

## 2020-07-28 RX ORDER — AMLODIPINE BESYLATE 10 MG/1
10 TABLET ORAL DAILY
Status: DISCONTINUED | OUTPATIENT
Start: 2020-07-29 | End: 2020-08-04 | Stop reason: HOSPADM

## 2020-07-28 RX ORDER — PRAVASTATIN SODIUM 20 MG/1
10 TABLET ORAL
Status: DISCONTINUED | OUTPATIENT
Start: 2020-07-28 | End: 2020-08-04 | Stop reason: HOSPADM

## 2020-07-28 RX ORDER — POLYETHYLENE GLYCOL 3350 17 G/17G
17 POWDER, FOR SOLUTION ORAL DAILY PRN
Status: DISCONTINUED | OUTPATIENT
Start: 2020-07-28 | End: 2020-08-04 | Stop reason: HOSPADM

## 2020-07-28 RX ORDER — SODIUM CHLORIDE 0.9 % (FLUSH) 0.9 %
5-40 SYRINGE (ML) INJECTION EVERY 8 HOURS
Status: DISCONTINUED | OUTPATIENT
Start: 2020-07-28 | End: 2020-08-04 | Stop reason: HOSPADM

## 2020-07-28 RX ORDER — INSULIN LISPRO 100 [IU]/ML
INJECTION, SOLUTION INTRAVENOUS; SUBCUTANEOUS
Status: DISCONTINUED | OUTPATIENT
Start: 2020-07-29 | End: 2020-08-04 | Stop reason: HOSPADM

## 2020-07-28 RX ORDER — ONDANSETRON 2 MG/ML
4 INJECTION INTRAMUSCULAR; INTRAVENOUS
Status: DISCONTINUED | OUTPATIENT
Start: 2020-07-28 | End: 2020-08-04 | Stop reason: HOSPADM

## 2020-07-28 RX ORDER — DEXAMETHASONE 4 MG/1
10 TABLET ORAL DAILY
Status: DISCONTINUED | OUTPATIENT
Start: 2020-07-29 | End: 2020-08-01

## 2020-07-28 RX ORDER — SODIUM CHLORIDE 0.9 % (FLUSH) 0.9 %
5-40 SYRINGE (ML) INJECTION AS NEEDED
Status: DISCONTINUED | OUTPATIENT
Start: 2020-07-28 | End: 2020-08-04 | Stop reason: HOSPADM

## 2020-07-28 RX ORDER — PROMETHAZINE HYDROCHLORIDE 25 MG/1
12.5 TABLET ORAL
Status: DISCONTINUED | OUTPATIENT
Start: 2020-07-28 | End: 2020-08-04 | Stop reason: HOSPADM

## 2020-07-28 RX ORDER — ASPIRIN 81 MG/1
81 TABLET ORAL DAILY
Status: DISCONTINUED | OUTPATIENT
Start: 2020-07-29 | End: 2020-08-04 | Stop reason: HOSPADM

## 2020-07-28 RX ORDER — SODIUM CHLORIDE 450 MG/100ML
100 INJECTION, SOLUTION INTRAVENOUS CONTINUOUS
Status: DISCONTINUED | OUTPATIENT
Start: 2020-07-28 | End: 2020-08-04 | Stop reason: HOSPADM

## 2020-07-28 RX ORDER — ACETAMINOPHEN 650 MG/1
650 SUPPOSITORY RECTAL
Status: DISCONTINUED | OUTPATIENT
Start: 2020-07-28 | End: 2020-08-04 | Stop reason: HOSPADM

## 2020-07-28 RX ORDER — MAGNESIUM SULFATE 100 %
4 CRYSTALS MISCELLANEOUS AS NEEDED
Status: DISCONTINUED | OUTPATIENT
Start: 2020-07-28 | End: 2020-08-04 | Stop reason: HOSPADM

## 2020-07-28 RX ADMIN — SODIUM CHLORIDE 1000 ML: 900 INJECTION, SOLUTION INTRAVENOUS at 18:32

## 2020-07-28 RX ADMIN — CEFTRIAXONE 1 G: 1 INJECTION, POWDER, FOR SOLUTION INTRAMUSCULAR; INTRAVENOUS at 18:41

## 2020-07-28 RX ADMIN — CEFTRIAXONE 1 G: 1 INJECTION, POWDER, FOR SOLUTION INTRAMUSCULAR; INTRAVENOUS at 19:24

## 2020-07-28 RX ADMIN — AZITHROMYCIN MONOHYDRATE 500 MG: 500 INJECTION, POWDER, LYOPHILIZED, FOR SOLUTION INTRAVENOUS at 19:26

## 2020-07-28 NOTE — ED PROVIDER NOTES
EMERGENCY DEPARTMENT HISTORY AND PHYSICAL EXAM      Date: 7/28/2020  Patient Name: Carter Sandhoff    History of Presenting Illness     No chief complaint on file. History Provided By: Patient's daugther    HPI: Carter Sandhoff, 77 y.o. female PMHx significant for hypertension, DM, hypothyroidism, CVA presents via ambulance to the ED for low SpO2. Daughter states patient and her physical therapist took her O2 sat today, and it was low. Daughter unsure the exact number. She states patient is not on oxygen at home. Patient lives with daughter. Daughter states that the past week patient has not been talking, which includes does not know daughter's name, son-in-law's name and her opening. Daughter states she thought it was dementia. Daughter denies cough, congestion, fever, vomiting, diarrhea. Daughter states she was recently diagnosed with COVID on 7/2. Daughter reports at baseline patient has right-sided weakness. There are no other complaints, changes, or physical findings at this time. PCP: Isadora Riddle MD    No current facility-administered medications on file prior to encounter. Current Outpatient Medications on File Prior to Encounter   Medication Sig Dispense Refill    traZODone (DESYREL) 50 mg tablet TAKE 1 TAB BY MOUTH NIGHTLY. 10 Tab 0    amLODIPine (NORVASC) 10 mg tablet TAKE 1 TABLET BY MOUTH EVERY DAY 30 Tab 0    KLOR-CON M20 20 mEq tablet TAKE 1 TABLET BY MOUTH TWICE A  Tab 1    pravastatin (PRAVACHOL) 10 mg tablet TAKE 1 TABLET BY MOUTH NIGHTLY 90 Tab 1    losartan-hydroCHLOROthiazide (HYZAAR) 100-12.5 mg per tablet TAKE ONE TABLET BY MOUTH DAILY 30 Tab 5    clotrimazole-betamethasone (LOTRISONE) topical cream APPLY TO AFFECTED AREA 2 TIMES A DAY 45 g 1    glipiZIDE (GLUCOTROL) 5 mg tablet TAKE 1 TABLET BY MOUTH BEFORE BREAKFAST AND DINNER. 180 Tab 1    metFORMIN (GLUCOPHAGE) 500 mg tablet TAKE 1 TABLET BY MOUTH TWO (2) TIMES DAILY (WITH MEALS).  HOLD UNTIL Saturday  180 Tab 1    aspirin delayed-release 81 mg tablet Take  by mouth daily. Past History     Past Medical History:  Past Medical History:   Diagnosis Date    Diabetes ([address]     Hypertension     Echo 6-25-15- borderline LVH, normal EF- CAV     Hypothyroid        Past Surgical History:  Past Surgical History:   Procedure Laterality Date    HX  SECTION          HX CHOLECYSTECTOMY      HX COLONOSCOPY      HX PARATHYROIDECTOMY      had 2 glands removed    HX THYROIDECTOMY         Family History:  Family History   Problem Relation Age of Onset    Hypertension Mother     Hypertension Brother     Hypertension Maternal Aunt     No Known Problems Father        Social History:  Social History     Tobacco Use    Smoking status: Never Smoker    Smokeless tobacco: Never Used   Substance Use Topics    Alcohol use: No     Alcohol/week: 0.0 standard drinks    Drug use: No       Allergies:  No Known Allergies      Review of Systems   Review of Systems   Skin: Negative for color change, pallor, rash and wound. All other systems reviewed and are negative. Physical Exam   Physical Exam  Vitals signs and nursing note reviewed. Constitutional:       General: She is not in acute distress. Appearance: She is well-developed. HENT:      Head: Normocephalic and atraumatic. Eyes:      Conjunctiva/sclera: Conjunctivae normal.      Comments: PERRL   Cardiovascular:      Rate and Rhythm: Normal rate and regular rhythm. Heart sounds: Normal heart sounds. Pulmonary:      Effort: Pulmonary effort is normal. No respiratory distress. Breath sounds: Normal breath sounds. Comments: Lungs CTA  Not working to breathe  Abdominal:      General: Bowel sounds are normal. There is no distension. Palpations: Abdomen is soft. Comments: Abdomen soft nontender  Nondistended  No guarding or rigidity   Musculoskeletal: Normal range of motion. Skin:     General: Skin is warm. Findings: No rash. Neurological:      Mental Status: She is alert and oriented to person, place, and time. Comments: Pt not oriented to person, place or time  Pt does not perform tasks when asked  Responds to painful stimuli   Psychiatric:         Behavior: Behavior normal.         Diagnostic Study Results     Labs -     Recent Results (from the past 12 hour(s))   EKG, 12 LEAD, INITIAL    Collection Time: 07/28/20  3:04 PM   Result Value Ref Range    Ventricular Rate 85 BPM    Atrial Rate 85 BPM    P-R Interval 112 ms    QRS Duration 70 ms    Q-T Interval 340 ms    QTC Calculation (Bezet) 404 ms    Calculated P Axis 75 degrees    Calculated R Axis 28 degrees    Calculated T Axis 161 degrees    Diagnosis       Normal sinus rhythm  Nonspecific ST and T wave abnormality  Abnormal ECG  No previous ECGs available  Confirmed by Maurice Lynn ([id number]) on 7/28/2020 3:47:11 PM     CBC WITH AUTOMATED DIFF    Collection Time: 07/28/20  3:08 PM   Result Value Ref Range    WBC 13.9 (H) 4.6 - 13.2 K/uL    RBC 3.82 (L) 4.20 - 5.30 M/uL    HGB 12.1 12.0 - 16.0 g/dL    HCT 38.2 35.0 - 45.0 %    .0 (H) 74.0 - 97.0 FL    MCH 31.7 24.0 - 34.0 PG    MCHC 31.7 31.0 - 37.0 g/dL    RDW 13.3 11.6 - 14.5 %    PLATELET 289 989 - 673 K/uL    MPV 11.7 9.2 - 11.8 FL    NEUTROPHILS 74 (H) 40 - 73 %    LYMPHOCYTES 22 21 - 52 %    MONOCYTES 4 3 - 10 %    EOSINOPHILS 0 0 - 5 %    BASOPHILS 0 0 - 2 %    ABS. NEUTROPHILS 10.3 (H) 1.8 - 8.0 K/UL    ABS. LYMPHOCYTES 3.1 0.9 - 3.6 K/UL    ABS. MONOCYTES 0.5 0.05 - 1.2 K/UL    ABS. EOSINOPHILS 0.0 0.0 - 0.4 K/UL    ABS.  BASOPHILS 0.0 0.0 - 0.1 K/UL    DF AUTOMATED     METABOLIC PANEL, COMPREHENSIVE    Collection Time: 07/28/20  3:08 PM   Result Value Ref Range    Sodium 153 (H) 136 - 145 mmol/L    Potassium 4.4 3.5 - 5.5 mmol/L    Chloride 125 (H) 100 - 111 mmol/L    CO2 22 21 - 32 mmol/L    Anion gap 6 3.0 - 18 mmol/L    Glucose 197 (H) 74 - 99 mg/dL    BUN 42 (H) 7.0 - 18 MG/DL Creatinine 1.12 0.6 - 1.3 MG/DL    BUN/Creatinine ratio 38 (H) 12 - 20      GFR est AA 59 (L) >60 ml/min/1.73m2    GFR est non-AA 49 (L) >60 ml/min/1.73m2    Calcium 10.0 8.5 - 10.1 MG/DL    Bilirubin, total 0.3 0.2 - 1.0 MG/DL    ALT (SGPT) 48 13 - 56 U/L    AST (SGOT) 43 (H) 10 - 38 U/L    Alk.  phosphatase 129 (H) 45 - 117 U/L    Protein, total 7.0 6.4 - 8.2 g/dL    Albumin 2.5 (L) 3.4 - 5.0 g/dL    Globulin 4.5 (H) 2.0 - 4.0 g/dL    A-G Ratio 0.6 (L) 0.8 - 1.7     D DIMER    Collection Time: 07/28/20  3:08 PM   Result Value Ref Range    D DIMER 12.24 (H) <0.46 ug/ml(FEU)   CARDIAC PANEL,(CK, CKMB & TROPONIN)    Collection Time: 07/28/20  3:08 PM   Result Value Ref Range    CK - MB 3.1 <3.6 ng/ml    CK-MB Index 2.0 0.0 - 4.0 %     26 - 192 U/L    Troponin-I, QT <0.02 0.0 - 0.045 NG/ML   FERRITIN    Collection Time: 07/28/20  3:08 PM   Result Value Ref Range    Ferritin 152 8 - 388 NG/ML   SED RATE (ESR)    Collection Time: 07/28/20  3:08 PM   Result Value Ref Range    Sed rate, automated 44 (H) 0 - 30 mm/hr   C REACTIVE PROTEIN, QT    Collection Time: 07/28/20  3:08 PM   Result Value Ref Range    C-Reactive protein 1.4 (H) 0 - 0.3 mg/dL   TSH 3RD GENERATION    Collection Time: 07/28/20  3:08 PM   Result Value Ref Range    TSH 2.40 0.36 - 3.74 uIU/mL   ETHYL ALCOHOL    Collection Time: 07/28/20  3:08 PM   Result Value Ref Range    ALCOHOL(ETHYL),SERUM <3 0 - 3 MG/DL   URINALYSIS W/ RFLX MICROSCOPIC    Collection Time: 07/28/20  4:00 PM   Result Value Ref Range    Color DARK YELLOW      Appearance CLOUDY      Specific gravity 1.023 1.005 - 1.030      pH (UA) 5.0 5.0 - 8.0      Protein 30 (A) NEG mg/dL    Glucose Negative NEG mg/dL    Ketone TRACE (A) NEG mg/dL    Bilirubin Negative NEG      Blood Negative NEG      Urobilinogen 1.0 0.2 - 1.0 EU/dL    Nitrites Positive (A) NEG      Leukocyte Esterase SMALL (A) NEG     DRUG SCREEN, URINE    Collection Time: 07/28/20  4:00 PM   Result Value Ref Range BENZODIAZEPINES Negative NEG      BARBITURATES Negative NEG      THC (TH-CANNABINOL) Negative NEG      OPIATES Negative NEG      PCP(PHENCYCLIDINE) Negative NEG      COCAINE Negative NEG      AMPHETAMINES Negative NEG      METHADONE Negative NEG      HDSCOM (NOTE)    URINE MICROSCOPIC ONLY    Collection Time: 07/28/20  4:00 PM   Result Value Ref Range    WBC Negative 0 - 4 /hpf    RBC Negative 0 - 5 /hpf    Epithelial cells Negative 0 - 5 /lpf    Bacteria 3+ (A) NEG /hpf    Mucus 3+ (A) NEG /lpf    Spermatozoa 1+    CULTURE, BLOOD    Collection Time: 07/28/20  5:44 PM    Specimen: Blood   Result Value Ref Range    Special Requests: PERIPHERAL      Culture result: PENDING        Radiologic Studies -   CT HEAD WO CONT   Final Result   IMPRESSION:      No acute intracranial abnormality. XR CHEST PORT   Final Result   IMPRESSION:      Patient rotated to left. Faint subtle parenchymal opacities in the right lung,   possibly artifact from rotation. CTA CHEST W OR W WO CONT    (Results Pending)     CT Results  (Last 48 hours)               07/28/20 1644  CT HEAD WO CONT Final result    Impression:  IMPRESSION:       No acute intracranial abnormality. Narrative:  EXAM: CT head       INDICATION: Rule out intracranial hemorrhage. COMPARISON: None. TECHNIQUE: Axial CT imaging of the head was performed without intravenous   contrast. Standard multiplanar coronal and sagittal reformatted images were   obtained and are included in interpretation. One or more dose reduction techniques were used on this CT: automated exposure   control, adjustment of the mAs and/or kVp according to patient size, and   iterative reconstruction techniques. The specific techniques used on this CT   exam have been documented in the patient's electronic medical record.   Digital   Imaging and Communications in Medicine (DICOM) format image data are available   to nonaffiliated external healthcare facilities or entities on a secure, media   free, reciprocally searchable basis with patient authorization for at least a   12-month period after this study. _______________       FINDINGS:       BRAIN AND POSTERIOR FOSSA: Atrophy. Patchy periventricular, deep and subcortical   white matter hypoattenuation which is nonspecific but likely represents chronic   ischemic changes. No evidence of acute large vessel transcortical infarct or   acute parenchymal hemorrhage. No midline shift or hydrocephalus. Probable old   left parietal infarct. EXTRA-AXIAL SPACES AND MENINGES: There are no abnormal extra-axial fluid   collections. CALVARIUM: Intact. SINUSES: Clear. OTHER: None.       _______________               CXR Results  (Last 48 hours)               07/28/20 1614  XR CHEST PORT Final result    Impression:  IMPRESSION:       Patient rotated to left. Faint subtle parenchymal opacities in the right lung,   possibly artifact from rotation. Narrative:  EXAM: XR CHEST PORT       CLINICAL INDICATION/HISTORY: chest pain   -Additional: None       COMPARISON: None       TECHNIQUE: Portable frontal view of the chest       _______________       FINDINGS:       SUPPORT DEVICES: None. HEART AND MEDIASTINUM: Cardiomediastinal silhouette within normal limits. LUNGS AND PLEURAL SPACES: Patient rotated to left. Faint subtle parenchymal   opacities in the right lung, possibly artifact from rotation. No dense   consolidation, large effusion or pneumothorax.       _______________                 Medical Decision Making   I am the first provider for this patient. I reviewed the vital signs, available nursing notes, past medical history, past surgical history, family history and social history. Vital Signs-Reviewed the patient's vital signs.   Patient Vitals for the past 12 hrs:   Temp Pulse Resp BP SpO2   07/28/20 1929  72 16 130/66 100 %   07/28/20 1910 98.2 °F (36.8 °C) 79 18 129/72 100 %   07/28/20 1652  82 19 115/66 99 %   07/28/20 1610 97.4 °F (36.3 °C) 86 19 132/78 100 %   07/28/20 1517     (!) 88 %   07/28/20 1510 98.2 °F (36.8 °C) 83  115/68 94 %   07/28/20 1500  82  105/64 94 %         EKG interpretation: (Preliminary)  Rhythm: normal sinus rhythm; and regular . Rate (approx.): 85; Axis: left axis deviation; NH interval: normal; QRS interval: normal ; ST/T wave: T wave inverted; Other findings: left atrial enlargement. Similar to previous EKG from 6/2017. No acute ischemic changes. Records Reviewed: Nursing Notes, Old Medical Records and Previous electrocardiograms    Provider Notes (Medical Decision Making):   DDx: COVID, PNA, UTI, AMS, Dehydration, Electrolyte abnormality, ACS    76 yo F who presents with AMS report from daughter. Pt not alert to person or place. Per daughter, AMS x 1 week. Daughter diagnosed with COVID 1 month ago. UA shows bacteria in urine. Rocephin ordered. Possible infiltrates on CXR. Treated with rocephin and azithro in ED. CTA ordered. Hypernatremia with AMS. Patient admitted for metabolic encephalopathy and further management. ED Course:   Initial assessment performed. The patients presenting problems have been discussed, and they are in agreement with the care plan formulated and outlined with them. I have encouraged them to ask questions as they arise throughout their visit. Roberta 137 with brother, pt's emergency contact. He states he is not able to provider information about his sister because he has not talked to her in 3-4 months. He provided daughter's number. Catarinogeorgiemasoud 137 with daughter, who states patient lives with her. She states prior to arrival in home PT checked O2 level - decreased O2 (unsure value), and ambulance was called. Daughter reports increasing confusion over the past week and has not known daughter or son-in-law's name. She states at times pt will not answer pt at all. Daughter dx with COVID on 7/2.   Daughter denies known cough or fever for patient. She states patient has been eating well. Patient is not on O2 at home. Patient takes aspirin daily. Most recent stroke was 2/2020. Daughter states patient's baseline is right-sided weakness, not able to walk and she knows her name and daughter's name. RaheemSterling with Dr. Alice Hamilton, consult for hospitalist.  Discussed patient's presentation, hyponatremia, UTI, PNA and concern for COVID. COVID swab sent. Treated with azithromycin and Rocephin. Dr. Alice Hamilton agreed to admit patient for further management. Disposition:  Admitted    PLAN:  1. Current Discharge Medication List        2. Follow-up Information    None       Return to ED if worse     Diagnosis     Clinical Impression:   1. Acute metabolic encephalopathy    2. Hypernatremia    3. Acute cystitis without hematuria    4. Community acquired pneumonia of right lung, unspecified part of lung    5. Suspected COVID-19 virus infection        Attestations:    MISSY Fulton    Please note that this dictation was completed with [email or web address], the computer voice recognition software. Quite often unanticipated grammatical, syntax, homophones, and other interpretive errors are inadvertently transcribed by the computer software. Please disregard these errors. Please excuse any errors that have escaped final proofreading. Thank you.

## 2020-07-28 NOTE — ED TRIAGE NOTES
Per EMS-\"Patient's O2 sats were in the 80's. We placed her on 4 L O2 and her sats came up to 95%. Patient is from home. \"

## 2020-07-28 NOTE — ED NOTES
Pt straight cath with #5fr fem cath for 7 ml of cloudy yellow urine sample to lab.  Pt tolerated procedure well PCXR obatined

## 2020-07-28 NOTE — ED NOTES
Pt returned from CT. AA;answers questions by shaking head yes/no. Answers appear to be appropriate. PERRL 3 mm. Deficits in ext movement unchaged. Skin normal color warm and dry Resp regular unlabored. O2 decreased to 2L NC prema 2 maintained 99%.

## 2020-07-28 NOTE — ED NOTES
1815 Lab called to report that there was not enough urine to complete study. [facility] ER tech attempted to obtain a # 18 IV to [facility] for CTA and was able to obtain 1 BC but no line. 1820 Pt straight cath with #5 fr fem cath for 9 ml of cloudy yellow urine  1830 Pt incont. of large amount of watery brown stool. Raquel care given. Pt with large stage 1 decub to sacral area . Sacral puffy pad placed after skin was cleaned. 1840 Attempted x2 made to obtain IV for CTA once by this RN and once by [facility] ER tech without success. ER provider aware and CTA will be canceled. ABx started after obtaining 1 BC erectile dysfunction provider aware.   1850 O2  Decreased to 1 LNC and SAO2 maintained at 98%

## 2020-07-28 NOTE — H&P
Medicine History and Physical    Patient: Ale Lopez   Age:  77 y.o. Assessment   Principal Problem:    Acute metabolic encephalopathy (8440)    Active Problems:    HTN (hypertension) (10/28/2014)      Hypothyroid (10/28/2014)      History of CVA (cerebrovascular accident) (2020)      Overview: r sided weakness per ED      CAP (community acquired pneumonia) (2020)      Close exposure to COVID-19 virus (2020)          Plan     1)  Acute metabolic encephalopathy   - likely 2/2 PNA +/- COVID   - unlikely UTI given UA findings   - IV abx as below, hypotonic fluids    2)  CAP   - Azithro rocephin, COVID r/o, dexamethasone    3)  Continue home meds for chronic conditions, hold any renal impairing medications, SSI for DM hx.    DISPO  -Pt to be admitted  at this time for reasons addressed above, continued hospitalization for ongoing assessment and treatment indicated     Anticipated Date of Discharge: 2-3 days  Anticipated Disposition (home, SNF) : home vs SNF    Chief Complaint: No chief complaint on file. HPI:   Ale Lopez is a 77y.o. year old female who presents with  AMS. Patient apparently is able to talk to family at baseline, knows who they are and is oriented but for the past week she has been confused. She did not recognize her family members. She has a family member that has tested + for covid. In ED found possible pna on xray with mild hypoxia.         Review of Systems -Unable to obtain 2/2 AMS    Past Medical History:  Past Medical History:   Diagnosis Date    Diabetes ([facility] [address]     Hypertension     Echo 6-25-15- borderline LVH, normal EF- CAV     Hypothyroid        Past Surgical History:  Past Surgical History:   Procedure Laterality Date    HX  SECTION          HX CHOLECYSTECTOMY      HX COLONOSCOPY      HX PARATHYROIDECTOMY      had 2 glands removed    HX THYROIDECTOMY         Family History:  Family History   Problem Relation Age of Onset    Hypertension Mother     Hypertension Brother     Hypertension Maternal Aunt     No Known Problems Father        Social History:  Social History     Socioeconomic History    Marital status:      Spouse name: Not on file    Number of children: Not on file    Years of education: Not on file    Highest education level: Not on file   Tobacco Use    Smoking status: Never Smoker    Smokeless tobacco: Never Used   Substance and Sexual Activity    Alcohol use: No     Alcohol/week: 0.0 standard drinks    Drug use: No       Home Medications:  Prior to Admission medications    Medication Sig Start Date End Date Taking? Authorizing Provider   traZODone (DESYREL) 50 mg tablet TAKE 1 TAB BY MOUTH NIGHTLY. 6/9/18   Rhea Lopez MD   amLODIPine (NORVASC) 10 mg tablet TAKE 1 TABLET BY MOUTH EVERY DAY 5/7/18   Rhea Lopez MD   KLOR-CON M20 20 mEq tablet TAKE 1 TABLET BY MOUTH TWICE A DAY 1/24/18   Rhea oLpez MD   pravastatin (PRAVACHOL) 10 mg tablet TAKE 1 TABLET BY MOUTH NIGHTLY 10/26/17   Rhea Lopez MD   losartan-hydroCHLOROthiazide (HYZAAR) 100-12.5 mg per tablet TAKE ONE TABLET BY MOUTH DAILY 10/23/17   Rhea oLpez MD   clotrimazole-betamethasone (LOTRISONE) topical cream APPLY TO AFFECTED AREA 2 TIMES A DAY 10/20/17   Rhea Lopez MD   glipiZIDE (GLUCOTROL) 5 mg tablet TAKE 1 TABLET BY MOUTH BEFORE BREAKFAST AND DINNER. 10/17/17   Rhea Lopez MD   metFORMIN (GLUCOPHAGE) 500 mg tablet TAKE 1 TABLET BY MOUTH TWO (2) TIMES DAILY (WITH MEALS). HOLD UNTIL Saturday 6/24 6/23/17   Warner Emery NP   aspirin delayed-release 81 mg tablet Take  by mouth daily.     Provider, Historical       Allergies:  No Known Allergies        Physical Exam:     Visit Vitals  /66 (BP 1 Location: Left arm, BP Patient Position: Supine)   Pulse 82   Temp 97.4 °F (36.3 °C)   Resp 19   SpO2 99%       Physical Exam:  General appearance: alert, no distress, appears stated age  Head: Normocephalic, without obvious abnormality, atraumatic  Neck: supple, trachea midline  Lungs: clear to auscultation bilaterally  Heart: regular rate and rhythm, S1, S2 normal, no murmur, click, rub or gallop  Abdomen: soft, non-tender. Bowel sounds normal. No masses,  no organomegaly  Extremities: extremities normal, atraumatic, no cyanosis or edema  Skin: Skin color, texture, turgor normal. No rashes or lesions  Neurologic: Patient will not follow commands, he is awake and alert, tracks me, non verbal    Intake and Output:  Current Shift:  No intake/output data recorded. Last three shifts:  No intake/output data recorded.     Lab/Data Reviewed:  CMP:   Lab Results   Component Value Date/Time     (H) 07/28/2020 03:08 PM    K 4.4 07/28/2020 03:08 PM     (H) 07/28/2020 03:08 PM    CO2 22 07/28/2020 03:08 PM    AGAP 6 07/28/2020 03:08 PM     (H) 07/28/2020 03:08 PM    BUN 42 (H) 07/28/2020 03:08 PM    CREA 1.12 07/28/2020 03:08 PM    GFRAA 59 (L) 07/28/2020 03:08 PM    GFRNA 49 (L) 07/28/2020 03:08 PM    CA 10.0 07/28/2020 03:08 PM    ALB 2.5 (L) 07/28/2020 03:08 PM    TP 7.0 07/28/2020 03:08 PM    GLOB 4.5 (H) 07/28/2020 03:08 PM    AGRAT 0.6 (L) 07/28/2020 03:08 PM    ALT 48 07/28/2020 03:08 PM     CBC:   Lab Results   Component Value Date/Time    WBC 13.9 (H) 07/28/2020 03:08 PM    HGB 12.1 07/28/2020 03:08 PM    HCT 38.2 07/28/2020 03:08 PM     07/28/2020 03:08 PM     All Cardiac Markers in the last 24 hours:   Lab Results   Component Value Date/Time     07/28/2020 03:08 PM    CKMB 3.1 07/28/2020 03:08 PM    CKND1 2.0 07/28/2020 03:08 PM    TROIQ <0.02 07/28/2020 03:08 PM     Peter De Anda MD    July 28, 2020

## 2020-07-29 LAB
ANION GAP SERPL CALC-SCNC: 4 MMOL/L (ref 3–18)
BUN SERPL-MCNC: 36 MG/DL (ref 7–18)
BUN/CREAT SERPL: 46 (ref 12–20)
CALCIUM SERPL-MCNC: 9.7 MG/DL (ref 8.5–10.1)
CHLORIDE SERPL-SCNC: 123 MMOL/L (ref 100–111)
CO2 SERPL-SCNC: 25 MMOL/L (ref 21–32)
CREAT SERPL-MCNC: 0.78 MG/DL (ref 0.6–1.3)
ERYTHROCYTE [DISTWIDTH] IN BLOOD BY AUTOMATED COUNT: 13.1 % (ref 11.6–14.5)
EST. AVERAGE GLUCOSE BLD GHB EST-MCNC: 128 MG/DL
GLUCOSE BLD STRIP.AUTO-MCNC: 106 MG/DL (ref 70–110)
GLUCOSE BLD STRIP.AUTO-MCNC: 115 MG/DL (ref 70–110)
GLUCOSE BLD STRIP.AUTO-MCNC: 147 MG/DL (ref 70–110)
GLUCOSE BLD STRIP.AUTO-MCNC: 150 MG/DL (ref 70–110)
GLUCOSE SERPL-MCNC: 166 MG/DL (ref 74–99)
HBA1C MFR BLD: 6.1 % (ref 4.2–5.6)
HCT VFR BLD AUTO: 35.3 % (ref 35–45)
HGB BLD-MCNC: 11 G/DL (ref 12–16)
MCH RBC QN AUTO: 31.1 PG (ref 24–34)
MCHC RBC AUTO-ENTMCNC: 31.2 G/DL (ref 31–37)
MCV RBC AUTO: 99.7 FL (ref 74–97)
PLATELET # BLD AUTO: 235 K/UL (ref 135–420)
PMV BLD AUTO: 11.1 FL (ref 9.2–11.8)
POTASSIUM SERPL-SCNC: 4.1 MMOL/L (ref 3.5–5.5)
RBC # BLD AUTO: 3.54 M/UL (ref 4.2–5.3)
SODIUM SERPL-SCNC: 152 MMOL/L (ref 136–145)
WBC # BLD AUTO: 13.2 K/UL (ref 4.6–13.2)

## 2020-07-29 PROCEDURE — 82962 GLUCOSE BLOOD TEST: CPT

## 2020-07-29 PROCEDURE — 74011250637 HC RX REV CODE- 250/637: Performed by: HOSPITALIST

## 2020-07-29 PROCEDURE — 51798 US URINE CAPACITY MEASURE: CPT

## 2020-07-29 PROCEDURE — 77010033678 HC OXYGEN DAILY

## 2020-07-29 PROCEDURE — 83036 HEMOGLOBIN GLYCOSYLATED A1C: CPT

## 2020-07-29 PROCEDURE — 92526 ORAL FUNCTION THERAPY: CPT

## 2020-07-29 PROCEDURE — 74011636637 HC RX REV CODE- 636/637: Performed by: HOSPITALIST

## 2020-07-29 PROCEDURE — 74011000258 HC RX REV CODE- 258: Performed by: HOSPITALIST

## 2020-07-29 PROCEDURE — 80048 BASIC METABOLIC PNL TOTAL CA: CPT

## 2020-07-29 PROCEDURE — 85027 COMPLETE CBC AUTOMATED: CPT

## 2020-07-29 PROCEDURE — 74011250636 HC RX REV CODE- 250/636: Performed by: HOSPITALIST

## 2020-07-29 PROCEDURE — 92610 EVALUATE SWALLOWING FUNCTION: CPT

## 2020-07-29 PROCEDURE — 65270000029 HC RM PRIVATE

## 2020-07-29 RX ORDER — INSULIN GLARGINE 100 [IU]/ML
10 INJECTION, SOLUTION SUBCUTANEOUS DAILY
Status: DISCONTINUED | OUTPATIENT
Start: 2020-07-29 | End: 2020-07-31

## 2020-07-29 RX ADMIN — AMLODIPINE BESYLATE 10 MG: 10 TABLET ORAL at 09:46

## 2020-07-29 RX ADMIN — DEXAMETHASONE 10 MG: 2 TABLET ORAL at 09:45

## 2020-07-29 RX ADMIN — ENOXAPARIN SODIUM 40 MG: 40 INJECTION SUBCUTANEOUS at 09:46

## 2020-07-29 RX ADMIN — AZITHROMYCIN MONOHYDRATE 250 MG: 500 INJECTION, POWDER, LYOPHILIZED, FOR SOLUTION INTRAVENOUS at 20:01

## 2020-07-29 RX ADMIN — INSULIN GLARGINE 10 UNITS: 100 INJECTION, SOLUTION SUBCUTANEOUS at 09:48

## 2020-07-29 RX ADMIN — PRAVASTATIN SODIUM 10 MG: 20 TABLET ORAL at 21:57

## 2020-07-29 RX ADMIN — SODIUM CHLORIDE 100 ML/HR: 450 INJECTION, SOLUTION INTRAVENOUS at 20:03

## 2020-07-29 RX ADMIN — CEFTRIAXONE 1 G: 1 INJECTION, POWDER, FOR SOLUTION INTRAMUSCULAR; INTRAVENOUS at 17:42

## 2020-07-29 RX ADMIN — ASPIRIN 81 MG: 81 TABLET, FILM COATED ORAL at 09:00

## 2020-07-29 RX ADMIN — Medication 10 ML: at 06:00

## 2020-07-29 RX ADMIN — SODIUM CHLORIDE 100 ML/HR: 450 INJECTION, SOLUTION INTRAVENOUS at 00:07

## 2020-07-29 RX ADMIN — Medication 10 ML: at 14:03

## 2020-07-29 NOTE — PROGRESS NOTES
0315> Assumed care from [address]. Patient arrived in the unit per stretcher accompanied by RN. Patient is wake and alert, non-verbal, nods appropriately. BUE and BLE withdraws to pain and are floppy. Sensations intact. VSS. Will continue to monitor    0730> Bedside and Verbal shift change report given to [facility] AT [address] (oncoming nurse) by AVELINA boland (offgoing nurse). Report included the following information SBAR, Kardex, Intake/Output, MAR and Recent Results . 0740> Bedside dysphagia screening completed. See doc flow sheet.

## 2020-07-29 NOTE — PROGRESS NOTES
0730:  Bedside and Verbal shift change report given to Jolie Barajas RN (oncoming nurse) by aNz Estrada (offgoing nurse). Report included the following information SBAR, MAR, Recent Results, Med Rec Status, Cardiac Rhythm S-Rhythm w/ ST Depression and Alarm Parameters . 1030:  Dr. Zoë Brown at bedside. 1200:  Bedside and Verbal shift change report given to AVELINA Garcia (oncoming nurse) by Jolie Barajas RN (offgoing nurse). Report included the following information SBAR, Intake/Output, MAR, Recent Results, Med Rec Status, Cardiac Rhythm S-rhythm w/ st depression and Alarm Parameters .

## 2020-07-29 NOTE — PROGRESS NOTES
Problem: Falls - Risk of  Goal: *Absence of Falls  Description: Document Sami Lopez Fall Risk and appropriate interventions in the flowsheet. Outcome: Progressing Towards Goal  Note: Fall Risk Interventions:       Mentation Interventions: Bed/chair exit alarm, Adequate sleep, hydration, pain control, More frequent rounding    Medication Interventions: Bed/chair exit alarm, Evaluate medications/consider consulting pharmacy    Elimination Interventions: Bed/chair exit alarm, Call light in reach, Patient to call for help with toileting needs, Toileting schedule/hourly rounds              Problem: Patient Education: Go to Patient Education Activity  Goal: Patient/Family Education  Outcome: Progressing Towards Goal     Problem: Pressure Injury - Risk of  Goal: *Prevention of pressure injury  Description: Document Daniel Scale and appropriate interventions in the flowsheet. Outcome: Progressing Towards Goal  Note: Pressure Injury Interventions:  Sensory Interventions: Assess changes in LOC, Avoid rigorous massage over bony prominences, Keep linens dry and wrinkle-free    Moisture Interventions: Absorbent underpads, Internal/External urinary devices, Check for incontinence Q2 hours and as needed    Activity Interventions: Assess need for specialty bed, Pressure redistribution bed/mattress(bed type), PT/OT evaluation    Mobility Interventions: HOB 30 degrees or less, Pressure redistribution bed/mattress (bed type), Turn and reposition approx.  every two hours(pillow and wedges)    Nutrition Interventions: Document food/fluid/supplement intake    Friction and Shear Interventions: Foam dressings/transparent film/skin sealants, HOB 30 degrees or less                Problem: Patient Education: Go to Patient Education Activity  Goal: Patient/Family Education  Outcome: Progressing Towards Goal     Problem: Aspiration - Risk of  Goal: *Absence of aspiration  Outcome: Progressing Towards Goal     Problem: Patient Education: Go to Patient Education Activity  Goal: Patient/Family Education  Outcome: Progressing Towards Goal     Problem: Hypertension  Goal: *Blood pressure within specified parameters  Outcome: Progressing Towards Goal  Goal: *Fluid volume balance  Outcome: Progressing Towards Goal  Goal: *Labs within defined limits  Outcome: Progressing Towards Goal     Problem: Patient Education: Go to Patient Education Activity  Goal: Patient/Family Education  Outcome: Progressing Towards Goal     Problem: Chronic Obstructive Pulmonary Disease (COPD)  Goal: *Oxygen saturation during activity within specified parameters  Outcome: Progressing Towards Goal  Goal: *Able to remain out of bed as prescribed  Outcome: Progressing Towards Goal  Goal: *Absence of hypoxia  Outcome: Progressing Towards Goal  Goal: *Optimize nutritional status  Outcome: Progressing Towards Goal     Problem: Patient Education: Go to Patient Education Activity  Goal: Patient/Family Education  Outcome: Progressing Towards Goal

## 2020-07-29 NOTE — PROGRESS NOTES
Problem: Discharge Planning  Goal: *Discharge to safe environment  Outcome: Resolved/Met   Plan home    Reason for Admission:   Met encephalopathy                   RUR Score: 11%                    Plan for utilizing home health:    poss      PCP: First and Last name:     Name of Practice:    Are you a current patient: Yes/No:    Approximate date of last visit:    Can you participate in a virtual visit with your PCP:                     Current Advanced Directive/Advance Care Plan:                          Transition of Care Plan:    Unable to interview pt. No answer when call into room. Called brother listed on face sheet as contact. Left message. Patient has designated ________________________ to participate in his/her discharge plan and to receive any needed information.      Name:   Address:  Phone number:    Care Management Interventions  PCP Verified by CM: No  Palliative Care Criteria Met (RRAT>21 & CHF Dx)?: No  The Patient and/or Patient Representative was Provided with a Choice of Provider and Agrees with the Discharge Plan?: No  Freedom of Choice List was Provided with Basic Dialogue that Supports the Patient's Individualized Plan of Care/Goals, Treatment Preferences and Shares the Quality Data Associated with the Providers?: No  Discharge Location  Discharge Placement: Home

## 2020-07-29 NOTE — PROGRESS NOTES
lab unable to draw bmp ordered for 1500. Will try again dr merritt gonzalez aware. 1600 cta unable to be done, needs better iv access. 1935 Bedside and Verbal shift change report given to avelino cisneros (oncoming nurse) by Chhaya French RN   (offgoing nurse). Report included the following information Kardex, MAR and Recent Results.

## 2020-07-29 NOTE — PROGRESS NOTES
Problem: Falls - Risk of  Goal: *Absence of Falls  Description: Document Rebbecca Persons Fall Risk and appropriate interventions in the flowsheet. Outcome: Progressing Towards Goal  Note: Fall Risk Interventions:       Mentation Interventions: Bed/chair exit alarm, Adequate sleep, hydration, pain control, More frequent rounding    Medication Interventions: Bed/chair exit alarm, Evaluate medications/consider consulting pharmacy    Elimination Interventions: Bed/chair exit alarm, Call light in reach, Patient to call for help with toileting needs, Toileting schedule/hourly rounds              Problem: Pressure Injury - Risk of  Goal: *Prevention of pressure injury  Description: Document Daniel Scale and appropriate interventions in the flowsheet. Outcome: Progressing Towards Goal  Note: Pressure Injury Interventions:  Sensory Interventions: Assess changes in LOC, Avoid rigorous massage over bony prominences, Keep linens dry and wrinkle-free    Moisture Interventions: Absorbent underpads, Internal/External urinary devices, Check for incontinence Q2 hours and as needed    Activity Interventions: Assess need for specialty bed, Pressure redistribution bed/mattress(bed type), PT/OT evaluation    Mobility Interventions: HOB 30 degrees or less, Pressure redistribution bed/mattress (bed type), Turn and reposition approx.  every two hours(pillow and wedges)    Nutrition Interventions: Document food/fluid/supplement intake    Friction and Shear Interventions: Foam dressings/transparent film/skin sealants, HOB 30 degrees or less

## 2020-07-29 NOTE — PROGRESS NOTES
Progress Note      Patient: Fernando Conway               Sex: female          DOA: 7/28/2020       YOB: 1955      Age:  77 y.o.        LOS:  LOS: 1 day             CHIEF COMPLAINT:  Pneumonia, metabolic encephalopathy    Subjective:     Patient is awake and talking  She has come confusion, but is speaking clearly    Objective:      Visit Vitals  /74   Pulse 62   Temp 97.6 °F (36.4 °C)   Resp 14   Ht 5' 3\" (1.6 m)   Wt 47.9 kg (105 lb 11.2 oz)   SpO2 98%   BMI 18.72 kg/m²       Physical Exam:  Gen:  Patient is in no distress. No complaint  HEENT and Neck:  PERRL, No cervical tenderness or masses  Lungs:  Clear bilaterally. No rales, no wheeze. Normal effort. Heart:  Regular Rate and Rhythm. No murmur or gallop. No JVD. No edema.   Abdomen:  Soft, non tender, no masses, no Hepatosplenomegally  Extremities:  No digital clubbing, no cyanosis  Neuro:  Alert and oriented, Normal affect, Cranial nerves intact, No sensory deficits        Lab/Data Reviewed:  BMP:   Lab Results   Component Value Date/Time     (H) 07/29/2020 05:02 AM    K 4.1 07/29/2020 05:02 AM     (H) 07/29/2020 05:02 AM    CO2 25 07/29/2020 05:02 AM    AGAP 4 07/29/2020 05:02 AM     (H) 07/29/2020 05:02 AM    BUN 36 (H) 07/29/2020 05:02 AM    CREA 0.78 07/29/2020 05:02 AM    GFRAA >60 07/29/2020 05:02 AM    GFRNA >60 07/29/2020 05:02 AM     CBC:   Lab Results   Component Value Date/Time    WBC 13.2 07/29/2020 05:02 AM    HGB 11.0 (L) 07/29/2020 05:02 AM    HCT 35.3 07/29/2020 05:02 AM     07/29/2020 05:02 AM           Assessment/Plan     Principal Problem:    Acute metabolic encephalopathy (7/64/1554)    Active Problems:    HTN (hypertension) (10/28/2014)      Hypothyroid (10/28/2014)      History of CVA (cerebrovascular accident) (7/28/2020)      Overview: r sided weakness per ED      CAP (community acquired pneumonia) (7/28/2020)      Close exposure to COVID-19 virus (7/28/2020)    Hypernatremia    Plan:  Continue with IV antibiotics  Follow Mental Status, I suspect she is improving  BP control  Follow respiratory status  Droplet Plus precautions  Follow renal function, BMP at 1500 to assess Sodium  DVT prophylaxis.

## 2020-07-29 NOTE — DIABETES MGMT
Nutrition Assessment/Glycemic Control    Type and Reason for Visit:  Initial    Nutrition Recommendations/Plan:   Recommend adding Lantus 10 units q 24 hrs. SLP recommends full liquid diet with nectar thick liquids. Will add Ensure Enlive BID to provide additional calories and protein. Will verify weight as current weight is documented as  42 lbs below 2/2020 wt and 58 lbs below 8/2019 weight. Nutrition Assessment:    Dx; Acute metabolic encephalopathy    Active Problems:  HTN, Hypothyroid, History of CVA,  r sided weakness, CAP , DM  Malnutrition Assessment:  Malnutrition Status: Moderate   Energy Intake:   <75% requirements for >1 month  Weight Loss:      If weights are accurate, Pt's weight is 28% below  her 2/2020 weight. Diabetes Management:   Recent blood glucose:      Lab Results   Component Value Date/Time     (H) 07/29/2020 05:02 AM    GLUCPOC 150 (H) 07/29/2020 12:15 PM    GLUCPOC 147 (H) 07/29/2020 08:40 AM   Within target range (non-ICU: <140; ICU<180):  Yes      Current Insulin regimen: 10 units Lantus with corrective lispro  Home medication/insulin regimen:  Glipizide and metformin  HbA1c: equivalent  to ave Blood Glucose of   mg/dl for 2-3 months prior to admission   Adequate glycemic control PTA:   Current A1c pending          Estimated Daily Nutrient Needs:  Energy (kcal):  1570  Protein (g):  68       Fluid (ml/day):  1800  Nutrition Related Findings:     Pt is underweight   AEB 71% of her ideal weight with BMI=18.7kg/m2.   Wounds:  Noted per RN      Current Nutrition Therapies:   DIET FULL LIQUID 2 [place]/2 Mildly Thick    Anthropometric Measures:  · Height:  5' 3\" (160 cm)  · Current Body Wt:  47.6 kg (105 lb)   · Admission Body Wt:  104 lb 15 oz    · Usual Body Wt:  147 lb(2/2020)     · Ideal Body Wt:   :    115 lbs  · BMI:     18.7 g/m2  · BMI Categories:  Underweight (BMI less than 22) age over 72     Wt from EMR 8/2019 163 lbs , 2/2020 147 lbs   Nutrition Diagnosis: Inadequate protein-energy intake related to swallowing difficulty as evidenced by minimal intake today (sips) per RN. Nutrition Interventions:   Food and/or Nutrient Delivery:   Advance diet per SLP   Goals:      Intake will meet >75% of energy and protein requirements. Glucose will be within target range.   Continue Nutrition Monitoring and Evaluation   Discharge Planning:  n/a        Electronically signed by Valdemar Aguayo RD on 7/29/2020 at 3:40 PM

## 2020-07-29 NOTE — PROGRESS NOTES
conducted an initial consultation and Spiritual Assessment for Chantelle Perez, who is a 77 y.o.,female. Patients Primary Language is: [place]. According to the patients EMR Hinduism Affiliation is: [facility].     The reason the Patient came to the hospital is:   Patient Active Problem List    Diagnosis Date Noted    Acute metabolic encephalopathy 64/82/8121    History of CVA (cerebrovascular accident) 07/28/2020    CAP (community acquired pneumonia) 07/28/2020    Close exposure to COVID-19 virus 07/28/2020    Adjustment insomnia 08/29/2017   [facility] discharge follow-up 06/27/2017    Balance problem 06/27/2017    Weakness due to cerebrovascular accident (CVA) 06/22/2017    Gait abnormality 06/22/2017    Dysuria 11/28/2016    Osteoarthritis of left patellofemoral joint 11/07/2016    Primary osteoarthritis of first carpometacarpal joint of left hand 11/07/2016    Hyperlipidemia with target LDL less than 100 08/26/2015    Abnormal finding on EKG 05/27/2015    Hypothyroidism, postsurgical 05/27/2015    Candidiasis, intertrigo 11/26/2014    Hypokalemia 10/29/2014    CKD (chronic kidney disease) stage 3, GFR 30-59 ml/min ([facility]) 10/29/2014    HTN (hypertension) 10/28/2014    Hypothyroid 10/28/2014    Diabetes mellitus type 2, uncomplicated ([facility] [address] 31/74/7298    Otitis media, chronic, suppurative 10/28/2014        The  provided the following Interventions:   attempted to Initiate a relationship of care and support with patient in room 2707 this morning but found patient resting peacefully at this time and also in an isolation room due to possible droplet plus precautions. The normal visit that would generally be done did not happen at this time. Provided information about Spiritual Care Services. Offered prayer and assurance of continued prayers on patients behalf.      Assessment:  Patient does not have any Congregation/cultural needs that will affect patients preferences in health care. There are no further spiritual or Rastafari issues which require Spiritual Care Services interventions at this time. Plan:  Chaplains will continue to follow and will provide pastoral care on an as needed/requested basis    . Jerri Lau   Spiritual Care   [phone]

## 2020-07-30 PROBLEM — I47.1 PSVT (PAROXYSMAL SUPRAVENTRICULAR TACHYCARDIA) (HCC): Status: ACTIVE | Noted: 2020-07-30

## 2020-07-30 LAB
ANION GAP SERPL CALC-SCNC: 4 MMOL/L (ref 3–18)
ANION GAP SERPL CALC-SCNC: 6 MMOL/L (ref 3–18)
BASOPHILS # BLD: 0 K/UL (ref 0–0.1)
BASOPHILS # BLD: 0 K/UL (ref 0–0.1)
BASOPHILS NFR BLD: 0 % (ref 0–2)
BASOPHILS NFR BLD: 0 % (ref 0–2)
BUN SERPL-MCNC: 24 MG/DL (ref 7–18)
BUN SERPL-MCNC: 27 MG/DL (ref 7–18)
BUN/CREAT SERPL: 42 (ref 12–20)
BUN/CREAT SERPL: 52 (ref 12–20)
CALCIUM SERPL-MCNC: 10.4 MG/DL (ref 8.5–10.1)
CALCIUM SERPL-MCNC: 9.7 MG/DL (ref 8.5–10.1)
CHLORIDE SERPL-SCNC: 117 MMOL/L (ref 100–111)
CHLORIDE SERPL-SCNC: 123 MMOL/L (ref 100–111)
CO2 SERPL-SCNC: 21 MMOL/L (ref 21–32)
CO2 SERPL-SCNC: 23 MMOL/L (ref 21–32)
CREAT SERPL-MCNC: 0.52 MG/DL (ref 0.6–1.3)
CREAT SERPL-MCNC: 0.57 MG/DL (ref 0.6–1.3)
DIFFERENTIAL METHOD BLD: ABNORMAL
DIFFERENTIAL METHOD BLD: ABNORMAL
EOSINOPHIL # BLD: 0 K/UL (ref 0–0.4)
EOSINOPHIL # BLD: 0 K/UL (ref 0–0.4)
EOSINOPHIL NFR BLD: 0 % (ref 0–5)
EOSINOPHIL NFR BLD: 0 % (ref 0–5)
ERYTHROCYTE [DISTWIDTH] IN BLOOD BY AUTOMATED COUNT: 12.5 % (ref 11.6–14.5)
ERYTHROCYTE [DISTWIDTH] IN BLOOD BY AUTOMATED COUNT: 12.7 % (ref 11.6–14.5)
GLUCOSE BLD STRIP.AUTO-MCNC: 106 MG/DL (ref 70–110)
GLUCOSE BLD STRIP.AUTO-MCNC: 157 MG/DL (ref 70–110)
GLUCOSE BLD STRIP.AUTO-MCNC: 159 MG/DL (ref 70–110)
GLUCOSE SERPL-MCNC: 116 MG/DL (ref 74–99)
GLUCOSE SERPL-MCNC: 150 MG/DL (ref 74–99)
HCT VFR BLD AUTO: 34.3 % (ref 35–45)
HCT VFR BLD AUTO: 39.7 % (ref 35–45)
HGB BLD-MCNC: 11 G/DL (ref 12–16)
HGB BLD-MCNC: 12.9 G/DL (ref 12–16)
LYMPHOCYTES # BLD: 1.1 K/UL (ref 0.9–3.6)
LYMPHOCYTES # BLD: 1.3 K/UL (ref 0.9–3.6)
LYMPHOCYTES NFR BLD: 15 % (ref 21–52)
LYMPHOCYTES NFR BLD: 17 % (ref 21–52)
MAGNESIUM SERPL-MCNC: 2.3 MG/DL (ref 1.6–2.6)
MCH RBC QN AUTO: 31.3 PG (ref 24–34)
MCH RBC QN AUTO: 31.3 PG (ref 24–34)
MCHC RBC AUTO-ENTMCNC: 32.1 G/DL (ref 31–37)
MCHC RBC AUTO-ENTMCNC: 32.5 G/DL (ref 31–37)
MCV RBC AUTO: 96.4 FL (ref 74–97)
MCV RBC AUTO: 97.4 FL (ref 74–97)
MONOCYTES # BLD: 0.1 K/UL (ref 0.05–1.2)
MONOCYTES # BLD: 0.1 K/UL (ref 0.05–1.2)
MONOCYTES NFR BLD: 2 % (ref 3–10)
MONOCYTES NFR BLD: 2 % (ref 3–10)
NEUTS SEG # BLD: 6.1 K/UL (ref 1.8–8)
NEUTS SEG # BLD: 6.3 K/UL (ref 1.8–8)
NEUTS SEG NFR BLD: 81 % (ref 40–73)
NEUTS SEG NFR BLD: 83 % (ref 40–73)
PHOSPHATE SERPL-MCNC: 2.2 MG/DL (ref 2.5–4.9)
PLATELET # BLD AUTO: 248 K/UL (ref 135–420)
PLATELET # BLD AUTO: 249 K/UL (ref 135–420)
PMV BLD AUTO: 11.1 FL (ref 9.2–11.8)
PMV BLD AUTO: 12.4 FL (ref 9.2–11.8)
POTASSIUM SERPL-SCNC: 3.7 MMOL/L (ref 3.5–5.5)
POTASSIUM SERPL-SCNC: 3.7 MMOL/L (ref 3.5–5.5)
RBC # BLD AUTO: 3.52 M/UL (ref 4.2–5.3)
RBC # BLD AUTO: 4.12 M/UL (ref 4.2–5.3)
SODIUM SERPL-SCNC: 144 MMOL/L (ref 136–145)
SODIUM SERPL-SCNC: 150 MMOL/L (ref 136–145)
TROPONIN I SERPL-MCNC: 0.04 NG/ML (ref 0–0.04)
TROPONIN I SERPL-MCNC: <0.02 NG/ML (ref 0–0.04)
WBC # BLD AUTO: 7.5 K/UL (ref 4.6–13.2)
WBC # BLD AUTO: 7.5 K/UL (ref 4.6–13.2)

## 2020-07-30 PROCEDURE — 74011250637 HC RX REV CODE- 250/637: Performed by: HOSPITALIST

## 2020-07-30 PROCEDURE — 74011250636 HC RX REV CODE- 250/636: Performed by: HOSPITALIST

## 2020-07-30 PROCEDURE — 74011000250 HC RX REV CODE- 250: Performed by: INTERNAL MEDICINE

## 2020-07-30 PROCEDURE — 84100 ASSAY OF PHOSPHORUS: CPT

## 2020-07-30 PROCEDURE — 85025 COMPLETE CBC W/AUTO DIFF WBC: CPT

## 2020-07-30 PROCEDURE — 74011636637 HC RX REV CODE- 636/637: Performed by: HOSPITALIST

## 2020-07-30 PROCEDURE — 74011250637 HC RX REV CODE- 250/637

## 2020-07-30 PROCEDURE — 80048 BASIC METABOLIC PNL TOTAL CA: CPT

## 2020-07-30 PROCEDURE — 74011000250 HC RX REV CODE- 250

## 2020-07-30 PROCEDURE — 87040 BLOOD CULTURE FOR BACTERIA: CPT

## 2020-07-30 PROCEDURE — 82962 GLUCOSE BLOOD TEST: CPT

## 2020-07-30 PROCEDURE — 74011250636 HC RX REV CODE- 250/636

## 2020-07-30 PROCEDURE — 93005 ELECTROCARDIOGRAM TRACING: CPT

## 2020-07-30 PROCEDURE — 83735 ASSAY OF MAGNESIUM: CPT

## 2020-07-30 PROCEDURE — 65660000000 HC RM CCU STEPDOWN

## 2020-07-30 PROCEDURE — 74011000258 HC RX REV CODE- 258: Performed by: HOSPITALIST

## 2020-07-30 PROCEDURE — 84484 ASSAY OF TROPONIN QUANT: CPT

## 2020-07-30 PROCEDURE — 74011000250 HC RX REV CODE- 250: Performed by: HOSPITALIST

## 2020-07-30 RX ORDER — MORPHINE SULFATE 2 MG/ML
2 INJECTION, SOLUTION INTRAMUSCULAR; INTRAVENOUS ONCE
Status: COMPLETED | OUTPATIENT
Start: 2020-07-30 | End: 2020-07-30

## 2020-07-30 RX ORDER — MORPHINE SULFATE 2 MG/ML
INJECTION, SOLUTION INTRAMUSCULAR; INTRAVENOUS
Status: COMPLETED
Start: 2020-07-30 | End: 2020-07-30

## 2020-07-30 RX ORDER — DILTIAZEM HYDROCHLORIDE 5 MG/ML
10 INJECTION INTRAVENOUS ONCE
Status: COMPLETED | OUTPATIENT
Start: 2020-07-30 | End: 2020-07-30

## 2020-07-30 RX ORDER — METOPROLOL TARTRATE 5 MG/5ML
2.5 INJECTION INTRAVENOUS EVERY 6 HOURS
Status: DISCONTINUED | OUTPATIENT
Start: 2020-07-30 | End: 2020-08-02

## 2020-07-30 RX ORDER — METOPROLOL TARTRATE 5 MG/5ML
2.5 INJECTION INTRAVENOUS EVERY 6 HOURS
Status: DISCONTINUED | OUTPATIENT
Start: 2020-07-30 | End: 2020-07-30

## 2020-07-30 RX ORDER — NITROGLYCERIN 0.4 MG/1
TABLET SUBLINGUAL
Status: COMPLETED
Start: 2020-07-30 | End: 2020-07-30

## 2020-07-30 RX ORDER — METOPROLOL TARTRATE 5 MG/5ML
INJECTION INTRAVENOUS
Status: DISPENSED
Start: 2020-07-30 | End: 2020-07-31

## 2020-07-30 RX ORDER — DILTIAZEM HYDROCHLORIDE 5 MG/ML
INJECTION INTRAVENOUS
Status: COMPLETED
Start: 2020-07-30 | End: 2020-07-30

## 2020-07-30 RX ORDER — NITROGLYCERIN 0.4 MG/1
0.4 TABLET SUBLINGUAL AS NEEDED
Status: DISCONTINUED | OUTPATIENT
Start: 2020-07-30 | End: 2020-08-04 | Stop reason: HOSPADM

## 2020-07-30 RX ADMIN — INSULIN LISPRO 2 UNITS: 100 INJECTION, SOLUTION INTRAVENOUS; SUBCUTANEOUS at 09:22

## 2020-07-30 RX ADMIN — MORPHINE SULFATE 2 MG: 2 INJECTION, SOLUTION INTRAMUSCULAR; INTRAVENOUS at 15:24

## 2020-07-30 RX ADMIN — DEXAMETHASONE 10 MG: 2 TABLET ORAL at 08:41

## 2020-07-30 RX ADMIN — Medication 10 ML: at 22:16

## 2020-07-30 RX ADMIN — CEFTRIAXONE 1 G: 1 INJECTION, POWDER, FOR SOLUTION INTRAMUSCULAR; INTRAVENOUS at 17:53

## 2020-07-30 RX ADMIN — MORPHINE SULFATE 2 MG: 2 INJECTION, SOLUTION INTRAMUSCULAR; INTRAVENOUS at 15:10

## 2020-07-30 RX ADMIN — MORPHINE SULFATE 2 MG: 2 INJECTION, SOLUTION INTRAMUSCULAR; INTRAVENOUS at 16:08

## 2020-07-30 RX ADMIN — ASPIRIN 81 MG: 81 TABLET, FILM COATED ORAL at 09:00

## 2020-07-30 RX ADMIN — DILTIAZEM HYDROCHLORIDE 10 MG: 5 INJECTION INTRAVENOUS at 15:33

## 2020-07-30 RX ADMIN — INSULIN GLARGINE 10 UNITS: 100 INJECTION, SOLUTION SUBCUTANEOUS at 08:41

## 2020-07-30 RX ADMIN — NITROGLYCERIN 0.4 MG: 0.4 TABLET, ORALLY DISINTEGRATING SUBLINGUAL at 15:15

## 2020-07-30 RX ADMIN — SODIUM CHLORIDE 100 ML/HR: 450 INJECTION, SOLUTION INTRAVENOUS at 08:44

## 2020-07-30 RX ADMIN — DILTIAZEM HYDROCHLORIDE 10 MG: 5 INJECTION INTRAVENOUS at 15:10

## 2020-07-30 RX ADMIN — AZITHROMYCIN MONOHYDRATE 250 MG: 500 INJECTION, POWDER, LYOPHILIZED, FOR SOLUTION INTRAVENOUS at 20:30

## 2020-07-30 RX ADMIN — Medication 10 ML: at 15:50

## 2020-07-30 RX ADMIN — AMLODIPINE BESYLATE 10 MG: 10 TABLET ORAL at 08:42

## 2020-07-30 RX ADMIN — PRAVASTATIN SODIUM 10 MG: 20 TABLET ORAL at 22:16

## 2020-07-30 RX ADMIN — METOPROLOL TARTRATE 2.5 MG: 5 INJECTION INTRAVENOUS at 16:21

## 2020-07-30 RX ADMIN — SODIUM CHLORIDE 100 ML/HR: 450 INJECTION, SOLUTION INTRAVENOUS at 23:35

## 2020-07-30 RX ADMIN — INSULIN LISPRO 2 UNITS: 100 INJECTION, SOLUTION INTRAVENOUS; SUBCUTANEOUS at 12:30

## 2020-07-30 RX ADMIN — ENOXAPARIN SODIUM 40 MG: 40 INJECTION SUBCUTANEOUS at 08:40

## 2020-07-30 RX ADMIN — NITROGLYCERIN 0.4 MG: 0.4 TABLET SUBLINGUAL at 15:15

## 2020-07-30 RX ADMIN — METOPROLOL TARTRATE 2.5 MG: 5 INJECTION INTRAVENOUS at 22:16

## 2020-07-30 NOTE — ACP (ADVANCE CARE PLANNING)
Advance Care Planning     Advance Care Planning Clinical Specialist  Conversation Note      Date of ACP Conversation: 7/30/2020    [facility] Conducted with:   Patient with [address]: Next of Kin by law (only applies in absence of above) (name) Geovanny Arnold (dtr, pt lives with)    ACP Clinical Specialist: Malka Ryan Decision Maker makes decisions on behalf of the incapacitated patient: Decision Maker is asked to consider and make decisions based on patient values, known preferences, or best interests. Health Care Decision Maker:    Current Designated Health Care Decision Maker:   (If there is a valid [place] named in the [employer]\" box in the ACP activity, but it is not visible above, be sure to open that field and then select the health care decision maker relationship (ie \"primary\") in the blank space to the right of the name.) Validate  this information as still accurate & up-to-date; edit [place] field as needed.)    Note: Assess and validate information in current ACP documents, as indicated. If no Decision Maker listed above or available through scanned documents, then:    If no Authorized Decision Maker has previously been identified, then patient chooses [place]:  \"Who would you like to name as your primary health care decision-maker? \"    Name: Geovanny Arnold   Relationship: dtr Phone number: [phone]  \"Can this person be reached easily? \" YES  \"Who would you like to name as your back-up decision maker? \"   Name: Ciara Little  Relationship: son Phone number: [phone]  Liane Oneida this person be reached easily? \" YES    Note: If the relationship of these Decision-Makers to the patient does NOT follow your state's Next of Kin hierarchy, recommend that patient complete ACP document that meets state-specific requirements to allow them to act on the patient's behalf when appropriate. Care Preferences    Hospitalization: \"If your health worsens and it becomes clear that your chance of recovery is unlikely, what would your preference be regarding hospitalization? \"    Choice:  []  The patient wants hospitalization  []  The patient prefers comfort-focused treatment without hospitalization. Ventilation: \"If you were in your present state of health and suddenly became very ill and were unable to breathe on your own, what would your preference be about the use of a ventilator (breathing machine) if it were available to you? \"      If patient would desire the use of a ventilator (breathing machine), answer \"yes\", if not \"no\":yes    \"If your health worsens and it becomes clear that your chance of recovery is unlikely, what would your preference be about the use of a ventilator (breathing machine) if it were available to you? \"     If patient would desire the use of a ventilator (breathing machine), answer \"yes\", if not \"no\"NO      Resuscitation  \"CPR works best to restart the heart when there is a sudden event, like a heart attack, in someone who is otherwise healthy. Unfortunately, CPR does not typically restart the heart for people who have serious health conditions or who are very sick. \"    \"In the event your heart stopped as a result of an underlying serious health condition, would you want attempts to be made to restart your heart (answer \"yes\" for attempt to resuscitate) or would you prefer a natural death (answer \"no\" for do not attempt to resuscitate)? \" yes      NOTE: If the patient has a valid advance directive AND now provides care preference(s) that are inconsistent with that prior directive, advise the patient to consider either: creating a new advance directive that complies with state-specific requirements; or, if that is not possible, orally revoking that prior directive in accordance with state-specific requirements, which must be documented in the EHR.     [] Yes  [] No Educated Patient / Clarita Choi regarding differences between Advance Directives and portable DNR orders.     Length of ACP Conversation in minutes:      Conversation Outcomes:  [x] ACP discussion completed  [] Existing advance directive reviewed with patient; no changes to patient's previously recorded wishes   [] New Advance Directive completed   [] Portable Do Not Rescitate prepared for Provider review and signature  [] POLST/POST/MOLST/MOST prepared for Provider review and signature      Follow-up plan:    [] Schedule follow-up conversation to continue planning  [] Referred individual to Provider for additional questions/concerns   [] Advised patient/agent/surrogate to review completed ACP document and update if needed with changes in condition, patient preferences or care setting     [x] This note routed to one or more involved healthcare providers

## 2020-07-30 NOTE — PROGRESS NOTES
Problem: Dysphagia (Adult)  Goal: *Acute Goals and Plan of Care (Insert Text)  Description: Recommendations:  Diet: full liquids/nectar-thick  Meds: via IV  Aspiration Precautions  Oral Care TID    Goals:  Patient will:  1. Tolerate diet and/or diet upgrade without overt s/sx of aspiration under SLP supervision  2. Utilize compensatory swallow strategies of small bite/sip, alternate liquid/solid with min cues   3. Perform oral-motor and laryngeal elevation exercises 10 reps/each to increase oropharyngeal swallow function with min cues  4. Complete an objective swallow study (i.e., MBSS) to assess swallow integrity, r/o aspiration, and determine of safest LRD, min A      Outcome: Progressing Towards Goal      SPEECH LANGUAGE PATHOLOGY BEDSIDE SWALLOW   EVALUATION & TREATMENT     Patient: Gerson Godwin (48 y.o. female)  Date: 7/29/2020  Primary Diagnosis: Acute metabolic encephalopathy [J59.67]        Precautions: aspiration      PLOF: unable to determine    ASSESSMENT :  Based on the objective data described below, the patient presents with moderately-severe oropharyngeal dysphagia in the setting of AMS. Pt difficult to arouse; eventually alerted with noxious stim. Pt unable to verbalize; able to nod head yes/no but not contextually. Accepted straw and cup sips of thin liquid with immediate throat clears. Attempted to monitor sip size with thin; however, pt with difficulty accepting liquids. Attempted via tsp; throat clears continued. Aspiration s/s resolved with cup sips of nectar-thick liquids. Pt required significant encouragement to accept pudding/puree/applesauce trials. When finally accepted, pt held bolus across 3/3 trials requiring SLP to remove from oral cavity. After ensuring oral cavity clear of puree, pt accepted ~2 oz nectar-thick liquids via cup sips; no aspiration s/s. It should be noted that the pt with delay in swallow and requiring multiple swallows to clear.  At this time, pt safe for sips of nectar-thick liquids only; meds via IV. SLP will continue to follow as indicated. D/w RN, Sherrod Hamman and RD. TREATMENT :  Skilled therapy initiated; Educated pt on aspiration precautions and importance of compensatory swallow techniques to decrease aspiration risk (decrease rate of intake & sip/bite size, upright @HOB for all po intake and ~30 minutes after po); unable to verbalize comprehension. SLP to follow as indicated. Patient will benefit from skilled intervention to address the above impairments. Patient's rehabilitation potential is considered to be Guarded  Factors which may influence rehabilitation potential include:   []            None noted  [x]            Mental ability/status  [x]            Medical condition  []            Home/family situation and support systems  []            Safety awareness  []            Pain tolerance/management  []            Other:      PLAN :  Recommendations and Planned Interventions:  Sips of nectar-thick liquids  Frequency/Duration: Patient will be followed by speech-language pathology 1-2 times per day/3-5 days per week to address goals. Discharge Recommendations: Skilled Nursing Facility     SUBJECTIVE:   Patient stated nonverbal.     OBJECTIVE:     Past Medical History:   Diagnosis Date    Diabetes ([facility] [address]     Hypertension     Echo 6-25-15- borderline LVH, normal EF- CAV     Hypothyroid      Past Surgical History:   Procedure Laterality Date    HX  SECTION          HX CHOLECYSTECTOMY      HX COLONOSCOPY      HX PARATHYROIDECTOMY      had 2 glands removed    HX THYROIDECTOMY       Home Situation:   Home Situation  Home Environment: Private residence  One/Two Story Residence: One story  Living Alone: No  Support Systems: Child(roverto)  Patient Expects to be Discharged to[de-identified] Private residence  Current DME Used/Available at Home: None    Diet prior to admission: unable to determine  Current Diet:  sips of nectar-thick liquids     Cognitive and Communication Status:  Neurologic State: Eyes open spontaneously  Orientation Level: Unable to verbalize  Cognition: Unable to assess (comment)           Oral Assessment:   See above    P.O. Trials:   See above    Oral Phase Severity: Moderate-severe  Pharyngeal Phase Severity : Moderate-severe    PAIN:  Pain level pre-treatment: 0/10   Pain level post-treatment: 0/10     After treatment:   []            Patient left in no apparent distress sitting up in chair  [x]            Patient left in no apparent distress in bed  [x]            Call bell left within reach  [x]            Nursing and RD notified  []            Family present  []            Caregiver present  []            Bed alarm activated    COMMUNICATION/EDUCATION:   [x]            Aspiration precautions; swallow safety; compensatory techniques. [x]            Patient/family have participated as able in goal setting and plan of care. []            Patient/family agree to work toward stated goals and plan of care. []            Patient understands intent and goals of therapy; neutral about participation. [x]            Patient unable to participate in goal setting/plan of care; educ ongoing with interdisciplinary staff  []         Posted safety precautions in patient's room.     Thank you for this referral.  Octavia Schaumann, SLP  Time Calculation: 30 mins  Evaluation Time: 21 minutes   Treatment Time: 9 minutes

## 2020-07-30 NOTE — PROGRESS NOTES
Progress Note      Patient: Judy Valles               Sex: female          DOA: 7/28/2020       YOB: 1955      Age:  77 y.o.        LOS:  LOS: 2 days             CHIEF COMPLAINT:  Pneumonia, metabolic encephalopathy    Subjective:     Patient will rouse and make eye contact  She will shake her head No and Yes  Would not speak currently    Objective:      Visit Vitals  BP (!) 166/103   Pulse 86   Temp 98 °F (36.7 °C)   Resp 15   Ht 5' 3\" (1.6 m)   Wt 50.6 kg (111 lb 8.8 oz)   SpO2 99%   BMI 19.76 kg/m²       Physical Exam:  Gen:  Patient is in no distress. No complaint  HEENT and Neck:  PERRL, No cervical tenderness or masses  Lungs:  Clear bilaterally. No rales, no wheeze. Normal effort. Heart:  Regular Rate and Rhythm. No murmur or gallop. No JVD. No edema.   Abdomen:  Soft, non tender, no masses, no Hepatosplenomegally  Extremities:  No digital clubbing, no cyanosis  Neuro:  Alert and oriented, Normal affect, Cranial nerves intact, No sensory deficits        Lab/Data Reviewed:  BMP:   Lab Results   Component Value Date/Time     (H) 07/30/2020 03:10 AM    K 3.7 07/30/2020 03:10 AM     (H) 07/30/2020 03:10 AM    CO2 23 07/30/2020 03:10 AM    AGAP 4 07/30/2020 03:10 AM     (H) 07/30/2020 03:10 AM    BUN 27 (H) 07/30/2020 03:10 AM    CREA 0.52 (L) 07/30/2020 03:10 AM    GFRAA >60 07/30/2020 03:10 AM    GFRNA >60 07/30/2020 03:10 AM     CBC:   Lab Results   Component Value Date/Time    WBC 7.5 07/30/2020 03:10 AM    HGB 11.0 (L) 07/30/2020 03:10 AM    HCT 34.3 (L) 07/30/2020 03:10 AM     07/30/2020 03:10 AM           Assessment/Plan     Principal Problem:    Acute metabolic encephalopathy (6/53/6730)    Active Problems:    HTN (hypertension) (10/28/2014)      Hypothyroid (10/28/2014)      History of CVA (cerebrovascular accident) (7/28/2020)      Overview: r sided weakness per ED      CAP (community acquired pneumonia) (7/28/2020)      Close exposure to COVID-19 virus (7/28/2020)        Plan:  Continue with IV antibiotics  Will follow mental status, I think she just woke up which could be contributing  Her nurse reported that she was very verbal over night  Follow CBC and Renal function  Follow COVID, still in Droplet Plus precautions  DVT prophylaxis.

## 2020-07-30 NOTE — PROGRESS NOTES
Problem: Falls - Risk of  Goal: *Absence of Falls  Description: Document Solomon Alan Fall Risk and appropriate interventions in the flowsheet. Outcome: Progressing Towards Goal  Note: Fall Risk Interventions:       Mentation Interventions: Evaluate medications/consider consulting pharmacy, Bed/chair exit alarm, Adequate sleep, hydration, pain control, More frequent rounding, Room close to nurse's station, Update white board    Medication Interventions: Bed/chair exit alarm, Evaluate medications/consider consulting pharmacy    Elimination Interventions: Bed/chair exit alarm, Call light in reach, Patient to call for help with toileting needs, Toileting schedule/hourly rounds              Problem: Patient Education: Go to Patient Education Activity  Goal: Patient/Family Education  Outcome: Progressing Towards Goal     Problem: Pressure Injury - Risk of  Goal: *Prevention of pressure injury  Description: Document Daniel Scale and appropriate interventions in the flowsheet. Outcome: Progressing Towards Goal  Note: Pressure Injury Interventions:  Sensory Interventions: Assess changes in LOC, Float heels, Keep linens dry and wrinkle-free, Minimize linen layers, Pad between skin to skin, Pressure redistribution bed/mattress (bed type), Turn and reposition approx. every two hours (pillows and wedges if needed), Maintain/enhance activity level    Moisture Interventions: Absorbent underpads, Apply protective barrier, creams and emollients, Check for incontinence Q2 hours and as needed, Internal/External urinary devices, Maintain skin hydration (lotion/cream), Minimize layers, Moisture barrier, Assess need for specialty bed    Activity Interventions: Pressure redistribution bed/mattress(bed type), Assess need for specialty bed, PT/OT evaluation    Mobility Interventions: HOB 30 degrees or less, Float heels, Pressure redistribution bed/mattress (bed type), PT/OT evaluation, Turn and reposition approx.  every two hours(pillow and wedges), Assess need for specialty bed    Nutrition Interventions: Document food/fluid/supplement intake, Discuss nutritional consult with provider, Offer support with meals,snacks and hydration    Friction and Shear Interventions: HOB 30 degrees or less, Foam dressings/transparent film/skin sealants, Apply protective barrier, creams and emollients, Lift sheet                Problem: Patient Education: Go to Patient Education Activity  Goal: Patient/Family Education  Outcome: Progressing Towards Goal     Problem: Aspiration - Risk of  Goal: *Absence of aspiration  Outcome: Progressing Towards Goal     Problem: Patient Education: Go to Patient Education Activity  Goal: Patient/Family Education  Outcome: Progressing Towards Goal     Problem: Hypertension  Goal: *Blood pressure within specified parameters  Outcome: Progressing Towards Goal  Goal: *Fluid volume balance  Outcome: Progressing Towards Goal  Goal: *Labs within defined limits  Outcome: Progressing Towards Goal     Problem: Patient Education: Go to Patient Education Activity  Goal: Patient/Family Education  Outcome: Progressing Towards Goal     Problem: Chronic Obstructive Pulmonary Disease (COPD)  Goal: *Oxygen saturation during activity within specified parameters  Outcome: Progressing Towards Goal  Goal: *Able to remain out of bed as prescribed  Outcome: Progressing Towards Goal  Goal: *Absence of hypoxia  Outcome: Progressing Towards Goal  Goal: *Optimize nutritional status  Outcome: Progressing Towards Goal     Problem: Patient Education: Go to Patient Education Activity  Goal: Patient/Family Education  Outcome: Progressing Towards Goal     Problem: Nutrition Deficit  Goal: *Optimize nutritional status  Outcome: Progressing Towards Goal     Problem: Patient Education: Go to Patient Education Activity  Goal: Patient/Family Education  Outcome: Progressing Towards Goal     Problem: Patient Education: Go to Patient Education Activity  Goal: Patient/Family Education  Outcome: Progressing Towards Goal

## 2020-07-30 NOTE — PROGRESS NOTES
Responded to RRT    Patient co CP  Tele with michelle like a fib RVR  Her EKG with also possible a fib with RVR  HR up to 170s, on tele up to 140s    Iv morphine given for CP  Iv cardizem push  Labs serial trop , BMP. CBC, MG, PO4, SERIAL TROP. tte    CARDIOLOGY CONSULTED. Dr Pearl Bhat present also     Repeat EKG with slower HR in 120s, its in SR after cardizem injection of 10 mg. Continue observation. Repeat iv morphine if still has CP.

## 2020-07-30 NOTE — PROGRESS NOTES
Problem: Discharge Planning  Goal: *Discharge to safe environment  Outcome: Progressing Towards Goal    PLAN: Home with Home Health and resumption of Personal Care thru [facility] 5 hours daily 5 days/week VS SNF    Reason for Admission:   Pneumonia/Metabolic Encephalopathy                   RUR Score:   15%                  Plan for utilizing home health:    Possible. Pt was active with Joey (was close to discharge from [facility])      PCP: First and Last name:  Edith Coates   Name of Practice: [facility]   Are you a current patient: Yes/No: Yes   Approximate date of last visit:  1 month ago   Can you participate in a virtual visit with your PCP:  yes with the assist of dtr                    Current Advanced Directive/Advance Care Plan: No does not have and has confusion so would not be able to complete AMD                         Transition of Care Plan:   Home with Home Health and resumption of Personal Care thru [facility] 5 hours daily 5 days/week VS SNF          Pt is not speaking and appears to be confused in review of the chart. Called brother listed and he gave me number for daughter Heriberto Vargas. Called Annette. Verified Address,numbers and PCP and updated on face sheet. Patient has designated___unable___  to participate in his/her discharge plan and to receive any needed information. Pt is . Pt has 3 children  Name: Ana Antunez  (dtr who pt lives with)  Phone number:  (l)[phone]      Name: Patricia Day (son-lives in [facility])   Phone number: [phone])[phone]    Name: Robin Galeas (son lives in [place])  Phone number: [phone]) [phone]    Pt lives with dtr, Heriberto Vargas. Patient doesn't ambulate. She is WC bound. Has minimal confusion at home but worsened when she got sick. She requires assistance with transfers and ADL's. DME: W/C, shower chair, ramp. Pt is active with [facility] and was scheduled to be discharged yesterday.  Pt has been to [facility] from 3/27/2020 thru 4/9/2020. Discussed plan/options. Emailed dtr SNF/Home Health lists and to call CM tomorrow to give choices. PLAN : Home with Home Health/resumption of personal care VS SNF(if recommended). Transport will be either by family or BLS depending on how pt's functional status is closer to discharge. Care Management Interventions  PCP Verified by CM: Yes(Shelia Babb([facility])seen last ~ 1 month ago))  Palliative Care Criteria Met (RRAT>21 & CHF Dx)?: No  Mode of Transport at Discharge: (Family vs BLS)  Transition of Care Consult (CM Consult): Discharge Planning  Current Support Network: Lives with Caregiver(Lives with Dtr.  Has Personal Care 5 hours per day 5 days a week)  Confirm Follow Up Transport: Family  The Plan for Transition of Care is Related to the Following Treatment Goals : resolution of symptoms  The Patient and/or Patient Representative was Provided with a Choice of Provider and Agrees with the Discharge Plan?: Yes  Name of the Patient Representative Who was Provided with a Choice of Provider and Agrees with the Discharge Plan: Leah Merino (dtr)  Freedom of Choice List was Provided with Basic Dialogue that Supports the Patient's Individualized Plan of Care/Goals, Treatment Preferences and Shares the Quality Data Associated with the Providers?: Yes  Discharge Location  Discharge Placement: Home with home health(vs SNF)

## 2020-07-30 NOTE — PROGRESS NOTES
19:45- Report received from previous nurse, [place] Xiao RN.   20:00- Assessment completed- see flow sheet. Patient easily awakened to verbal stimuli, when asked questions, patient would mouth words or nod her head, but did say her name and date of birth. No c/o pain/discomfort. Has external female catheter in place, no urine in container. Prior nurse did not report any urine output. Will do a bladder scan. Call light in reach. Continue to monitor. 22:45- Bladder scan done at this time with results of >529 ml  23:25-Dr Natalee Peterson notified regarding no urine output noted- bladder scan done with < 529 ml. Order to insert sheppard catheter. 7/30/2020  00:45- 16 FR sheppard catheter inserted using sterile technique without difficulty with clear, owen, urine out ( 725 ml output)  Patient asked for some water afterwards. 03:10- Morning labs obtained by charge nurse, Petra Acuna, [place]. Patient has no c/o pain/discomfort. Tolerating liquids without difficulty. Call light in reach. Hourly rounding. 07:25- Report given to oncoming nurse, [id number].

## 2020-07-30 NOTE — PROGRESS NOTES
Problem: Falls - Risk of  Goal: *Absence of Falls  Description: Document Rebbecca Persons Fall Risk and appropriate interventions in the flowsheet. Outcome: Progressing Towards Goal  Note: Fall Risk Interventions:       Mentation Interventions: Evaluate medications/consider consulting pharmacy, Bed/chair exit alarm, Adequate sleep, hydration, pain control, More frequent rounding, Room close to nurse's station, Update white board    Medication Interventions: Bed/chair exit alarm, Evaluate medications/consider consulting pharmacy    Elimination Interventions: Bed/chair exit alarm, Call light in reach, Patient to call for help with toileting needs, Toileting schedule/hourly rounds              Problem: Patient Education: Go to Patient Education Activity  Goal: Patient/Family Education  Outcome: Progressing Towards Goal     Problem: Pressure Injury - Risk of  Goal: *Prevention of pressure injury  Description: Document Daniel Scale and appropriate interventions in the flowsheet. Outcome: Progressing Towards Goal  Note: Pressure Injury Interventions:  Sensory Interventions: Assess changes in LOC, Float heels, Keep linens dry and wrinkle-free, Minimize linen layers, Pad between skin to skin, Pressure redistribution bed/mattress (bed type), Turn and reposition approx. every two hours (pillows and wedges if needed), Maintain/enhance activity level    Moisture Interventions: Absorbent underpads, Apply protective barrier, creams and emollients, Check for incontinence Q2 hours and as needed, Internal/External urinary devices, Maintain skin hydration (lotion/cream), Minimize layers, Moisture barrier, Assess need for specialty bed    Activity Interventions: Pressure redistribution bed/mattress(bed type), Assess need for specialty bed, PT/OT evaluation    Mobility Interventions: HOB 30 degrees or less, Float heels, Pressure redistribution bed/mattress (bed type), PT/OT evaluation, Turn and reposition approx.  every two hours(pillow and wedges), Assess need for specialty bed    Nutrition Interventions: Document food/fluid/supplement intake, Discuss nutritional consult with provider, Offer support with meals,snacks and hydration    Friction and Shear Interventions: HOB 30 degrees or less, Foam dressings/transparent film/skin sealants, Apply protective barrier, creams and emollients, Lift sheet                Problem: Patient Education: Go to Patient Education Activity  Goal: Patient/Family Education  Outcome: Progressing Towards Goal     Problem: Aspiration - Risk of  Goal: *Absence of aspiration  Outcome: Progressing Towards Goal     Problem: Patient Education: Go to Patient Education Activity  Goal: Patient/Family Education  Outcome: Progressing Towards Goal     Problem: Hypertension  Goal: *Blood pressure within specified parameters  Outcome: Progressing Towards Goal  Goal: *Fluid volume balance  Outcome: Progressing Towards Goal  Goal: *Labs within defined limits  Outcome: Progressing Towards Goal     Problem: Patient Education: Go to Patient Education Activity  Goal: Patient/Family Education  Outcome: Progressing Towards Goal     Problem: Chronic Obstructive Pulmonary Disease (COPD)  Goal: *Oxygen saturation during activity within specified parameters  Outcome: Progressing Towards Goal  Goal: *Able to remain out of bed as prescribed  Outcome: Progressing Towards Goal  Goal: *Absence of hypoxia  Outcome: Progressing Towards Goal  Goal: *Optimize nutritional status  Outcome: Progressing Towards Goal     Problem: Patient Education: Go to Patient Education Activity  Goal: Patient/Family Education  Outcome: Progressing Towards Goal     Problem: Nutrition Deficit  Goal: *Optimize nutritional status  Outcome: Progressing Towards Goal     Problem: Patient Education: Go to Patient Education Activity  Goal: Patient/Family Education  Outcome: Progressing Towards Goal     Problem: Patient Education: Go to Patient Education Activity  Goal: Patient/Family Education  Outcome: Progressing Towards Goal     Problem: Infection - Risk of, Urinary Catheter-Associated Urinary Tract Infection  Goal: *Absence of infection signs and symptoms  Outcome: Progressing Towards Goal     Problem: Patient Education: Go to Patient Education Activity  Goal: Patient/Family Education  Outcome: Progressing Towards Goal

## 2020-07-30 NOTE — PROGRESS NOTES
0730:  Bedside and Verbal shift change report given to [facility] AT [facility], RN (oncoming nurse) by Henry Silver RN (offgoing nurse). Report included the following information SBAR, MAR, Recent Results, Cardiac Rhythm ST-depression and Alarm Parameters . 1430:  Patient HR in the 130s to 140s. Sinus tach with new ST-Elevation. Dr. Velásquez Must paged. STAT ECG order recieved will be up in approx. 20 mins to assess patient. 1445:  Charge nurse notified about patient change in status. Dr. Apple Randolph paged to update on change in status. 1500:  Patient speaking to me in her room and utters the word, \" Scared. \"  When asked if she is saying she is scared, patient nods head yes. 1510:  2 mg of Morphine and 10 mg of Cardizem given. 1515:  0.4 mg of nitro subling. Given. 1524:  2 mg of morphine given. 1533:  10 mg of Cardizem given. 1600:  Dr. Nohelia Guzman at bedside. 1608:  2 mg of morphine given. 1621:  Metoprolol 2.5 mg given. 1630: HR in the normal ranges of 70s-80s.     1800:  Patient resting comfortably. 1900:  Bedside and Verbal shift change report given to Ashlyn Souza RN (oncoming nurse) by [facility] AT [facility], RN (offgoing nurse). Report included the following information SBAR, Procedure Summary, Intake/Output, MAR, Recent Results, Med Rec Status, Cardiac Rhythm arrythmia and Alarm Parameters .

## 2020-07-31 ENCOUNTER — APPOINTMENT (OUTPATIENT)
Dept: CT IMAGING | Age: 65
DRG: 193 | End: 2020-07-31
Attending: HOSPITALIST
Payer: MEDICARE

## 2020-07-31 LAB
ANION GAP SERPL CALC-SCNC: 7 MMOL/L (ref 3–18)
ATRIAL RATE: 126 BPM
ATRIAL RATE: 129 BPM
ATRIAL RATE: 208 BPM
BACTERIA SPEC CULT: ABNORMAL
BUN SERPL-MCNC: 21 MG/DL (ref 7–18)
BUN/CREAT SERPL: 44 (ref 12–20)
CALCIUM SERPL-MCNC: 10.3 MG/DL (ref 8.5–10.1)
CALCULATED P AXIS, ECG09: 73 DEGREES
CALCULATED P AXIS, ECG09: 79 DEGREES
CALCULATED R AXIS, ECG10: 40 DEGREES
CALCULATED R AXIS, ECG10: 49 DEGREES
CALCULATED R AXIS, ECG10: 49 DEGREES
CALCULATED T AXIS, ECG11: 111 DEGREES
CALCULATED T AXIS, ECG11: 113 DEGREES
CALCULATED T AXIS, ECG11: 117 DEGREES
CC UR VC: ABNORMAL
CHLORIDE SERPL-SCNC: 115 MMOL/L (ref 100–111)
CO2 SERPL-SCNC: 19 MMOL/L (ref 21–32)
CREAT SERPL-MCNC: 0.48 MG/DL (ref 0.6–1.3)
DIAGNOSIS, 93000: NORMAL
ERYTHROCYTE [DISTWIDTH] IN BLOOD BY AUTOMATED COUNT: 12.3 % (ref 11.6–14.5)
GLUCOSE BLD STRIP.AUTO-MCNC: 119 MG/DL (ref 70–110)
GLUCOSE BLD STRIP.AUTO-MCNC: 123 MG/DL (ref 70–110)
GLUCOSE BLD STRIP.AUTO-MCNC: 75 MG/DL (ref 70–110)
GLUCOSE BLD STRIP.AUTO-MCNC: 97 MG/DL (ref 70–110)
GLUCOSE SERPL-MCNC: 110 MG/DL (ref 74–99)
HCT VFR BLD AUTO: 37.8 % (ref 35–45)
HGB BLD-MCNC: 12.5 G/DL (ref 12–16)
MCH RBC QN AUTO: 31.3 PG (ref 24–34)
MCHC RBC AUTO-ENTMCNC: 33.1 G/DL (ref 31–37)
MCV RBC AUTO: 94.5 FL (ref 74–97)
P-R INTERVAL, ECG05: 114 MS
P-R INTERVAL, ECG05: 114 MS
PLATELET # BLD AUTO: 254 K/UL (ref 135–420)
PMV BLD AUTO: 11.8 FL (ref 9.2–11.8)
POTASSIUM SERPL-SCNC: 4.1 MMOL/L (ref 3.5–5.5)
Q-T INTERVAL, ECG07: 284 MS
Q-T INTERVAL, ECG07: 290 MS
Q-T INTERVAL, ECG07: 300 MS
QRS DURATION, ECG06: 112 MS
QRS DURATION, ECG06: 72 MS
QRS DURATION, ECG06: 72 MS
QTC CALCULATION (BEZET), ECG08: 424 MS
QTC CALCULATION (BEZET), ECG08: 434 MS
QTC CALCULATION (BEZET), ECG08: 511 MS
RBC # BLD AUTO: 4 M/UL (ref 4.2–5.3)
SARS-COV-2, COV2NT: NOT DETECTED
SERVICE CMNT-IMP: ABNORMAL
SODIUM SERPL-SCNC: 141 MMOL/L (ref 136–145)
TROPONIN I SERPL-MCNC: <0.02 NG/ML (ref 0–0.04)
VENTRICULAR RATE, ECG03: 126 BPM
VENTRICULAR RATE, ECG03: 129 BPM
VENTRICULAR RATE, ECG03: 195 BPM
WBC # BLD AUTO: 8.6 K/UL (ref 4.6–13.2)

## 2020-07-31 PROCEDURE — 74011250637 HC RX REV CODE- 250/637: Performed by: HOSPITALIST

## 2020-07-31 PROCEDURE — 93005 ELECTROCARDIOGRAM TRACING: CPT

## 2020-07-31 PROCEDURE — 85027 COMPLETE CBC AUTOMATED: CPT

## 2020-07-31 PROCEDURE — 74011000258 HC RX REV CODE- 258: Performed by: HOSPITALIST

## 2020-07-31 PROCEDURE — 77030021352 HC CBL LD SYS DISP COVD -B

## 2020-07-31 PROCEDURE — 36415 COLL VENOUS BLD VENIPUNCTURE: CPT

## 2020-07-31 PROCEDURE — 84484 ASSAY OF TROPONIN QUANT: CPT

## 2020-07-31 PROCEDURE — 74011250636 HC RX REV CODE- 250/636: Performed by: HOSPITALIST

## 2020-07-31 PROCEDURE — 74011000250 HC RX REV CODE- 250: Performed by: HOSPITALIST

## 2020-07-31 PROCEDURE — 82962 GLUCOSE BLOOD TEST: CPT

## 2020-07-31 PROCEDURE — 80048 BASIC METABOLIC PNL TOTAL CA: CPT

## 2020-07-31 PROCEDURE — 74011636637 HC RX REV CODE- 636/637: Performed by: HOSPITALIST

## 2020-07-31 PROCEDURE — 65660000000 HC RM CCU STEPDOWN

## 2020-07-31 RX ORDER — SODIUM CHLORIDE 9 MG/ML
100 INJECTION, SOLUTION INTRAVENOUS ONCE
Status: DISPENSED | OUTPATIENT
Start: 2020-07-31 | End: 2020-08-01

## 2020-07-31 RX ORDER — INSULIN GLARGINE 100 [IU]/ML
8 INJECTION, SOLUTION SUBCUTANEOUS DAILY
Status: DISCONTINUED | OUTPATIENT
Start: 2020-08-01 | End: 2020-08-03

## 2020-07-31 RX ADMIN — INSULIN GLARGINE 10 UNITS: 100 INJECTION, SOLUTION SUBCUTANEOUS at 09:17

## 2020-07-31 RX ADMIN — METOPROLOL TARTRATE 2.5 MG: 5 INJECTION INTRAVENOUS at 17:34

## 2020-07-31 RX ADMIN — AMLODIPINE BESYLATE 10 MG: 10 TABLET ORAL at 09:16

## 2020-07-31 RX ADMIN — ASPIRIN 81 MG: 81 TABLET, FILM COATED ORAL at 09:00

## 2020-07-31 RX ADMIN — METOPROLOL TARTRATE 2.5 MG: 5 INJECTION INTRAVENOUS at 05:51

## 2020-07-31 RX ADMIN — METOPROLOL TARTRATE 2.5 MG: 5 INJECTION INTRAVENOUS at 22:22

## 2020-07-31 RX ADMIN — SODIUM CHLORIDE 100 ML/HR: 450 INJECTION, SOLUTION INTRAVENOUS at 21:40

## 2020-07-31 RX ADMIN — PRAVASTATIN SODIUM 10 MG: 20 TABLET ORAL at 21:40

## 2020-07-31 RX ADMIN — DEXAMETHASONE 10 MG: 2 TABLET ORAL at 09:17

## 2020-07-31 RX ADMIN — ENOXAPARIN SODIUM 40 MG: 40 INJECTION SUBCUTANEOUS at 09:17

## 2020-07-31 RX ADMIN — Medication 10 ML: at 05:52

## 2020-07-31 RX ADMIN — AZITHROMYCIN MONOHYDRATE 250 MG: 500 INJECTION, POWDER, LYOPHILIZED, FOR SOLUTION INTRAVENOUS at 22:16

## 2020-07-31 RX ADMIN — Medication 10 ML: at 21:49

## 2020-07-31 RX ADMIN — Medication 10 ML: at 17:33

## 2020-07-31 NOTE — ROUTINE PROCESS
TRANSFER - OUT REPORT: 
 
Verbal report given to AVELINA FLORES(name) on Olga Abreu  being transferred to [address] [place] [address](unit) for routine progression of care Report consisted of patients Situation, Background, Assessment and  
Recommendations(SBAR). Information from the following report(s) SBAR, Kardex, [facility] and Recent Results was reviewed with the receiving nurse. Lines:  
Peripheral IV 07/28/20 Right Forearm (Active) Site Assessment Clean, dry, & intact 07/31/20 0400 Phlebitis Assessment 0 07/31/20 0400 Infiltration Assessment 0 07/31/20 0400 Dressing Status Clean, dry, & intact 07/31/20 0400 Dressing Type Transparent 07/31/20 0400 Hub Color/Line Status Pink;Clotted; Flushed 07/31/20 0000 Action Taken Open ports on tubing capped 07/31/20 0000 Alcohol Cap Used Yes 07/31/20 0000 Peripheral IV 07/30/20 Left Antecubital (Active) Site Assessment Clean, dry, & intact 07/31/20 0400 Phlebitis Assessment 0 07/31/20 0400 Infiltration Assessment 0 07/31/20 0400 Dressing Status Clean, dry, & intact 07/31/20 0400 Dressing Type Transparent 07/31/20 0400 Hub Color/Line Status Yellow;Capped;Flushed 07/31/20 0000 Action Taken Open ports on tubing capped 07/31/20 0400 Alcohol Cap Used Yes 07/31/20 0000 Peripheral IV 07/30/20 Right Forearm (Active) Site Assessment Clean, dry, & intact 07/31/20 0400 Phlebitis Assessment 0 07/31/20 0400 Infiltration Assessment 0 07/31/20 0400 Dressing Status Clean, dry, & intact 07/31/20 0400 Dressing Type Transparent 07/31/20 0400 Hub Color/Line Status Blue; Infusing 07/31/20 0000 Action Taken Open ports on tubing capped 07/31/20 0400 Alcohol Cap Used Yes 07/31/20 0400 Peripheral IV 07/30/20 Posterior;Right Forearm (Active) Site Assessment Intact 07/31/20 0400 Phlebitis Assessment 0 07/31/20 0400 Infiltration Assessment 0 07/31/20 0400 Dressing Status Clean, dry, & intact 07/31/20 0400 Dressing Type Transparent 07/31/20 0400 Hub Color/Line Status Pink;Capped;Flushed 07/31/20 0000 Action Taken Open ports on tubing capped 07/31/20 0000 Alcohol Cap Used Yes 07/31/20 0000 Opportunity for questions and clarification was provided. Patient transported with: 
 Monitor O2 @ 2 liters Registered Nurse

## 2020-07-31 NOTE — PROGRESS NOTES
Assumed care from [facility] AT [facility]. Patient is awake and alert, shakes head when asked if in pain. No distress noted. 0050> Bedside and Verbal shift change report given to Rosangela Mahoney RN (oncoming nurse) by Mirella Juárez RN (offgoing nurse).  Report included the following information SBAR, Kardex, Intake/Output, MAR, Recent Results and Cardiac Rhythm NSR w/ depressed ST.

## 2020-07-31 NOTE — PROGRESS NOTES
Problem: Falls - Risk of  Goal: *Absence of Falls  Description: Document Sadeamelia Ochoa Fall Risk and appropriate interventions in the flowsheet. Outcome: Progressing Towards Goal  Note: Fall Risk Interventions:       Mentation Interventions: Bed/chair exit alarm, Adequate sleep, hydration, pain control    Medication Interventions: Bed/chair exit alarm    Elimination Interventions: Bed/chair exit alarm              Problem: Pressure Injury - Risk of  Goal: *Prevention of pressure injury  Description: Document Daniel Scale and appropriate interventions in the flowsheet.   Outcome: Progressing Towards Goal  Note: Pressure Injury Interventions:  Sensory Interventions: Assess changes in LOC    Moisture Interventions: Absorbent underpads    Activity Interventions: Pressure redistribution bed/mattress(bed type)    Mobility Interventions: HOB 30 degrees or less    Nutrition Interventions: Discuss nutritional consult with provider, Document food/fluid/supplement intake    Friction and Shear Interventions: HOB 30 degrees or less                Problem: Aspiration - Risk of  Goal: *Absence of aspiration  Outcome: Progressing Towards Goal     Problem: Hypertension  Goal: *Blood pressure within specified parameters  Outcome: Progressing Towards Goal

## 2020-07-31 NOTE — PROGRESS NOTES
18   Rec'd report from [address] [place] [address], Raya Townsend. Pt resting in bed opens eyes, nodds appropriately. One word responses at times/ aphasic, confused? .VSS, 02 at 2 liters.

## 2020-07-31 NOTE — ROUTINE PROCESS
1850: Patient is resting comfortably. During the day patient vocalized the need for water and was fed thickened water throughout the day with a spoon. No CNA for night shift 2 nurses are coming to the floor.

## 2020-07-31 NOTE — PROGRESS NOTES
Progress Note      Patient: Lainey Gauthier               Sex: female          DOA: 7/28/2020       YOB: 1955      Age:  77 y.o.        LOS:  LOS: 3 days             CHIEF COMPLAINT:  Pneumonia with acute metabolic encephalopathy    Subjective:     Patient is awake and talking today  Minimal confusion    Objective:      Visit Vitals  /87   Pulse 85   Temp 98.2 °F (36.8 °C)   Resp 13   Ht 5' 3\" (1.6 m)   Wt 50.6 kg (111 lb 8.8 oz)   SpO2 100%   BMI 19.76 kg/m²       Physical Exam:  Gen:  Patient is in no distress. No complaint  HEENT and Neck:  PERRL, No cervical tenderness or masses  Lungs:  Clear bilaterally. No rales, no wheeze. Normal effort. Heart:  Regular Rate and Rhythm. No murmur or gallop. No JVD. No edema.   Abdomen:  Soft, non tender, no masses, no Hepatosplenomegally  Extremities:  No digital clubbing, no cyanosis  Neuro:  Alert and oriented, Normal affect, Cranial nerves intact, No sensory deficits        Lab/Data Reviewed:  BMP:   Lab Results   Component Value Date/Time     07/31/2020 03:22 PM    K 4.1 07/31/2020 03:22 PM     (H) 07/31/2020 03:22 PM    CO2 19 (L) 07/31/2020 03:22 PM    AGAP 7 07/31/2020 03:22 PM     (H) 07/31/2020 03:22 PM    BUN 21 (H) 07/31/2020 03:22 PM    CREA 0.48 (L) 07/31/2020 03:22 PM    GFRAA >60 07/31/2020 03:22 PM    GFRNA >60 07/31/2020 03:22 PM     CBC:   Lab Results   Component Value Date/Time    WBC 8.6 07/31/2020 03:22 PM    HGB 12.5 07/31/2020 03:22 PM    HCT 37.8 07/31/2020 03:22 PM     07/31/2020 03:22 PM           Assessment/Plan     Principal Problem:    Acute metabolic encephalopathy (2/14/2115)    Active Problems:    HTN (hypertension) (10/28/2014)      Hypothyroid (10/28/2014)      History of CVA (cerebrovascular accident) (7/28/2020)      Overview: r sided weakness per ED      CAP (community acquired pneumonia) (7/28/2020)      Close exposure to COVID-19 virus (7/28/2020)      PSVT (paroxysmal supraventricular tachycardia) ([facility] [address] (7/30/2020)        Plan:  Continue with antibiotics  Follow respiratory status  BP control  Follow cardiac rhythm, cardiology consulted  Follow renal function  Monitor neurological status  DVT prophylaxis.

## 2020-07-31 NOTE — PROGRESS NOTES
Patient: Christianne Casas  MR #: [id number]      Reason for consult: Arrhythmia    Assessment/Plan:     Subjective  No cardiac complaint per nursing staff. Patient appears comfortable through glass door. Resting and no distress    Hospital Problems  Date Reviewed: 8/29/2017          Codes Class Noted POA    PSVT (paroxysmal supraventricular tachycardia) ([facility] [address] patient had an episode of narrow complex arrhythmia with a heart rate of approximately 190 to 200 bpm 7/30/20 She was given two10 mg doses of intravenous Cardizem after which the arrhythmia broke with resultant sinus tachycardia with frequent PACs. Currently tele with sinus tachycardia  Continue intravenous metoprolol 2.5 mg every 6 hours with holding parameters. Obtain echocardiogram when possible. ICD-10-CM: I47.1  ICD-9-CM: 427.0  7/30/2020 No        * (Principal) Acute metabolic encephalopathy MARIE-23-QU: G93.41  ICD-9-CM: 348.31  7/28/2020 Unknown        History of CVA (cerebrovascular accident) ICD-10-CM: Z86.73  ICD-9-CM: V12.54  7/28/2020 Unknown    Overview Signed 7/28/2020  7:02 PM by Allie Severs, MD     r sided weakness per ED. MRI of the head on 2/19/2020. MRI of the head 2/19/2020. Numerous chronic deep cerebral and pontine lacunar type infarcts and chronic bilateral cerebellar and left parietal cortical infarcts. CAP (community acquired pneumonia) ICD-10-CM: J18.9  ICD-9-CM: 599  7/28/2020 Unknown        Close exposure to COVID-19 virus ICD-10-CM: Z20.828  ICD-9-CM: V01.79  7/28/2020 Unknown        HTN (hypertension) ICD-10-CM: I10  ICD-9-CM: 401.9  10/28/2014 Yes        Hypothyroid ICD-10-CM: E03.9  ICD-9-CM: 244.9  10/28/2014 Yes              History of Present Illness  Christianne Casas is a 77 y.o. hypertensive diabetic woman admitted for further evaluation of low oxygen saturations which were monitored by the patient's physical therapist and daughter at home.   Daughter reported that she was recently diagnosed with COVID on 7/2/2020. Patient has a history of prior CVAs and has residual right-sided weakness as well as speech disturbance. She is unable to detail any history. The patient's admission chest x-ray demonstrated faint possible opacities in the right lung. Admission ECG demonstrated sinus rhythm with nonspecific ST and T wave abnormalities. She had an echocardiogram done in June 2017 that demonstrated normal systolic function with mild concentric LVH. Admission WBC was 13.9. She has had a COVID test which is pending. Earlier this evening, the patient developed a rapid heart rate with initial twelve-lead electrocardiogram demonstrating a narrow complex tachycardia with a heart rate of 192 bpm.  She was given 2 boluses of Cardizem and the  resulting rhythm was sinus tachycardia with frequent PACs.   He is presently in sinus rhythm with a heart rate of approximately 130 bpm.          Past Medical History  Past Medical History:   Diagnosis Date    Diabetes ([facility] [address]     Hypertension     Echo 6-25-15- borderline LVH, normal EF- CAV     Hypothyroid        Past Surgical History  Social History     Socioeconomic History    Marital status:      Spouse name: Not on file    Number of children: Not on file    Years of education: Not on file    Highest education level: Not on file   Occupational History    Not on file   Social Needs    Financial resource strain: Not on file    Food insecurity     Worry: Not on file     Inability: Not on file    Transportation needs     Medical: Not on file     Non-medical: Not on file   Tobacco Use    Smoking status: Never Smoker    Smokeless tobacco: Never Used   Substance and Sexual Activity    Alcohol use: No     Alcohol/week: 0.0 standard drinks    Drug use: No    Sexual activity: Not on file   Lifestyle    Physical activity     Days per week: Not on file     Minutes per session: Not on file    Stress: Not on file   Relationships    Social connections     Talks on phone: Not on file     Gets together: Not on file     Attends Religion service: Not on file     Active member of club or organization: Not on file     Attends meetings of clubs or organizations: Not on file     Relationship status: Not on file    Intimate partner violence     Fear of current or ex partner: Not on file     Emotionally abused: Not on file     Physically abused: Not on file     Forced sexual activity: Not on file   Other Topics Concern    Not on file   Social History Narrative    Not on file         Meds  Current Facility-Administered Medications   Medication Dose Route Frequency    [START ON 8/1/2020] insulin glargine (LANTUS) injection 8 Units  8 Units SubCUTAneous DAILY    nitroglycerin (NITROSTAT) tablet 0.4 mg  0.4 mg SubLINGual PRN    metoprolol (LOPRESSOR) injection 2.5 mg  2.5 mg IntraVENous Q6H    azithromycin (ZITHROMAX) 250 mg in 0.9% sodium chloride 250 mL IVPB  250 mg IntraVENous Q24H    cefTRIAXone (ROCEPHIN) 1 g in 0.9% sodium chloride (MBP/ADV) 50 mL MBP  1 g IntraVENous Q24H    dexAMETHasone (DECADRON) tablet 10 mg  10 mg Oral DAILY    0.45% sodium chloride infusion  100 mL/hr IntraVENous CONTINUOUS    amLODIPine (NORVASC) tablet 10 mg  10 mg Oral DAILY    aspirin delayed-release tablet 81 mg  81 mg Oral DAILY    pravastatin (PRAVACHOL) tablet 10 mg  10 mg Oral QHS    sodium chloride (NS) flush 5-40 mL  5-40 mL IntraVENous Q8H    sodium chloride (NS) flush 5-40 mL  5-40 mL IntraVENous PRN    acetaminophen (TYLENOL) tablet 650 mg  650 mg Oral Q6H PRN    Or    acetaminophen (TYLENOL) suppository 650 mg  650 mg Rectal Q6H PRN    polyethylene glycol (MIRALAX) packet 17 g  17 g Oral DAILY PRN    promethazine (PHENERGAN) tablet 12.5 mg  12.5 mg Oral Q6H PRN    Or    ondansetron (ZOFRAN) injection 4 mg  4 mg IntraVENous Q6H PRN    enoxaparin (LOVENOX) injection 40 mg  40 mg SubCUTAneous DAILY    insulin lispro (HUMALOG) injection   SubCUTAneous AC&HS    glucose chewable tablet 16 g  4 Tab Oral PRN    glucagon (GLUCAGEN) injection 1 mg  1 mg IntraMUSCular PRN         Allergies  No Known Allergies    Social History  Social History     Socioeconomic History    Marital status:      Spouse name: Not on file    Number of children: Not on file    Years of education: Not on file    Highest education level: Not on file   Occupational History    Not on file   Social Needs    Financial resource strain: Not on file    Food insecurity     Worry: Not on file     Inability: Not on file    Transportation needs     Medical: Not on file     Non-medical: Not on file   Tobacco Use    Smoking status: Never Smoker    Smokeless tobacco: Never Used   Substance and Sexual Activity    Alcohol use: No     Alcohol/week: 0.0 standard drinks    Drug use: No    Sexual activity: Not on file   Lifestyle    Physical activity     Days per week: Not on file     Minutes per session: Not on file    Stress: Not on file   Relationships    Social connections     Talks on phone: Not on file     Gets together: Not on file     Attends Orthodoxy service: Not on file     Active member of club or organization: Not on file     Attends meetings of clubs or organizations: Not on file     Relationship status: Not on file    Intimate partner violence     Fear of current or ex partner: Not on file     Emotionally abused: Not on file     Physically abused: Not on file     Forced sexual activity: Not on file   Other Topics Concern    Not on file   Social History Narrative    Not on file       Family History     Family History   Problem Relation Age of Onset    Hypertension Mother     Hypertension Brother     Hypertension Maternal Aunt     No Known Problems Father          Review of systems    Unobtainable  Physical Exam  Visit Vitals  /82   Pulse 63   Temp 98.2 °F (36.8 °C)   Resp 10   Ht 5' 3\" (1.6 m)   Wt 111 lb 8.8 oz (50.6 kg)   SpO2 100%   BMI 19.76 kg/m²     Patient had a limited exam consisting primarily of inspection means to avoid exposure to COVID and preserve PPE. Fernando Conway is . She does not appear to be in significant respiratory distress. Nursing exam remarkable for clear lungs without significant rales. No murmur. Soft abdomen. There is no peripheral edema. Diagnostic Tests Initial hospital ECG: Sinus rhythm. Diffuse nondiagnostic ST and T wave abnormalities. Labs:   Recent Labs     07/30/20  1525 07/30/20  0310 07/29/20  0502   WBC 7.5 7.5 13.2   HGB 12.9 11.0* 11.0*   HCT 39.7 34.3* 35.3    248 235     Recent Labs     07/30/20  1525 07/30/20  0310 07/29/20  0502 07/28/20  1508    150* 152* 153*   K 3.7 3.7 4.1 4.4   * 123* 123* 125*   CO2 21 23 25 22   * 116* 166* 197*   BUN 24* 27* 36* 42*   CREA 0.57* 0.52* 0.78 1.12   CA 10.4* 9.7 9.7 10.0   MG 2.3  --   --   --    PHOS 2.2*  --   --   --    ALB  --   --   --  2.5*   ALT  --   --   --  48       Recent Labs     07/30/20  2104 07/30/20  1525 07/28/20  1508   TROIQ 0.04 <0.02 <0.02   CPK  --   --  157   CKMB  --   --  3.1     Last Lipid:    Lab Results   Component Value Date/Time    Cholesterol, total 168 09/01/2017 01:00 PM    HDL Cholesterol 67 09/01/2017 01:00 PM    LDL, calculated 83 09/01/2017 01:00 PM    Triglyceride 88 09/01/2017 01:00 PM    CHOL/HDL Ratio 3.1 06/23/2017 04:15 AM           We appreciate the opportunity to see Samantha Abreu with you. We will follow along.     Jozef Cr MD  7/31/2020

## 2020-07-31 NOTE — PROGRESS NOTES
Chart reviewed. Plan remains home with daughter, home health & personal care, which is already in place. Will assess for possible SNF needs. Available as needed. Virginia Momin RN,ext [phone].

## 2020-07-31 NOTE — PROGRESS NOTES
0730 Bedside and Verbal shift change report given to AVELINA OBRIEN (oncoming nurse) by Dickson Jaime (offgoing nurse). Report included the following information SBAR, Kardex, MAR and Recent Results. 0900 took patient vs. Medications crushed in apple sauce administered, pt tolerated with ease, will continue to monitor. 1330 Shift reassessment, pt condition unchanged, will continue to monitor. Per CNA refused meal       1730 Shift reassessment, pt condition unchanged, will continue to monitor. Gave med's through IV patient tolerated well. 1930 Bedside, Verbal and Written shift change report given to AVELINA BURR  (oncoming nurse) by Palmira Woody (offgoing nurse). Report included the following information SBAR, Kardex, MAR and Recent Results.

## 2020-07-31 NOTE — CONSULTS
CARDIOLOGY CONSULT    Patient: Jeff Rausch  MR #: [id number]      Reason for consult: Arrhythmia    Assessment/Plan:     Hospital Problems  Date Reviewed: 8/29/2017          Codes Class Noted POA    PSVT (paroxysmal supraventricular tachycardia) ([facility] [address] patient had an episode of narrow complex arrhythmia with a heart rate of approximately 190 to 200 bpm this evening. She was given two10 mg doses of intravenous Cardizem after which the arrhythmia broke with resultant sinus tachycardia with frequent PACs. Will start intravenous metoprolol 2.5 mg every 6 hours with holding parameters. Obtain echocardiogram when possible. COVID STATUS pending. ICD-10-CM: I47.1  ICD-9-CM: 427.0  7/30/2020 No        * (Principal) Acute metabolic encephalopathy BKR-45-SQ: G93.41  ICD-9-CM: 348.31  7/28/2020 Unknown        History of CVA (cerebrovascular accident) ICD-10-CM: Z86.73  ICD-9-CM: V12.54  7/28/2020 Unknown    Overview Signed 7/28/2020  7:02 PM by Lisa Ramírez MD     r sided weakness per ED. MRI of the head on 2/19/2020. MRI of the head 2/19/2020. Numerous chronic deep cerebral and pontine lacunar type infarcts and chronic bilateral cerebellar and left parietal cortical infarcts. CAP (community acquired pneumonia) ICD-10-CM: J18.9  ICD-9-CM: 536  7/28/2020 Unknown        Close exposure to COVID-19 virus ICD-10-CM: Z20.828  ICD-9-CM: V01.79  7/28/2020 Unknown        HTN (hypertension) ICD-10-CM: I10  ICD-9-CM: 401.9  10/28/2014 Yes        Hypothyroid ICD-10-CM: E03.9  ICD-9-CM: 244.9  10/28/2014 Yes              History of Present Illness  Jeff Rausch is a 77 y.o. hypertensive diabetic woman admitted for further evaluation of low oxygen saturations which were monitored by the patient's physical therapist and daughter at home. Daughter reported that she was recently diagnosed with COVID on 7/2/2020. Patient has a history of prior CVAs and has residual right-sided weakness as well as speech disturbance. She is unable to detail any history. The patient's admission chest x-ray demonstrated faint possible opacities in the right lung. Admission ECG demonstrated sinus rhythm with nonspecific ST and T wave abnormalities. She had an echocardiogram done in June 2017 that demonstrated normal systolic function with mild concentric LVH. Admission WBC was 13.9. She has had a COVID test which is pending. Earlier this evening, the patient developed a rapid heart rate with initial twelve-lead electrocardiogram demonstrating a narrow complex tachycardia with a heart rate of 192 bpm.  She was given 2 boluses of Cardizem and the  resulting rhythm was sinus tachycardia with frequent PACs.   He is presently in sinus rhythm with a heart rate of approximately 130 bpm.          Past Medical History  Past Medical History:   Diagnosis Date    Diabetes ([facility] [address]     Hypertension     Echo 6-25-15- borderline LVH, normal EF- CAV     Hypothyroid        Past Surgical History  Social History     Socioeconomic History    Marital status:      Spouse name: Not on file    Number of children: Not on file    Years of education: Not on file    Highest education level: Not on file   Occupational History    Not on file   Social Needs    Financial resource strain: Not on file    Food insecurity     Worry: Not on file     Inability: Not on file    Transportation needs     Medical: Not on file     Non-medical: Not on file   Tobacco Use    Smoking status: Never Smoker    Smokeless tobacco: Never Used   Substance and Sexual Activity    Alcohol use: No     Alcohol/week: 0.0 standard drinks    Drug use: No    Sexual activity: Not on file   Lifestyle    Physical activity     Days per week: Not on file     Minutes per session: Not on file    Stress: Not on file   Relationships    Social connections     Talks on phone: Not on file     Gets together: Not on file     Attends Latter-day service: Not on file     Active member of club or organization: Not on file     Attends meetings of clubs or organizations: Not on file     Relationship status: Not on file    Intimate partner violence     Fear of current or ex partner: Not on file     Emotionally abused: Not on file     Physically abused: Not on file     Forced sexual activity: Not on file   Other Topics Concern    Not on file   Social History Narrative    Not on file         Meds  Current Facility-Administered Medications   Medication Dose Route Frequency    nitroglycerin (NITROSTAT) tablet 0.4 mg  0.4 mg SubLINGual PRN    metoprolol (LOPRESSOR) injection 2.5 mg  2.5 mg IntraVENous Q6H    insulin glargine (LANTUS) injection 10 Units  10 Units SubCUTAneous DAILY    azithromycin (ZITHROMAX) 250 mg in 0.9% sodium chloride 250 mL IVPB  250 mg IntraVENous Q24H    cefTRIAXone (ROCEPHIN) 1 g in 0.9% sodium chloride (MBP/ADV) 50 mL MBP  1 g IntraVENous Q24H    dexAMETHasone (DECADRON) tablet 10 mg  10 mg Oral DAILY    0.45% sodium chloride infusion  100 mL/hr IntraVENous CONTINUOUS    amLODIPine (NORVASC) tablet 10 mg  10 mg Oral DAILY    aspirin delayed-release tablet 81 mg  81 mg Oral DAILY    pravastatin (PRAVACHOL) tablet 10 mg  10 mg Oral QHS    sodium chloride (NS) flush 5-40 mL  5-40 mL IntraVENous Q8H    sodium chloride (NS) flush 5-40 mL  5-40 mL IntraVENous PRN    acetaminophen (TYLENOL) tablet 650 mg  650 mg Oral Q6H PRN    Or    acetaminophen (TYLENOL) suppository 650 mg  650 mg Rectal Q6H PRN    polyethylene glycol (MIRALAX) packet 17 g  17 g Oral DAILY PRN    promethazine (PHENERGAN) tablet 12.5 mg  12.5 mg Oral Q6H PRN    Or    ondansetron (ZOFRAN) injection 4 mg  4 mg IntraVENous Q6H PRN    enoxaparin (LOVENOX) injection 40 mg  40 mg SubCUTAneous DAILY    insulin lispro (HUMALOG) injection   SubCUTAneous AC&HS    glucose chewable tablet 16 g  4 Tab Oral PRN    glucagon (GLUCAGEN) injection 1 mg  1 mg IntraMUSCular PRN         Allergies  No Known Allergies    Social History  Social History     Socioeconomic History    Marital status:      Spouse name: Not on file    Number of children: Not on file    Years of education: Not on file    Highest education level: Not on file   Occupational History    Not on file   Social Needs    Financial resource strain: Not on file    Food insecurity     Worry: Not on file     Inability: Not on file    Transportation needs     Medical: Not on file     Non-medical: Not on file   Tobacco Use    Smoking status: Never Smoker    Smokeless tobacco: Never Used   Substance and Sexual Activity    Alcohol use: No     Alcohol/week: 0.0 standard drinks    Drug use: No    Sexual activity: Not on file   Lifestyle    Physical activity     Days per week: Not on file     Minutes per session: Not on file    Stress: Not on file   Relationships    Social connections     Talks on phone: Not on file     Gets together: Not on file     Attends Moravian service: Not on file     Active member of club or organization: Not on file     Attends meetings of clubs or organizations: Not on file     Relationship status: Not on file    Intimate partner violence     Fear of current or ex partner: Not on file     Emotionally abused: Not on file     Physically abused: Not on file     Forced sexual activity: Not on file   Other Topics Concern    Not on file   Social History Narrative    Not on file       Family History     Family History   Problem Relation Age of Onset    Hypertension Mother     Hypertension Brother     Hypertension Maternal Aunt     No Known Problems Father          Review of systems    Unobtainable  Physical Exam  Visit Vitals  /66   Pulse 72   Temp 98 °F (36.7 °C)   Resp 20   Ht 5' 3\" (1.6 m)   Wt 111 lb 8.8 oz (50.6 kg)   SpO2 100%   BMI 19.76 kg/m²     Patient had a limited exam consisting primarily of inspection means to avoid exposure to COVID and preserve PPE. Ulysses Vasquez is who is alert and looking around the room. She does not appear to be in significant respiratory distress. Nursing exam remarkable for clear lungs. She is a rapid heart rate without murmur. There is no peripheral edema. Diagnostic Tests Initial hospital ECG: Sinus rhythm. Diffuse nondiagnostic ST and T wave abnormalities. Labs:   Recent Labs     07/30/20  1525 07/30/20  0310 07/29/20  0502   WBC 7.5 7.5 13.2   HGB 12.9 11.0* 11.0*   HCT 39.7 34.3* 35.3    248 235     Recent Labs     07/30/20  1525 07/30/20  0310 07/29/20  0502 07/28/20  1508    150* 152* 153*   K 3.7 3.7 4.1 4.4   * 123* 123* 125*   CO2 21 23 25 22   * 116* 166* 197*   BUN 24* 27* 36* 42*   CREA 0.57* 0.52* 0.78 1.12   CA 10.4* 9.7 9.7 10.0   MG 2.3  --   --   --    PHOS 2.2*  --   --   --    ALB  --   --   --  2.5*   ALT  --   --   --  48       Recent Labs     07/30/20  1525 07/28/20  1508   TROIQ <0.02 <0.02   CPK  --  157   CKMB  --  3.1     Last Lipid:    Lab Results   Component Value Date/Time    Cholesterol, total 168 09/01/2017 01:00 PM    HDL Cholesterol 67 09/01/2017 01:00 PM    LDL, calculated 83 09/01/2017 01:00 PM    Triglyceride 88 09/01/2017 01:00 PM    CHOL/HDL Ratio 3.1 06/23/2017 04:15 AM           We appreciate the opportunity to see Sue Abreu with you. We will follow along.     Aníbal Alvarado MD  7/30/2020

## 2020-08-01 ENCOUNTER — APPOINTMENT (OUTPATIENT)
Dept: NON INVASIVE DIAGNOSTICS | Age: 65
DRG: 193 | End: 2020-08-01
Attending: INTERNAL MEDICINE
Payer: MEDICARE

## 2020-08-01 LAB
ECHO IVC PROX: 1.55 CM
ECHO LA MAJOR AXIS: 1.87 CM
ECHO LA MINOR AXIS: 1.24 CM
ECHO LV EDV A4C: 48.93 ML
ECHO LV EDV INDEX A4C: 32.5 ML/M2
ECHO LV EJECTION FRACTION A4C: 30 PERCENT
ECHO LV ESV A4C: 34.12 ML
ECHO LV ESV INDEX A4C: 22.7 ML/M2
ECHO LV INTERNAL DIMENSION DIASTOLIC: 2.91 CM (ref 3.9–5.3)
ECHO LV INTERNAL DIMENSION SYSTOLIC: 2.03 CM
ECHO LV IVSD: 1.06 CM (ref 0.6–0.9)
ECHO LV MASS 2D: 75 G (ref 67–162)
ECHO LV MASS INDEX 2D: 49.8 G/M2 (ref 43–95)
ECHO LV POSTERIOR WALL DIASTOLIC: 0.88 CM (ref 0.6–0.9)
ECHO LVOT DIAM: 1.86 CM
ECHO MV A VELOCITY: 89.2 CM/S
ECHO MV E DECELERATION TIME (DT): 0.13 S
ECHO MV E VELOCITY: 46.97 CM/S
ECHO MV E/A RATIO: 0.53
ECHO TV REGURGITANT MAX VELOCITY: 341 CM/S
ECHO TV REGURGITANT PEAK GRADIENT: 46.6 MMHG
GLUCOSE BLD STRIP.AUTO-MCNC: 110 MG/DL (ref 70–110)
GLUCOSE BLD STRIP.AUTO-MCNC: 119 MG/DL (ref 70–110)
GLUCOSE BLD STRIP.AUTO-MCNC: 169 MG/DL (ref 70–110)
GLUCOSE BLD STRIP.AUTO-MCNC: 89 MG/DL (ref 70–110)

## 2020-08-01 PROCEDURE — 93306 TTE W/DOPPLER COMPLETE: CPT

## 2020-08-01 PROCEDURE — 74011000258 HC RX REV CODE- 258: Performed by: HOSPITALIST

## 2020-08-01 PROCEDURE — 74011250636 HC RX REV CODE- 250/636: Performed by: HOSPITALIST

## 2020-08-01 PROCEDURE — 65660000000 HC RM CCU STEPDOWN

## 2020-08-01 PROCEDURE — 82962 GLUCOSE BLOOD TEST: CPT

## 2020-08-01 PROCEDURE — 74011636637 HC RX REV CODE- 636/637: Performed by: HOSPITALIST

## 2020-08-01 PROCEDURE — 77010033678 HC OXYGEN DAILY

## 2020-08-01 PROCEDURE — 74011000250 HC RX REV CODE- 250: Performed by: HOSPITALIST

## 2020-08-01 PROCEDURE — 74011250637 HC RX REV CODE- 250/637: Performed by: HOSPITALIST

## 2020-08-01 RX ORDER — SODIUM CHLORIDE 9 MG/ML
500 INJECTION, SOLUTION INTRAVENOUS ONCE
Status: COMPLETED | OUTPATIENT
Start: 2020-08-01 | End: 2020-08-01

## 2020-08-01 RX ORDER — HYDRALAZINE HYDROCHLORIDE 20 MG/ML
20 INJECTION INTRAMUSCULAR; INTRAVENOUS
Status: DISCONTINUED | OUTPATIENT
Start: 2020-08-01 | End: 2020-08-04 | Stop reason: HOSPADM

## 2020-08-01 RX ORDER — LOSARTAN POTASSIUM 50 MG/1
100 TABLET ORAL DAILY
Status: DISCONTINUED | OUTPATIENT
Start: 2020-08-01 | End: 2020-08-04 | Stop reason: HOSPADM

## 2020-08-01 RX ORDER — HYDROCHLOROTHIAZIDE 25 MG/1
12.5 TABLET ORAL DAILY
Status: DISCONTINUED | OUTPATIENT
Start: 2020-08-01 | End: 2020-08-04 | Stop reason: HOSPADM

## 2020-08-01 RX ADMIN — ACETAMINOPHEN 650 MG: 325 TABLET, FILM COATED ORAL at 14:25

## 2020-08-01 RX ADMIN — INSULIN GLARGINE 8 UNITS: 100 INJECTION, SOLUTION SUBCUTANEOUS at 09:22

## 2020-08-01 RX ADMIN — METOPROLOL TARTRATE 2.5 MG: 5 INJECTION INTRAVENOUS at 16:49

## 2020-08-01 RX ADMIN — SODIUM CHLORIDE 100 ML/HR: 450 INJECTION, SOLUTION INTRAVENOUS at 09:20

## 2020-08-01 RX ADMIN — Medication 10 ML: at 14:00

## 2020-08-01 RX ADMIN — ASPIRIN 81 MG: 81 TABLET, FILM COATED ORAL at 09:30

## 2020-08-01 RX ADMIN — METOPROLOL TARTRATE 2.5 MG: 5 INJECTION INTRAVENOUS at 04:47

## 2020-08-01 RX ADMIN — SODIUM CHLORIDE 100 ML/HR: 450 INJECTION, SOLUTION INTRAVENOUS at 23:46

## 2020-08-01 RX ADMIN — PRAVASTATIN SODIUM 10 MG: 20 TABLET ORAL at 23:45

## 2020-08-01 RX ADMIN — INSULIN LISPRO 2 UNITS: 100 INJECTION, SOLUTION INTRAVENOUS; SUBCUTANEOUS at 16:48

## 2020-08-01 RX ADMIN — Medication 10 ML: at 05:25

## 2020-08-01 RX ADMIN — LOSARTAN POTASSIUM 100 MG: 50 TABLET, FILM COATED ORAL at 11:26

## 2020-08-01 RX ADMIN — METOPROLOL TARTRATE 2.5 MG: 5 INJECTION INTRAVENOUS at 11:03

## 2020-08-01 RX ADMIN — METOPROLOL TARTRATE 2.5 MG: 5 INJECTION INTRAVENOUS at 23:45

## 2020-08-01 RX ADMIN — DEXAMETHASONE 10 MG: 2 TABLET ORAL at 09:30

## 2020-08-01 RX ADMIN — HYDROCHLOROTHIAZIDE 12.5 MG: 25 TABLET ORAL at 11:26

## 2020-08-01 RX ADMIN — AZITHROMYCIN MONOHYDRATE 250 MG: 500 INJECTION, POWDER, LYOPHILIZED, FOR SOLUTION INTRAVENOUS at 23:55

## 2020-08-01 RX ADMIN — CEFTRIAXONE 1 G: 1 INJECTION, POWDER, FOR SOLUTION INTRAMUSCULAR; INTRAVENOUS at 16:47

## 2020-08-01 RX ADMIN — SODIUM CHLORIDE 500 ML/HR: 900 INJECTION, SOLUTION INTRAVENOUS at 14:23

## 2020-08-01 RX ADMIN — AMLODIPINE BESYLATE 10 MG: 10 TABLET ORAL at 09:30

## 2020-08-01 RX ADMIN — ENOXAPARIN SODIUM 40 MG: 40 INJECTION SUBCUTANEOUS at 09:23

## 2020-08-01 NOTE — PROGRESS NOTES
08/01/20 1115   Vital Signs   Temp 97.4 °F (36.3 °C)   Temp Source Oral   Pulse (Heart Rate) 86   Heart Rate Source Monitor   Resp Rate 16   O2 Sat (%) 99 %   Level of Consciousness Alert   BP (!) 205/118   MAP (Calculated) 147   MEWS Score 3     MD aware of the patient's blood pressure and MEWS score.

## 2020-08-01 NOTE — PROGRESS NOTES
Problem: Falls - Risk of  Goal: *Absence of Falls  Description: Document Vickii Bridges Fall Risk and appropriate interventions in the flowsheet. Outcome: Progressing Towards Goal  Note: Fall Risk Interventions:       Mentation Interventions: Adequate sleep, hydration, pain control, Bed/chair exit alarm, Door open when patient unattended, Evaluate medications/consider consulting pharmacy, More frequent rounding, Reorient patient, Toileting rounds, Update white board    Medication Interventions: Bed/chair exit alarm, Evaluate medications/consider consulting pharmacy    Elimination Interventions: Bed/chair exit alarm, Call light in reach, Patient to call for help with toileting needs, Toileting schedule/hourly rounds              Problem: Patient Education: Go to Patient Education Activity  Goal: Patient/Family Education  Outcome: Progressing Towards Goal     Problem: Pressure Injury - Risk of  Goal: *Prevention of pressure injury  Description: Document Daniel Scale and appropriate interventions in the flowsheet. Outcome: Progressing Towards Goal  Note: Pressure Injury Interventions:  Sensory Interventions: Assess changes in LOC, Discuss PT/OT consult with provider, Keep linens dry and wrinkle-free, Pad between skin to skin, Turn and reposition approx.  every two hours (pillows and wedges if needed)    Moisture Interventions: Absorbent underpads, Check for incontinence Q2 hours and as needed, Internal/External urinary devices, Offer toileting Q_hr    Activity Interventions: Pressure redistribution bed/mattress(bed type), PT/OT evaluation    Mobility Interventions: Assess need for specialty bed, Float heels, Pressure redistribution bed/mattress (bed type), PT/OT evaluation    Nutrition Interventions: Document food/fluid/supplement intake    Friction and Shear Interventions: Apply protective barrier, creams and emollients, Foam dressings/transparent film/skin sealants, Lift team/patient mobility team, Transferring/repositioning devices                Problem: Patient Education: Go to Patient Education Activity  Goal: Patient/Family Education  Outcome: Progressing Towards Goal     Problem: Aspiration - Risk of  Goal: *Absence of aspiration  Outcome: Progressing Towards Goal     Problem: Patient Education: Go to Patient Education Activity  Goal: Patient/Family Education  Outcome: Progressing Towards Goal     Problem: Hypertension  Goal: *Blood pressure within specified parameters  Outcome: Progressing Towards Goal  Goal: *Fluid volume balance  Outcome: Progressing Towards Goal  Goal: *Labs within defined limits  Outcome: Progressing Towards Goal     Problem: Patient Education: Go to Patient Education Activity  Goal: Patient/Family Education  Outcome: Progressing Towards Goal     Problem: Chronic Obstructive Pulmonary Disease (COPD)  Goal: *Oxygen saturation during activity within specified parameters  Outcome: Progressing Towards Goal  Goal: *Able to remain out of bed as prescribed  Outcome: Progressing Towards Goal  Goal: *Absence of hypoxia  Outcome: Progressing Towards Goal  Goal: *Optimize nutritional status  Outcome: Progressing Towards Goal     Problem: Discharge Planning  Goal: *Discharge to safe environment  Outcome: Progressing Towards Goal     Problem: Nutrition Deficit  Goal: *Optimize nutritional status  Outcome: Progressing Towards Goal     Problem: Patient Education: Go to Patient Education Activity  Goal: Patient/Family Education  Outcome: Progressing Towards Goal

## 2020-08-01 NOTE — PROGRESS NOTES
[facility] - Progress Note  Admit Date: 7/28/2020 f/u SVT, HTN in setting of PNA    Assessment:     Hospital Problems  Date Reviewed: 8/29/2017          Codes Class Noted POA    PSVT (paroxysmal supraventricular tachycardia) (HCC) ICD-10-CM: I47.1  ICD-9-CM: 427.0  7/30/2020 No        * (Principal) Acute metabolic encephalopathy LIDIA-76-QO: G93.41  ICD-9-CM: 348.31  7/28/2020 Unknown        History of CVA (cerebrovascular accident) ICD-10-CM: Z86.73  ICD-9-CM: V12.54  7/28/2020 Unknown    Overview Signed 7/28/2020  7:02 PM by Alysia Altman MD     r sided weakness per ED             CAP (community acquired pneumonia) ICD-10-CM: J18.9  ICD-9-CM: 965  7/28/2020 Unknown        Close exposure to COVID-19 virus ICD-10-CM: Z20.828  ICD-9-CM: V01.79  7/28/2020 Unknown        HTN (hypertension) ICD-10-CM: I10  ICD-9-CM: 401.9  10/28/2014 Yes        Hypothyroid ICD-10-CM: E03.9  ICD-9-CM: 244.9  10/28/2014 Yes          s/p SVT    Plan:     no events overnight, see normal echo report below  Added hydral IV prn for better BP control  Will recheck again tomorrow    Subjective:     Chart/ case reviewed, trying to reduce exposure/ conserve PPE, exam was deferred      Objective:      Patient Vitals for the past 8 hrs:   Temp Pulse Resp BP SpO2   08/01/20 1115 97.4 °F (36.3 °C) 86 16 (!) 205/118 99 %   08/01/20 1103  (!) 108  (!) 179/112    08/01/20 0946    (!) 174/113    08/01/20 0940  (!) 103  (!) 181/105    08/01/20 0925  72  (!) 174/113    08/01/20 0748 97.6 °F (36.4 °C) 70 16 (!) 168/106 99 %         Patient Vitals for the past 96 hrs:   Weight   08/01/20 0946 50.3 kg (111 lb)   07/30/20 0629 50.6 kg (111 lb 8.8 oz)   07/29/20 0316 47.9 kg (105 lb 11.2 oz)         Intake/Output Summary (Last 24 hours) at 8/1/2020 1328  Last data filed at 8/1/2020 1316  Gross per 24 hour   Intake 2395 ml   Output 850 ml   Net 1545 ml       Physical Exam:  Not examined    Data Review:     Labs: Results: Chemistry Recent Labs     07/31/20  1522 07/30/20  1525 07/30/20  0310   * 150* 116*    144 150*   K 4.1 3.7 3.7   * 117* 123*   CO2 19* 21 23   BUN 21* 24* 27*   CREA 0.48* 0.57* 0.52*   CA 10.3* 10.4* 9.7   MG  --  2.3  --    PHOS  --  2.2*  --    AGAP 7 6 4   BUCR 44* 42* 52*      CBC w/Diff Recent Labs     07/31/20  1522 07/30/20  1525 07/30/20  0310   WBC 8.6 7.5 7.5   RBC 4.00* 4.12* 3.52*   HGB 12.5 12.9 11.0*   HCT 37.8 39.7 34.3*    249 248   GRANS  --  81* 83*   LYMPH  --  17* 15*   EOS  --  0 0      Cardiac Enzymes Lab Results   Component Value Date/Time    TROIQ <0.02 07/31/2020 03:22 PM      Coagulation No results for input(s): PTP, INR, APTT, INREXT in the last 72 hours. Lipid Panel Lab Results   Component Value Date/Time    Cholesterol, total 168 09/01/2017 01:00 PM    HDL Cholesterol 67 09/01/2017 01:00 PM    LDL, calculated 83 09/01/2017 01:00 PM    VLDL, calculated 18 09/01/2017 01:00 PM    Triglyceride 88 09/01/2017 01:00 PM    CHOL/HDL Ratio 3.1 06/23/2017 04:15 AM      BNP No results found for: BNP, BNPP, XBNPT   Liver Enzymes No results for input(s): TP, ALB, TBIL, AP in the last 72 hours. No lab exists for component: SGOT, GPT, DBIL   Digoxin    Thyroid Studies Lab Results   Component Value Date/Time    TSH 2.40 07/28/2020 03:08 PM          07/28/20   ECHO ADULT COMPLETE 08/01/2020 8/1/2020    Narrative · Normal cavity size, wall thickness, systolic function (ejection fraction   normal) and diastolic function. Estimated left ventricular ejection   fraction is 55 - 60%. Visually measured ejection fraction. Wall motion:   normal.  · Mild tricuspid valve regurgitation is present. · Moderate pulmonary hypertension. Pulmonary arterial systolic pressure is   50 mmHg. · No significant changes noted compared to prior report.         Signed by: Angela Schroeder DO         Signed By: Emelina Whitmore DO     August 1, 2020

## 2020-08-01 NOTE — PROGRESS NOTES
Bedside shift change report given to this RN (oncoming nurse) by Karen RN (offgoing nurse). Report included the following information SBAR, Kardex and Cardiac Rhythm SR.     1350 Received a call from the telemetry monitor tech stating that the patient's heart rate is in the 120's with a rhythm of sinus tachy. Notified MD concerning this matter. 1435 Received several calls from the telemetry monitor tech stating that the patient's heart rate is in the 120's and 140's. Patient is receiving a bolus of normal saline per MD orders. Patient was given tylenol for pain and discomfort. Will continue to monitor.

## 2020-08-01 NOTE — PROGRESS NOTES
Internal Medicine Progress Note    Patient's Name: Elisha Rios  Admit Date: 7/28/2020  Length of Stay: 4      Assessment/Plan     Active Hospital Problems    Diagnosis Date Noted    PSVT (paroxysmal supraventricular tachycardia) ([facility] [address] 07/30/2020    Acute metabolic encephalopathy 70/99/3837    History of CVA (cerebrovascular accident) 07/28/2020     r sided weakness per ED      CAP (community acquired pneumonia) 07/28/2020    Close exposure to COVID-19 virus 07/28/2020    Hypothyroid 10/28/2014    HTN (hypertension) 10/28/2014     - Cont IVAB (final dose of azithromycin today)  - Remains afebrile no leukocytosis  - Stop decadron  - BPs very high, will restart home meds  - Appreciate cardio assistance  - PT/OT  - Cont acceptable home medications for chronic conditions   - DVT protocol    I have personally reviewed all pertinent labs and films that have officially resulted over the last 24 hours. I have personally checked for all pending labs that are awaiting final results.     Subjective     Pt s/e @ bedside  No major events overnight  At baseline not interacting much  Confused to place/time    Objective     Visit Vitals  BP (!) 205/118   Pulse 86   Temp 97.4 °F (36.3 °C)   Resp 16   Ht 5' 3\" (1.6 m)   Wt 50.3 kg (111 lb)   SpO2 99%   BMI 19.66 kg/m²       Physical Exam:  General Appearance: NAD, conversant  HEENT: AT/NC, dry mucus membrane  Lungs: CTA with normal respiratory effort  CV: RRR, no m/r/g  Abdomen: soft, non-tender, normal bowel sounds  Extremities: no cyanosis, no peripheral edema    Lab/Data Reviewed:  BMP:   Lab Results   Component Value Date/Time     07/31/2020 03:22 PM    K 4.1 07/31/2020 03:22 PM     (H) 07/31/2020 03:22 PM    CO2 19 (L) 07/31/2020 03:22 PM    AGAP 7 07/31/2020 03:22 PM     (H) 07/31/2020 03:22 PM    BUN 21 (H) 07/31/2020 03:22 PM    CREA 0.48 (L) 07/31/2020 03:22 PM    GFRAA >60 07/31/2020 03:22 PM    GFRNA >60 07/31/2020 03:22 PM     CBC:   Lab Results   Component Value Date/Time    WBC 8.6 07/31/2020 03:22 PM    HGB 12.5 07/31/2020 03:22 PM    HCT 37.8 07/31/2020 03:22 PM     07/31/2020 03:22 PM       Imaging Reviewed:  No results found.     Medications Reviewed:  Current Facility-Administered Medications   Medication Dose Route Frequency    losartan (COZAAR) tablet 100 mg  100 mg Oral DAILY    And    hydroCHLOROthiazide (HYDRODIURIL) tablet 12.5 mg  12.5 mg Oral DAILY    insulin glargine (LANTUS) injection 8 Units  8 Units SubCUTAneous DAILY    nitroglycerin (NITROSTAT) tablet 0.4 mg  0.4 mg SubLINGual PRN    metoprolol (LOPRESSOR) injection 2.5 mg  2.5 mg IntraVENous Q6H    azithromycin (ZITHROMAX) 250 mg in 0.9% sodium chloride 250 mL IVPB  250 mg IntraVENous Q24H    cefTRIAXone (ROCEPHIN) 1 g in 0.9% sodium chloride (MBP/ADV) 50 mL MBP  1 g IntraVENous Q24H    dexAMETHasone (DECADRON) tablet 10 mg  10 mg Oral DAILY    0.45% sodium chloride infusion  100 mL/hr IntraVENous CONTINUOUS    amLODIPine (NORVASC) tablet 10 mg  10 mg Oral DAILY    aspirin delayed-release tablet 81 mg  81 mg Oral DAILY    pravastatin (PRAVACHOL) tablet 10 mg  10 mg Oral QHS    sodium chloride (NS) flush 5-40 mL  5-40 mL IntraVENous Q8H    sodium chloride (NS) flush 5-40 mL  5-40 mL IntraVENous PRN    acetaminophen (TYLENOL) tablet 650 mg  650 mg Oral Q6H PRN    Or    acetaminophen (TYLENOL) suppository 650 mg  650 mg Rectal Q6H PRN    polyethylene glycol (MIRALAX) packet 17 g  17 g Oral DAILY PRN    promethazine (PHENERGAN) tablet 12.5 mg  12.5 mg Oral Q6H PRN    Or    ondansetron (ZOFRAN) injection 4 mg  4 mg IntraVENous Q6H PRN    enoxaparin (LOVENOX) injection 40 mg  40 mg SubCUTAneous DAILY    insulin lispro (HUMALOG) injection   SubCUTAneous AC&HS    glucose chewable tablet 16 g  4 Tab Oral PRN    glucagon (GLUCAGEN) injection 1 mg  1 mg IntraMUSCular PRN           Moreno Cruz DO  Internal Medicine, Hospitalist  Pager: [phone]  [facility]

## 2020-08-01 NOTE — PROGRESS NOTES
2030  -- TRANSFER - IN REPORT:    PT being received from ICU (unit - 0517) for routine progression of care      Report consisted of patients Situation, Background, Assessment and   Recommendations(SBAR). Information from the following report(s) SBAR, Procedure Summary, MAR and Recent Results was reviewed with the receiving nurse. Opportunity for questions and clarification was provided. Assessment completed upon patients arrival to unit and care assumed. Cardiac monitor applied. Transporting RN Carlos Kelly, to contact saleem Watson [phone], to update on transfer. 2216 --PM medications administered, pt tolerated with ease, will continue to monitor. 0000 --  Shift reassessment, pt condition unchanged, will continue to monitor.      0315 -- Shift reassessment, pt condition unchanged, will continue to monitor.        -- Bedside, Verbal and Written shift change report given to (oncoming nurse) by SANDRA (offgoing nurse). Report included the following information SBAR, Kardex, Intake/Output, MAR and Recent Results. Skin assessment completed.

## 2020-08-02 LAB
GLUCOSE BLD STRIP.AUTO-MCNC: 125 MG/DL (ref 70–110)
GLUCOSE BLD STRIP.AUTO-MCNC: 136 MG/DL (ref 70–110)
GLUCOSE BLD STRIP.AUTO-MCNC: 43 MG/DL (ref 70–110)
GLUCOSE BLD STRIP.AUTO-MCNC: 46 MG/DL (ref 70–110)
GLUCOSE BLD STRIP.AUTO-MCNC: 58 MG/DL (ref 70–110)
GLUCOSE BLD STRIP.AUTO-MCNC: 89 MG/DL (ref 70–110)
GLUCOSE BLD STRIP.AUTO-MCNC: 95 MG/DL (ref 70–110)

## 2020-08-02 PROCEDURE — 74011250636 HC RX REV CODE- 250/636: Performed by: HOSPITALIST

## 2020-08-02 PROCEDURE — 77030040392 HC DRSG OPTIFOAM MDII -A

## 2020-08-02 PROCEDURE — 74011250637 HC RX REV CODE- 250/637: Performed by: HOSPITALIST

## 2020-08-02 PROCEDURE — 74011250637 HC RX REV CODE- 250/637: Performed by: INTERNAL MEDICINE

## 2020-08-02 PROCEDURE — 74011636637 HC RX REV CODE- 636/637: Performed by: HOSPITALIST

## 2020-08-02 PROCEDURE — 97162 PT EVAL MOD COMPLEX 30 MIN: CPT | Performed by: PHYSICAL THERAPIST

## 2020-08-02 PROCEDURE — 74011000258 HC RX REV CODE- 258: Performed by: HOSPITALIST

## 2020-08-02 PROCEDURE — 65660000000 HC RM CCU STEPDOWN

## 2020-08-02 PROCEDURE — 97163 PT EVAL HIGH COMPLEX 45 MIN: CPT | Performed by: PHYSICAL THERAPIST

## 2020-08-02 PROCEDURE — 74011000250 HC RX REV CODE- 250: Performed by: HOSPITALIST

## 2020-08-02 PROCEDURE — 82962 GLUCOSE BLOOD TEST: CPT

## 2020-08-02 PROCEDURE — 77010033678 HC OXYGEN DAILY

## 2020-08-02 RX ORDER — METOPROLOL TARTRATE 50 MG/1
50 TABLET ORAL 2 TIMES DAILY
Status: DISCONTINUED | OUTPATIENT
Start: 2020-08-02 | End: 2020-08-04 | Stop reason: HOSPADM

## 2020-08-02 RX ADMIN — Medication 10 ML: at 14:00

## 2020-08-02 RX ADMIN — PRAVASTATIN SODIUM 10 MG: 20 TABLET ORAL at 22:22

## 2020-08-02 RX ADMIN — ENOXAPARIN SODIUM 40 MG: 40 INJECTION SUBCUTANEOUS at 09:46

## 2020-08-02 RX ADMIN — ASPIRIN 81 MG: 81 TABLET, FILM COATED ORAL at 09:00

## 2020-08-02 RX ADMIN — SODIUM CHLORIDE 100 ML/HR: 450 INJECTION, SOLUTION INTRAVENOUS at 14:00

## 2020-08-02 RX ADMIN — AMLODIPINE BESYLATE 10 MG: 10 TABLET ORAL at 09:45

## 2020-08-02 RX ADMIN — INSULIN GLARGINE 8 UNITS: 100 INJECTION, SOLUTION SUBCUTANEOUS at 09:47

## 2020-08-02 RX ADMIN — CEFTRIAXONE 1 G: 1 INJECTION, POWDER, FOR SOLUTION INTRAMUSCULAR; INTRAVENOUS at 17:23

## 2020-08-02 RX ADMIN — DEXTROSE 250 ML: 10 SOLUTION INTRAVENOUS at 22:24

## 2020-08-02 RX ADMIN — HYDROCHLOROTHIAZIDE 12.5 MG: 25 TABLET ORAL at 09:45

## 2020-08-02 RX ADMIN — METOPROLOL TARTRATE 50 MG: 50 TABLET, FILM COATED ORAL at 12:14

## 2020-08-02 RX ADMIN — LOSARTAN POTASSIUM 100 MG: 50 TABLET, FILM COATED ORAL at 09:45

## 2020-08-02 RX ADMIN — METOPROLOL TARTRATE 50 MG: 50 TABLET, FILM COATED ORAL at 17:29

## 2020-08-02 RX ADMIN — Medication 10 ML: at 00:18

## 2020-08-02 NOTE — PROGRESS NOTES
PHYSICAL THERAPY EVALUATION    Patient: Fabiola Benedict (41 y.o. female)  Date: 8/2/2020  Primary Diagnosis: Acute metabolic encephalopathy [W91.20]        Precautions:  Fall monitor BP    PLOF: unknown at time of eval. Pt was verbal but not making clear sentences. ASSESSMENT :  Based on the objective data described below, the patient presents with increased /91 therefore supine bed evaluation was performed. Pt was verbal however, did not answer yes/no questions correctly. Patients sentences were not correctly formulated and difficult to understand. She did not respond to verbal commands. PROM of B UE/LE was WNL. Total dependence for all bed mobility as she was unable to follow verbal commands and was totally flaccid. Unable to assess functional transfers due to elevated BP. Patient will benefit from skilled intervention to address the above impairments. Patient's rehabilitation potential is considered to be Fair  Factors which may influence rehabilitation potential include:   []         None noted  [x]         Mental ability/status  [x]         Medical condition  []         Home/family situation and support systems  [x]         Safety awareness  []         Pain tolerance/management  []         Other:      PLAN :  Recommendations and Planned Interventions:   [x]           Bed Mobility Training             [x]    Neuromuscular Re-Education  [x]           Transfer Training                   []    Orthotic/Prosthetic Training  [x]           Gait Training                          []    Modalities  [x]           Therapeutic Exercises           []    Edema Management/Control  [x]           Therapeutic Activities            [x]    Family Training/Education  [x]           Patient Education  []           Other (comment):    Frequency/Duration: Patient will be followed by physical therapy 1-2 times per day/4-7 days per week to address goals.   Discharge Recommendations: [address] Recommendations for Discharge: N/A     SUBJECTIVE:   Pt is able to speak however, sentences are not correctly formulated and are not comprehendible. OBJECTIVE DATA SUMMARY:     Past Medical History:   Diagnosis Date    Diabetes ([address]     Hypertension     Echo 6-25-15- borderline LVH, normal EF- CAV     Hypothyroid      Past Surgical History:   Procedure Laterality Date    HX  SECTION          HX CHOLECYSTECTOMY      HX COLONOSCOPY      HX PARATHYROIDECTOMY      had 2 glands removed    HX THYROIDECTOMY       Barriers to Learning/Limitations: yes;  cognitive  Compensate with: N/A  Home Situation:  Home Situation  Home Environment: Private residence  One/Two Story Residence: One story  Living Alone: No  Support Systems: Child(roverto)  Patient Expects to be Discharged to[de-identified] Private residence  Current DME Used/Available at Home: None  Strength:    Strength: Grossly decreased, non-functional  Unable to be assessed due to cognition. Tone & Sensation:    Flaccid   Range Of Motion:   PROM: Within functional limits     Functional Mobility:  Bed Mobility:  Rolling: Total assistance    Pain:  Pain level pre-treatment: unknown   Pain level post-treatment: unknown  Pain Intervention(s) : NA  Response to intervention: Nurse notified that transfers were unable to be assessed due to BP. Pt is total dependent due to cognitive state. Activity Tolerance:   Poor  Please refer to the flowsheet for vital signs taken during this treatment. After treatment:   []         Patient left in no apparent distress sitting up in chair  [x]         Patient left in no apparent distress in bed  [x]         Call bell left within reach  [x]         Nursing notified  []         Caregiver present  []         Bed alarm activated  []         SCDs applied    COMMUNICATION/EDUCATION:   [x]         Role of Physical Therapy in the acute care setting.   []         Fall prevention education was provided and the patient/caregiver indicated understanding. []         Patient/family have participated as able in goal setting and plan of care. []         Patient/family agree to work toward stated goals and plan of care. []         Patient understands intent and goals of therapy, but is neutral about his/her participation. [x]         Patient is unable to participate in goal setting/plan of care: ongoing with therapy staff.  []         Other:     Thank you for this referral.  Melo Carson DPT   Time Calculation: 11 mins      Eval Complexity: History: HIGH Complexity :3+ comorbidities / personal factors will impact the outcome/ POC Exam:MEDIUM Complexity : 3 Standardized tests and measures addressing body structure, function, activity limitation and / or participation in recreation  Presentation: MEDIUM Complexity : Evolving with changing characteristics  Clinical Decision Making:medium Overall Complexity:MEDIUM

## 2020-08-02 NOTE — PROGRESS NOTES
19:25- Report received from previous nurse, Shreya Feliciano RN.   21:43= Accucheck=43 and 92  Patient has no c/o at this time. Patient alert, assisted to drink apple juice. Re-check= 58   Patient continues to talk, and reported that she does feel better. Will continue to follow protocol. Patient refusing to drink at this time. Follow hypoglycemic protocol for D10.

## 2020-08-02 NOTE — PROGRESS NOTES
Internal Medicine Progress Note    Patient's Name: Amelia Plummer Date: 7/28/2020  Length of Stay: 5      Assessment/Plan     C/Beatriz Jon [address]ood [address] Problems    Diagnosis Date Noted    PSVT (paroxysmal supraventricular tachycardia) ([facility] 07/30/2020    Acute metabolic encephalopathy 88/22/2479    History of CVA (cerebrovascular accident) 07/28/2020     r sided weakness per ED      CAP (community acquired pneumonia) 07/28/2020    Close exposure to COVID-19 virus 07/28/2020    Hypothyroid 10/28/2014    HTN (hypertension) 10/28/2014     - Cont IVAB  - Remains afebrile no leukocytosis  - BP improving some  - Cont current BP regimen  - Appreciate cardio assistance  - PT/OT  - Cont acceptable home medications for chronic conditions   - DVT protocol    I have personally reviewed all pertinent labs and films that have officially resulted over the last 24 hours. I have personally checked for all pending labs that are awaiting final results. Subjective     Pt s/e @ bedside  No major events overnight  Minimal interaction  Knows name, but not much ore  Bedbound    Objective     Visit Vitals  BP (!) 174/106   Pulse 84   Temp 97.6 °F (36.4 °C)   Resp 18   Ht 5' 3\" (1.6 m)   Wt 50.3 kg (111 lb)   SpO2 99%   BMI 19.66 kg/m²       Physical Exam:  General Appearance: NAD, confused at baseline  HEENT: AT/NC, dry mucus membrane  Lungs: CTA with normal respiratory effort  CV: RRR, no m/r/g  Abdomen: soft, non-tender, normal bowel sounds  Extremities: no cyanosis, no peripheral edema    Lab/Data Reviewed:  BMP:   No results found for: NA, K, CL, CO2, AGAP, GLU, BUN, CREA, GFRAA, GFRNA  CBC:   No results found for: WBC, HGB, HGBEXT, HCT, HCTEXT, PLT, PLTEXT, HGBEXT, HCTEXT, PLTEXT    Imaging Reviewed:  No results found.     Medications Reviewed:  Current Facility-Administered Medications   Medication Dose Route Frequency    metoprolol tartrate (LOPRESSOR) tablet 50 mg  50 mg Oral BID    losartan (COZAAR) tablet 100 mg  100 mg Oral DAILY    And    hydroCHLOROthiazide (HYDRODIURIL) tablet 12.5 mg  12.5 mg Oral DAILY    hydrALAZINE (APRESOLINE) 20 mg/mL injection 20 mg  20 mg IntraVENous Q6H PRN    insulin glargine (LANTUS) injection 8 Units  8 Units SubCUTAneous DAILY    nitroglycerin (NITROSTAT) tablet 0.4 mg  0.4 mg SubLINGual PRN    cefTRIAXone (ROCEPHIN) 1 g in 0.9% sodium chloride (MBP/ADV) 50 mL MBP  1 g IntraVENous Q24H    0.45% sodium chloride infusion  100 mL/hr IntraVENous CONTINUOUS    amLODIPine (NORVASC) tablet 10 mg  10 mg Oral DAILY    aspirin delayed-release tablet 81 mg  81 mg Oral DAILY    pravastatin (PRAVACHOL) tablet 10 mg  10 mg Oral QHS    sodium chloride (NS) flush 5-40 mL  5-40 mL IntraVENous Q8H    sodium chloride (NS) flush 5-40 mL  5-40 mL IntraVENous PRN    acetaminophen (TYLENOL) tablet 650 mg  650 mg Oral Q6H PRN    Or    acetaminophen (TYLENOL) suppository 650 mg  650 mg Rectal Q6H PRN    polyethylene glycol (MIRALAX) packet 17 g  17 g Oral DAILY PRN    promethazine (PHENERGAN) tablet 12.5 mg  12.5 mg Oral Q6H PRN    Or    ondansetron (ZOFRAN) injection 4 mg  4 mg IntraVENous Q6H PRN    enoxaparin (LOVENOX) injection 40 mg  40 mg SubCUTAneous DAILY    insulin lispro (HUMALOG) injection   SubCUTAneous AC&HS    glucose chewable tablet 16 g  4 Tab Oral PRN    glucagon (GLUCAGEN) injection 1 mg  1 mg IntraMUSCular PRN           Ana Bro DO  Internal Medicine, Hospitalist  Pager: [phone]  [facility]

## 2020-08-02 NOTE — PROGRESS NOTES
[facility] - Progress Note  Admit Date: 7/28/2020 Follow up SVT, HTN    Assessment:     Hospital Problems  Date Reviewed: 8/29/2017          Codes Class Noted POA    PSVT (paroxysmal supraventricular tachycardia) ([facility] [address] ICD-10-CM: I47.1  ICD-9-CM: 427.0  7/30/2020 No        * (Principal) Acute metabolic encephalopathy RWO-34-EW: G93.41  ICD-9-CM: 348.31  7/28/2020 Unknown        History of CVA (cerebrovascular accident) ICD-10-CM: Z86.73  ICD-9-CM: V12.54  7/28/2020 Unknown    Overview Signed 7/28/2020  7:02 PM by Tejal Vance MD     r sided weakness per ED             CAP (community acquired pneumonia) ICD-10-CM: J18.9  ICD-9-CM: 245  7/28/2020 Unknown        Close exposure to COVID-19 virus ICD-10-CM: Z20.828  ICD-9-CM: V01.79  7/28/2020 Unknown        HTN (hypertension) ICD-10-CM: I10  ICD-9-CM: 401.9  10/28/2014 Yes        Hypothyroid ICD-10-CM: E03.9  ICD-9-CM: 244.9  10/28/2014 Yes            S/p SVT  HTN, uncontrolled  CAP    Plan:     No overnight events  See normal echo report  BP improving, added po BB (instead of prn IV)    Subjective:     No new complaints.  Sleeping, tele reviewed    Objective:      Patient Vitals for the past 8 hrs:   Temp Pulse Resp BP SpO2   08/02/20 0755 97.6 °F (36.4 °C) 84 18 (!) 174/106 99 %   08/02/20 0447 97.8 °F (36.6 °C) 81 18 (!) 163/103 98 %         Patient Vitals for the past 96 hrs:   Weight   08/01/20 0946 50.3 kg (111 lb)   07/30/20 0629 50.6 kg (111 lb 8.8 oz)         Intake/Output Summary (Last 24 hours) at 8/2/2020 0759  Last data filed at 8/1/2020 1907  Gross per 24 hour   Intake 1326.7 ml   Output 600 ml   Net 726.7 ml       Physical Exam:  General:  resting, no distress, appears stated age  Neck:   No JVD apprieciated  Lungs/ Heart; ascultation deferred, no acc m use  Abdomen:  abdomen is soft without significant tenderness, masses, organomegaly or guarding  Extremities:  extremities normal, atraumatic, no cyanosis or edema  Neuro: opens eyes when I called her name, no asym noted    Data Review:     Labs: Results:       Chemistry Recent Labs     07/31/20  1522 07/30/20  1525   * 150*    144   K 4.1 3.7   * 117*   CO2 19* 21   BUN 21* 24*   CREA 0.48* 0.57*   CA 10.3* 10.4*   MG  --  2.3   PHOS  --  2.2*   AGAP 7 6   BUCR 44* 42*      CBC w/Diff Recent Labs     07/31/20  1522 07/30/20  1525   WBC 8.6 7.5   RBC 4.00* 4.12*   HGB 12.5 12.9   HCT 37.8 39.7    249   GRANS  --  81*   LYMPH  --  17*   EOS  --  0      Cardiac Enzymes No results found for: CPK, CK, CKMMB, CKMB, RCK3, CKMBT, CKNDX, CKND1, ANGELA, TROPT, TROIQ, DEMOND, TROPT, TNIPOC, BNP, BNPP   Coagulation No results for input(s): PTP, INR, APTT, INREXT in the last 72 hours. Lipid Panel Lab Results   Component Value Date/Time    Cholesterol, total 168 09/01/2017 01:00 PM    HDL Cholesterol 67 09/01/2017 01:00 PM    LDL, calculated 83 09/01/2017 01:00 PM    VLDL, calculated 18 09/01/2017 01:00 PM    Triglyceride 88 09/01/2017 01:00 PM    CHOL/HDL Ratio 3.1 06/23/2017 04:15 AM      BNP No results found for: BNP, BNPP, XBNPT   Liver Enzymes No results for input(s): TP, ALB, TBIL, AP in the last 72 hours.     No lab exists for component: SGOT, GPT, DBIL   Digoxin    Thyroid Studies Lab Results   Component Value Date/Time    TSH 2.40 07/28/2020 03:08 PM          Signed By: Mely Joya DO     August 2, 2020

## 2020-08-02 NOTE — PROGRESS NOTES
Problem: Falls - Risk of  Goal: *Absence of Falls  Description: Document Mateusz Daniel Fall Risk and appropriate interventions in the flowsheet. Outcome: Progressing Towards Goal  Note: Fall Risk Interventions:       Mentation Interventions: Adequate sleep, hydration, pain control, Bed/chair exit alarm, Door open when patient unattended, Evaluate medications/consider consulting pharmacy, More frequent rounding, Reorient patient, Room close to nurse's station, Update white board, Increase mobility    Medication Interventions: Evaluate medications/consider consulting pharmacy, Bed/chair exit alarm    Elimination Interventions: Call light in reach, Bed/chair exit alarm, Toileting schedule/hourly rounds, Patient to call for help with toileting needs              Problem: Patient Education: Go to Patient Education Activity  Goal: Patient/Family Education  Outcome: Progressing Towards Goal     Problem: Pressure Injury - Risk of  Goal: *Prevention of pressure injury  Description: Document Daniel Scale and appropriate interventions in the flowsheet. Outcome: Progressing Towards Goal  Note: Pressure Injury Interventions:  Sensory Interventions: Assess changes in LOC, Assess need for specialty bed, Avoid rigorous massage over bony prominences, Float heels, Keep linens dry and wrinkle-free, Maintain/enhance activity level, Minimize linen layers, Pad between skin to skin, Pressure redistribution bed/mattress (bed type), Turn and reposition approx.  every two hours (pillows and wedges if needed), Discuss PT/OT consult with provider    Moisture Interventions: Absorbent underpads, Apply protective barrier, creams and emollients, Assess need for specialty bed, Internal/External urinary devices, Minimize layers, Moisture barrier, Maintain skin hydration (lotion/cream), Check for incontinence Q2 hours and as needed    Activity Interventions: Assess need for specialty bed, Pressure redistribution bed/mattress(bed type), PT/OT evaluation    Mobility Interventions: Assess need for specialty bed, Float heels, HOB 30 degrees or less, Pressure redistribution bed/mattress (bed type), PT/OT evaluation, Turn and reposition approx.  every two hours(pillow and wedges)    Nutrition Interventions: Document food/fluid/supplement intake, Discuss nutritional consult with provider, Offer support with meals,snacks and hydration    Friction and Shear Interventions: Apply protective barrier, creams and emollients, Foam dressings/transparent film/skin sealants, HOB 30 degrees or less, Lift sheet                Problem: Patient Education: Go to Patient Education Activity  Goal: Patient/Family Education  Outcome: Progressing Towards Goal     Problem: Aspiration - Risk of  Goal: *Absence of aspiration  Outcome: Progressing Towards Goal     Problem: Patient Education: Go to Patient Education Activity  Goal: Patient/Family Education  Outcome: Progressing Towards Goal     Problem: Hypertension  Goal: *Blood pressure within specified parameters  Outcome: Progressing Towards Goal  Goal: *Fluid volume balance  Outcome: Progressing Towards Goal  Goal: *Labs within defined limits  Outcome: Progressing Towards Goal     Problem: Patient Education: Go to Patient Education Activity  Goal: Patient/Family Education  Outcome: Progressing Towards Goal

## 2020-08-02 NOTE — PROGRESS NOTES
Bedside shift change report given to this RN (oncoming nurse) by Henry Silver RN (offgoing nurse).  Report included the following information SBAR, Kardex and Cardiac Rhythm SR.

## 2020-08-03 LAB
BACTERIA SPEC CULT: NORMAL
GLUCOSE BLD STRIP.AUTO-MCNC: 119 MG/DL (ref 70–110)
GLUCOSE BLD STRIP.AUTO-MCNC: 121 MG/DL (ref 70–110)
GLUCOSE BLD STRIP.AUTO-MCNC: 68 MG/DL (ref 70–110)
GLUCOSE BLD STRIP.AUTO-MCNC: 71 MG/DL (ref 70–110)
GLUCOSE BLD STRIP.AUTO-MCNC: 72 MG/DL (ref 70–110)
GLUCOSE BLD STRIP.AUTO-MCNC: 77 MG/DL (ref 70–110)
GLUCOSE BLD STRIP.AUTO-MCNC: 85 MG/DL (ref 70–110)
SERVICE CMNT-IMP: NORMAL

## 2020-08-03 PROCEDURE — 74011250637 HC RX REV CODE- 250/637: Performed by: HOSPITALIST

## 2020-08-03 PROCEDURE — 74011250636 HC RX REV CODE- 250/636: Performed by: HOSPITALIST

## 2020-08-03 PROCEDURE — 92526 ORAL FUNCTION THERAPY: CPT

## 2020-08-03 PROCEDURE — 65660000000 HC RM CCU STEPDOWN

## 2020-08-03 PROCEDURE — 82962 GLUCOSE BLOOD TEST: CPT

## 2020-08-03 PROCEDURE — 74011000258 HC RX REV CODE- 258: Performed by: HOSPITALIST

## 2020-08-03 PROCEDURE — 97164 PT RE-EVAL EST PLAN CARE: CPT

## 2020-08-03 PROCEDURE — 74011250637 HC RX REV CODE- 250/637: Performed by: INTERNAL MEDICINE

## 2020-08-03 RX ORDER — INSULIN GLARGINE 100 [IU]/ML
4 INJECTION, SOLUTION SUBCUTANEOUS DAILY
Status: DISCONTINUED | OUTPATIENT
Start: 2020-08-04 | End: 2020-08-04 | Stop reason: HOSPADM

## 2020-08-03 RX ADMIN — ENOXAPARIN SODIUM 40 MG: 40 INJECTION SUBCUTANEOUS at 11:10

## 2020-08-03 RX ADMIN — CEFTRIAXONE 1 G: 1 INJECTION, POWDER, FOR SOLUTION INTRAMUSCULAR; INTRAVENOUS at 18:25

## 2020-08-03 RX ADMIN — Medication 10 ML: at 00:18

## 2020-08-03 RX ADMIN — Medication 10 ML: at 21:00

## 2020-08-03 RX ADMIN — SODIUM CHLORIDE 100 ML/HR: 450 INJECTION, SOLUTION INTRAVENOUS at 16:27

## 2020-08-03 RX ADMIN — METOPROLOL TARTRATE 50 MG: 50 TABLET, FILM COATED ORAL at 11:10

## 2020-08-03 RX ADMIN — Medication 10 ML: at 14:00

## 2020-08-03 RX ADMIN — Medication 10 ML: at 06:00

## 2020-08-03 RX ADMIN — METOPROLOL TARTRATE 50 MG: 50 TABLET, FILM COATED ORAL at 18:25

## 2020-08-03 NOTE — DIABETES MGMT
GLYCEMIC CONTROL:  Suggest decreasing Lantus to 4 units/day for hypoglycemia 8/2 and 8/3. Noted pt no longer receiving dexamethasone.   Recent Blood Glucose:  8/3:  85, 72, 68, 71,77  8/2:  89, 125, 95, 43/46/58/136 mg/dL - received 8 units Lantus          Marston Hatchet, RD, CDE   Office:  [address] [place] [address] Pager:  [phone]

## 2020-08-03 NOTE — PROGRESS NOTES
[facility]  -  Progress Note Patient: Nneka Broussard MRN: [id number]  SSN: [id number] YOB: 1955  Age: 77 y.o. Sex: female Admit Date: 7/28/2020 Assessment:  
 
-Paroxysmal SVT 
-Echo 8/1/2020: LV with normal cavity size, wall thickness and systolic function with EF 55-60%, normal wall motion and diastolic function, mild tricuspid regurgitation, moderate pulmonary hypertension with PASP 50 mmHg 
-HTN, uncontrolled 
-UTI, UCx with > 100K E. Coli 
-? CAP, faint subtle parenchymal opacities in R lung by CXR 7/28/2020 
-Hx hypothyroidism. TSH 2.40 on 7/28/2020. 
-Hx CVA Plan:  
 
-Continued on PO Lopressor. 
-Continued on Losartan, HCTZ, Amlodipine. 
-Continued on ASA, Pravachol. 
-Antibiotics per primary team. 
-No further recommendations from cardiac standpoint. Will be available as needed if additional concerns arise. Subjective:  
 
Somnolent, opens eyes to voice/touch. Objective:  
  
Patient Vitals for the past 8 hrs: 
 Temp Pulse Resp BP SpO2  
08/03/20 0758 98 °F (36.7 °C) 68 16 135/82 99 % Patient Vitals for the past 96 hrs: 
 Weight 08/01/20 0946 111 lb (50.3 kg) Intake/Output Summary (Last 24 hours) at 8/3/2020 1035 Last data filed at 8/3/2020 7606 Gross per 24 hour Intake 1448 ml Output 2600 ml Net -1152 ml Physical Exam: 
General:  Somnolent, opens eyes to voice/touch, no apparent distress, appears stated age Neck:  supple Lungs:  Coarse expiratory breath sounds Heart:  Regular rate and rhythm Abdomen:  abdomen is soft without significant tenderness, masses, organomegaly or guarding Extremities:  Atraumatic, no edema Data Review:  
 
Labs: Results:  
   
Chemistry Recent Labs  
  07/31/20 
3851 *   
K 4.1 * CO2 19*  
BUN 21* CREA 0.48* CA 10.3* AGAP 7  
BUCR 44* CBC w/Diff Recent Labs  
  07/31/20 
1522 WBC 8.6  
RBC 4.00* HGB 12.5 HCT 37.8  Lipid Panel Lab Results Component Value Date/Time Cholesterol, total 168 09/01/2017 01:00 PM  
 HDL Cholesterol 67 09/01/2017 01:00 PM  
 LDL, calculated 83 09/01/2017 01:00 PM  
 VLDL, calculated 18 09/01/2017 01:00 PM  
 Triglyceride 88 09/01/2017 01:00 PM  
 CHOL/HDL Ratio 3.1 06/23/2017 04:15 AM  
  
Thyroid Studies Lab Results Component Value Date/Time  TSH 2.40 07/28/2020 03:08 PM

## 2020-08-03 NOTE — PROGRESS NOTES
[facility]  -  Progress Note      Patient: Lukasz Rasheed MRN: [id number]  SSN: [id number]    YOB: 1955  Age: 77 y.o. Sex: female      Admit Date: 7/28/2020        I saw, evaluated, interviewed and examined the patient personally. I agree with the findings and plan of care as documented below with PA-C note  Continue with beta-blocker and other cardiac medication. No further sustained arrhythmia noted  We will be available as needed. Please call us back with any question    Melita Solis MD       Assessment:     -Paroxysmal SVT  -Echo 8/1/2020: LV with normal cavity size, wall thickness and systolic function with EF 55-60%, normal wall motion and diastolic function, mild tricuspid regurgitation, moderate pulmonary hypertension with PASP 50 mmHg  -HTN, uncontrolled  -UTI, UCx with > 100K E. Coli  -? CAP, faint subtle parenchymal opacities in R lung by CXR 7/28/2020  -Hx hypothyroidism. TSH 2.40 on 7/28/2020.  -Hx CVA    Plan:     -Continued on PO Lopressor.  -Continued on Losartan, HCTZ, Amlodipine.  -Continued on ASA, Pravachol.  -Antibiotics per primary team.  -No further recommendations from cardiac standpoint. Will be available as needed if additional concerns arise. Subjective:     Somnolent, opens eyes to voice/touch.     Objective:      Patient Vitals for the past 8 hrs:   Temp Pulse Resp BP SpO2   08/03/20 0758 98 °F (36.7 °C) 68 16 135/82 99 %         Patient Vitals for the past 96 hrs:   Weight   08/01/20 0946 111 lb (50.3 kg)         Intake/Output Summary (Last 24 hours) at 8/3/2020 1121  Last data filed at 8/3/2020 6764  Gross per 24 hour   Intake 1448 ml   Output 2600 ml   Net -1152 ml       Physical Exam:  General:  Somnolent, opens eyes to voice/touch, no apparent distress, appears stated age  Neck:  supple  Lungs:  Coarse expiratory breath sounds  Heart:  Regular rate and rhythm  Abdomen:  abdomen is soft without significant tenderness, masses, organomegaly or guarding  Extremities:  Atraumatic, no edema     Data Review:     Labs: Results:       Chemistry Recent Labs     07/31/20  1522   *      K 4.1   *   CO2 19*   BUN 21*   CREA 0.48*   CA 10.3*   AGAP 7   BUCR 44*      CBC w/Diff Recent Labs     07/31/20  1522   WBC 8.6   RBC 4.00*   HGB 12.5   HCT 37.8         Lipid Panel Lab Results   Component Value Date/Time    Cholesterol, total 168 09/01/2017 01:00 PM    HDL Cholesterol 67 09/01/2017 01:00 PM    LDL, calculated 83 09/01/2017 01:00 PM    VLDL, calculated 18 09/01/2017 01:00 PM    Triglyceride 88 09/01/2017 01:00 PM    CHOL/HDL Ratio 3.1 06/23/2017 04:15 AM      Thyroid Studies Lab Results   Component Value Date/Time    TSH 2.40 07/28/2020 03:08 PM

## 2020-08-03 NOTE — PROGRESS NOTES
Problem: Discharge Planning  Goal: *Discharge to safe environment  Outcome: Progressing Towards Goal Home with possible Home Health and resumption of Personal Care thru [facility] 5 hours daily 5 days/week VS SNF         I called daughter Jennifer Vences. I discussed therapy recommending home health. [facility] & [address] Provider list has been given to the patient and/or patient representative. Patient and/or patient representative has signed the [place] of Choice selecting ____[facility]______as their preference agency and a copy given. Both Home Health Provider list and Freedom of Choice have been placed on the chart. Patient is active with that company  I called to [address] [phone] and spoke to [employer]. Posted in [employer]. She said pt is to possibly dc tomorrow. She asked that [facility] transport be set up to go home. Lesly Saxena.  Robbi President, BSN  [facility]  [phone], Pager [phone]  [email or web address]

## 2020-08-03 NOTE — PROGRESS NOTES
Internal Medicine Progress Note    Patient's Name: Wanda Mays  Admit Date: 7/28/2020  Length of Stay: 6      Assessment/Plan     C/Beatriz Sood 1106 Problems    Diagnosis Date Noted    PSVT (paroxysmal supraventricular tachycardia) ([facility] [address] 07/30/2020    Acute metabolic encephalopathy 75/93/5857    History of CVA (cerebrovascular accident) 07/28/2020     r sided weakness per ED      CAP (community acquired pneumonia) 07/28/2020    Close exposure to COVID-19 virus 07/28/2020    Hypothyroid 10/28/2014    HTN (hypertension) 10/28/2014     - Cont IVAB  - Remains afebrile no leukocytosis  - BP much better  - Cont current BP regimen  - Appreciate cardio assistance  - PT/OT  - Cont acceptable home medications for chronic conditions   - DVT protocol    I have personally reviewed all pertinent labs and films that have officially resulted over the last 24 hours. I have personally checked for all pending labs that are awaiting final results. Subjective     Pt s/e @ bedside  No major events overnight  No changes on exam  Confused  Bedbound    Objective     Visit Vitals  /80   Pulse 70   Temp 98.1 °F (36.7 °C)   Resp 16   Ht 5' 3\" (1.6 m)   Wt 50.3 kg (111 lb)   SpO2 100%   BMI 19.66 kg/m²       Physical Exam:  General Appearance: NAD, confused at baseline  HEENT: AT/NC, dry mucus membrane  Lungs: CTA with normal respiratory effort  CV: RRR, no m/r/g  Abdomen: soft, non-tender, normal bowel sounds  Extremities: no cyanosis, no peripheral edema    Lab/Data Reviewed:  BMP:   No results found for: NA, K, CL, CO2, AGAP, GLU, BUN, CREA, GFRAA, GFRNA  CBC:   No results found for: WBC, HGB, HGBEXT, HCT, HCTEXT, PLT, PLTEXT, HGBEXT, HCTEXT, PLTEXT    Imaging Reviewed:  No results found.     Medications Reviewed:  Current Facility-Administered Medications   Medication Dose Route Frequency    [START ON 8/4/2020] insulin glargine (LANTUS) injection 4 Units  4 Units SubCUTAneous DAILY    metoprolol tartrate (LOPRESSOR) tablet 50 mg  50 mg Oral BID    losartan (COZAAR) tablet 100 mg  100 mg Oral DAILY    And    hydroCHLOROthiazide (HYDRODIURIL) tablet 12.5 mg  12.5 mg Oral DAILY    hydrALAZINE (APRESOLINE) 20 mg/mL injection 20 mg  20 mg IntraVENous Q6H PRN    nitroglycerin (NITROSTAT) tablet 0.4 mg  0.4 mg SubLINGual PRN    cefTRIAXone (ROCEPHIN) 1 g in 0.9% sodium chloride (MBP/ADV) 50 mL MBP  1 g IntraVENous Q24H    0.45% sodium chloride infusion  100 mL/hr IntraVENous CONTINUOUS    amLODIPine (NORVASC) tablet 10 mg  10 mg Oral DAILY    aspirin delayed-release tablet 81 mg  81 mg Oral DAILY    pravastatin (PRAVACHOL) tablet 10 mg  10 mg Oral QHS    sodium chloride (NS) flush 5-40 mL  5-40 mL IntraVENous Q8H    sodium chloride (NS) flush 5-40 mL  5-40 mL IntraVENous PRN    acetaminophen (TYLENOL) tablet 650 mg  650 mg Oral Q6H PRN    Or    acetaminophen (TYLENOL) suppository 650 mg  650 mg Rectal Q6H PRN    polyethylene glycol (MIRALAX) packet 17 g  17 g Oral DAILY PRN    promethazine (PHENERGAN) tablet 12.5 mg  12.5 mg Oral Q6H PRN    Or    ondansetron (ZOFRAN) injection 4 mg  4 mg IntraVENous Q6H PRN    enoxaparin (LOVENOX) injection 40 mg  40 mg SubCUTAneous DAILY    insulin lispro (HUMALOG) injection   SubCUTAneous AC&HS    glucose chewable tablet 16 g  4 Tab Oral PRN    glucagon (GLUCAGEN) injection 1 mg  1 mg IntraMUSCular PRN           Baron Dylon DO  Internal Medicine, Hospitalist  Pager: [phone]  [facility]

## 2020-08-03 NOTE — PROGRESS NOTES
Nutrition Assessment     Type and Reason for Visit: Reassess    Nutrition Recommendations/Plan:  1. Continue current diet: DYSPHAGIA PUREED (NDD1)  2 Nectar/2 Mildly Thick per SLP  2. Initiate oral nutritional supplements: Trial Magic Cup v/s Ensure Enlive at dinner tonight  3. Monitor labs, weight and document all PO intake  4. Feeding Assist     Nutrition Assessment:  SLP following: on (8/3) diet advanced and recommending puree/nectar-thick liquid diet initiation with plan for MBS tomorrow. Pt seen in room this afternoon and was nonverbal but would shake her head. Noted refused meals today. Appetite/PO intake has been poor overall per vitals. Spoke with RN, Silvino Maxwell, about trial of nutritional supplements with dinner tonight and placed in orders. Malnutrition Assessment:  Malnutrition Status: Moderate malnutrition     Nutrition History and Allergies: NKFA unable to determine PTA PO intake but predicted inadequate. Noted per documented weight Hx was 163 lb in Aug 2019 equating to ~40 lb or 25% loss x 1 year. Estimated Daily Nutrient Needs:  Energy (kcal):  7543-8129  Protein (g):  66-73       Fluid (ml/day):  1 mL/kcal    Nutrition Related Findings:  Pt was non verbal during visit and noted to be bed bound. Last BM on (7/31) per I/Os. Plan for MBS tomorrow; Feeder.       Current Nutrition Therapies:  DIET DYSPHAGIA PUREED (NDD1)  2 [place]/2 Mildly Thick    Anthropometric Measures:  · Height:  5' 3\" (160 cm)  · Current Body Wt:  55.2 kg (121 lb 12.8 oz)  · BMI: 21.6    Nutrition Diagnosis:   · Inadequate protein-energy intake related to cognitive or neurological impairment, swallowing difficulty as evidenced by intake 0-25%(SLP evaluation)      · Moderate malnutrition, In context of chronic illness related to inadequate protein-energy intake as evidenced by weight loss greater than or equal to 20% in 1 year, mild loss of subcutaneous fat, intake 0-25%(this admission)    Nutrition Intervention:  Food and/or Nutrient Delivery: Continue current diet, Start oral nutrition supplement  Nutrition Education and Counseling: No recommendations at this time  Coordination of Nutrition Care: Continued inpatient monitoring, Feeding assistance/environmental change, Speech therapy, Swallow evaluation    Goals:  PO nutrition intake will meet >75% of patient estimated nutritional needs within the next 7 days. Nutrition Monitoring and Evaluation:   Food/Nutrient Intake Outcomes: Diet advancement/tolerance, Food and nutrient intake, Supplement intake, IVF intake  Physical Signs/Symptoms Outcomes: Biochemical data, Chewing or swallowing, GI status, Meal time behavior, Weight, Nutrition focused physical findings    Discharge Planning:     Too soon to determine     Yousuf Alcaraz, [facility]  Pager: [phone]

## 2020-08-03 NOTE — PROGRESS NOTES
Problem: Mobility Impaired (Adult and Pediatric)  Goal: *Acute Goals and Plan of Care (Insert Text)  Description: Physical Therapy Goals  Re-assessed 8/3/2020 and to be accomplished within 7 day(s)  1. Patient will scoot up and down in bed with total assistance to aid in positioning. 2.  Patient will maintain BLE ROM/strength via functional maintenance program to prepare for OOB activity. Initiated 8/2/2020 and to be accomplished within 7 day(s)  1. Patient will move from supine to sit and sit to supine , scoot up and down, and roll side to side in bed with moderate assistance . 2.  Patient will transfer from bed to chair and chair to bed with moderate assistance  using the least restrictive device. 3.  Patient will perform sit to stand with moderate assistance . 4. Patient will ambulate with moderate assistance  for 10 feet with the least restrictive device. Outcome: Progressing Towards Goal   PHYSICAL THERAPY RE-EVALUATION    Patient: Margarita Qureshi (79 y.o. female)  Date: 8/3/2020  Primary Diagnosis: Acute metabolic encephalopathy [F43.81]  Precautions: Fall  PLOF: Non-ambulatory per chart  ASSESSMENT :  Re-evaluation s/p change in plan of care; goals reviewed and updated as indicated. Per chart review, patient is non-ambulatory with home health care. Presents sleeping, eyes open briefly to tactile noxious stimulation, sternal rub and pain response to RLE. Non-verbal; nods head yes/no appropriately 50% of questions. No command following. No AROM BLE. PROM completed, WFL. Call bell in reach. Would benefit from functional maintenance program to maintain BLE ROM/strength to aid in positioning and preparation for out of bed activity. Rehab tech educated on proper technique and demonstrated understanding.       PLAN :  Recommendations and Planned Interventions:  [x]           Bed Mobility Training             [x]    Neuromuscular Re-Education  [x]           Transfer Training                   [] Orthotic/Prosthetic Training  [x]           Gait Training                          []    Modalities  [x]           Therapeutic Exercises           []    Edema Management/Control  [x]           Therapeutic Activities            [x]    Family Training/Education  [x]           Patient Education  []           Other (comment):    Frequency/Duration: Patient will continue to be followed 3-5x/week via rehab tech for functional maintenance program, weekly by PT for re-assessment. Discharge Recommendations: Home Health (prior services); Guy Rosenbaum is unable to re-establish previous care at home  Further Equipment Recommendations for Discharge: hospital bed and wheelchair      SUBJECTIVE:   Eyes open and grimace to pain    OBJECTIVE DATA SUMMARY:     Past Medical History:   Diagnosis Date    Diabetes ([facility] [address]     Hypertension     Echo 6-25-15- borderline LVH, normal EF- CAV     Hypothyroid      Past Surgical History:   Procedure Laterality Date    HX  SECTION          HX CHOLECYSTECTOMY      HX COLONOSCOPY      HX PARATHYROIDECTOMY      had 2 glands removed    HX THYROIDECTOMY         Critical Behavior:  Neurologic State: Eyes open to stimulus; Eyes open to pain  Orientation Level: Unable to verbalize  Cognition: No command following     Psychosocial  Patient Behaviors: Lethargic    Strength:    No AROM  Range Of Motion:  No AROM BLE; PROM WFL BLE  Therapeutic Exercises:   PROM BLE hip/knee flexion, knee extension, hip abduction  Pain:  Pain level pre-treatment: 0/10   Pain level post-treatment:0/10     Activity Tolerance:   Poor     After treatment:   []         Patient left in no apparent distress sitting up in chair  [x]         Patient left in no apparent distress in bed  [x]         Call bell left within reach  [x]         Nursing notified  []         Caregiver present  []         Bed alarm activated  []         SCDs applied    COMMUNICATION/EDUCATION:   [x]         Role of physical therapy in the acute care setting. []         Fall prevention education was provided and the patient/caregiver indicated understanding. []         Patient/family have participated as able in goal setting and plan of care. []         Patient/family agree to work toward stated goals and plan of care. []         Patient understands intent and goals of therapy, but is neutral about his/her participation. [x]         Patient is unable to participate in goal setting/plan of care: ongoing with therapy staff.     Thank you for this referral.  Saintclair Lane, PT   Time Calculation: 8 mins

## 2020-08-03 NOTE — PROGRESS NOTES
--Bedside and Verbal shift change report given to AVELINA OBRIEN (oncoming nurse) by Darnell Bryan (offgoing nurse). Report included the following information SBAR, Kardex, MAR and Recent Results. 1100 REFUSED MEDICATIONS ORALLY, AND REFUSED BREAKFAST AND DRINK WHEN OFFERED. subcutaneous  medications administered, pt tolerated with ease, will continue to monitor. Patient somnelent. Has little to no muscle tone. 1300 Shift reassessment, pt condition unchanged, will continue to monitor. PATIENT REFUSED SNACK WILL RE-OFFER.       1605 PATIENT in bed gave oral care and washed her face to help arouse her gave 2 packets of sugar in a spoonfull of applesauce. Will offer icecream for dinner. Shift reassessment, pt condition unchanged, will continue to monitor. -- Bedside, Verbal and Written shift change report given to +++  (oncoming nurse) by Zunilda Valladares (offgoing nurse). Report included the following information SBAR, Kardex, Intake/Output and Recent Results.

## 2020-08-03 NOTE — PROGRESS NOTES
Patient is unable to communicate at this time. Patient resting, asleep. Will follow up.  offered prayer. Chaplains will continue to follow and will provide pastoral care on an as needed/requested basis.     [address] [facility]   Staff [address] [place] [address]   [phone]

## 2020-08-03 NOTE — PROGRESS NOTES
Problem: Dysphagia (Adult)  Goal: *Acute Goals and Plan of Care (Insert Text)  Description: Recommendations:  Diet: full liquids/nectar-thick  Meds: via IV  Aspiration Precautions  Oral Care TID    Goals:  Patient will:  1. Tolerate diet and/or diet upgrade without overt s/sx of aspiration under SLP supervision  2. Utilize compensatory swallow strategies of small bite/sip, alternate liquid/solid with min cues   3. Perform oral-motor and laryngeal elevation exercises 10 reps/each to increase oropharyngeal swallow function with min cues  4. Complete an objective swallow study (i.e., MBSS) to assess swallow integrity, r/o aspiration, and determine of safest LRD, min A      Outcome: Not Progressing Towards Goal     SPEECH LANGUAGE PATHOLOGY DYSPHAGIA TREATMENT    Patient: Carter Sandhoff (1 y.o. female)  Date: 8/3/2020  Diagnosis: Acute metabolic encephalopathy [M56.28]   Acute metabolic encephalopathy       Precautions: aspiration  Fall     ASSESSMENT:  Follow up this am; d/w RN, Elba De La O. Pt with some communication with RN; minimal conversation with SLP. This am, SLP attempted po trials. Minimal acceptance of nectar-thick via tsp and x 2 of applesauce. Pt with poor intake; severely delayed A-P transit with lingual spread. Delayed swallow inititation with multiple swallows to clear noted to palpation. No aspiration s/s noted. Provided multimodal cues for fast effortful swallow; poor response to cueing; (+) reinforcement provided. At this time, SLP recommending puree/nectar-thick liquid diet initiation. MBS further recommended next day to objectively assess oropharyngeal swallow integrity, rule-out aspiration, and determine safest, least restrictive diet.        Progression toward goals:  []         Improving appropriately and progressing toward goals  [x]         Improving slowly and progressing toward goals  []         Not making progress toward goals and plan of care will be adjusted     PLAN:  Recommendations and Planned Interventions:  Patient continues to benefit from skilled intervention to address the above impairments. Continue treatment per established plan of care. Discharge Recommendations:  Home Health     SUBJECTIVE:   Patient stated nonverbal. OBJECTIVE:   Cognitive and Communication Status:  Neurologic State: Eyes open to stimulus, Eyes open to pain  Orientation Level: Unable to verbalize  Cognition: No command following           Dysphagia Treatment:   See above    PAIN:  Pain level pre-treatment: 0/10   Pain level post-treatment: 0/10     After treatment:   []              Patient left in no apparent distress sitting up in chair  [x]              Patient left in no apparent distress in bed  [x]              Call bell left within reach  [x]              Nursing notified  []              Family present  []              Caregiver present  []              Bed alarm activated      COMMUNICATION/EDUCATION:   [x] Aspiration precautions; swallow safety; compensatory techniques  [x]        Patient unable to participate in education; education ongoing with staff  []  Posted safety precautions in patient's room.   [] Oral-motor/laryngeal strengthening exercises      Les Zapien SLP  Time Calculation: 15 mins

## 2020-08-04 ENCOUNTER — APPOINTMENT (OUTPATIENT)
Dept: GENERAL RADIOLOGY | Age: 65
DRG: 193 | End: 2020-08-04
Attending: HOSPITALIST
Payer: MEDICARE

## 2020-08-04 VITALS
HEART RATE: 70 BPM | TEMPERATURE: 98.9 F | OXYGEN SATURATION: 100 % | BODY MASS INDEX: 19.67 KG/M2 | WEIGHT: 111 LBS | SYSTOLIC BLOOD PRESSURE: 159 MMHG | HEIGHT: 63 IN | DIASTOLIC BLOOD PRESSURE: 87 MMHG | RESPIRATION RATE: 18 BRPM

## 2020-08-04 LAB
ATRIAL RATE: 85 BPM
CALCULATED P AXIS, ECG09: 75 DEGREES
CALCULATED R AXIS, ECG10: 43 DEGREES
CALCULATED T AXIS, ECG11: 141 DEGREES
DIAGNOSIS, 93000: NORMAL
GLUCOSE BLD STRIP.AUTO-MCNC: 134 MG/DL (ref 70–110)
GLUCOSE BLD STRIP.AUTO-MCNC: 217 MG/DL (ref 70–110)
GLUCOSE BLD STRIP.AUTO-MCNC: 99 MG/DL (ref 70–110)
P-R INTERVAL, ECG05: 118 MS
Q-T INTERVAL, ECG07: 358 MS
QRS DURATION, ECG06: 74 MS
QTC CALCULATION (BEZET), ECG08: 426 MS
VENTRICULAR RATE, ECG03: 85 BPM

## 2020-08-04 PROCEDURE — 74011250637 HC RX REV CODE- 250/637: Performed by: INTERNAL MEDICINE

## 2020-08-04 PROCEDURE — 92526 ORAL FUNCTION THERAPY: CPT

## 2020-08-04 PROCEDURE — 74011636637 HC RX REV CODE- 636/637: Performed by: HOSPITALIST

## 2020-08-04 PROCEDURE — 74011000258 HC RX REV CODE- 258: Performed by: HOSPITALIST

## 2020-08-04 PROCEDURE — 74011250636 HC RX REV CODE- 250/636: Performed by: HOSPITALIST

## 2020-08-04 PROCEDURE — 82962 GLUCOSE BLOOD TEST: CPT

## 2020-08-04 PROCEDURE — 74011250637 HC RX REV CODE- 250/637: Performed by: HOSPITALIST

## 2020-08-04 PROCEDURE — 74011000255 HC RX REV CODE- 255: Performed by: HOSPITALIST

## 2020-08-04 PROCEDURE — 92611 MOTION FLUOROSCOPY/SWALLOW: CPT

## 2020-08-04 PROCEDURE — 74230 X-RAY XM SWLNG FUNCJ C+: CPT

## 2020-08-04 RX ORDER — METOPROLOL TARTRATE 50 MG/1
50 TABLET ORAL 2 TIMES DAILY
Qty: 60 TAB | Refills: 0 | Status: SHIPPED | OUTPATIENT
Start: 2020-08-04

## 2020-08-04 RX ORDER — DOXYCYCLINE 100 MG/1
100 TABLET ORAL 2 TIMES DAILY
Qty: 4 TAB | Refills: 0 | Status: SHIPPED | OUTPATIENT
Start: 2020-08-04 | End: 2020-08-06

## 2020-08-04 RX ADMIN — INSULIN LISPRO 4 UNITS: 100 INJECTION, SOLUTION INTRAVENOUS; SUBCUTANEOUS at 17:34

## 2020-08-04 RX ADMIN — LOSARTAN POTASSIUM 100 MG: 50 TABLET, FILM COATED ORAL at 10:06

## 2020-08-04 RX ADMIN — BARIUM SULFATE 15 ML: 400 PASTE ORAL at 10:58

## 2020-08-04 RX ADMIN — SODIUM CHLORIDE 100 ML/HR: 450 INJECTION, SOLUTION INTRAVENOUS at 05:32

## 2020-08-04 RX ADMIN — HYDROCHLOROTHIAZIDE 12.5 MG: 25 TABLET ORAL at 10:08

## 2020-08-04 RX ADMIN — BARIUM SULFATE 30 ML: 0.81 POWDER, FOR SUSPENSION ORAL at 10:58

## 2020-08-04 RX ADMIN — ASPIRIN 81 MG: 81 TABLET, FILM COATED ORAL at 09:00

## 2020-08-04 RX ADMIN — INSULIN GLARGINE 4 UNITS: 100 INJECTION, SOLUTION SUBCUTANEOUS at 09:58

## 2020-08-04 RX ADMIN — PROMETHAZINE HYDROCHLORIDE 12.5 MG: 25 TABLET ORAL at 10:06

## 2020-08-04 RX ADMIN — METOPROLOL TARTRATE 50 MG: 50 TABLET, FILM COATED ORAL at 09:58

## 2020-08-04 RX ADMIN — METOPROLOL TARTRATE 50 MG: 50 TABLET, FILM COATED ORAL at 17:34

## 2020-08-04 RX ADMIN — AMLODIPINE BESYLATE 10 MG: 10 TABLET ORAL at 09:58

## 2020-08-04 RX ADMIN — ENOXAPARIN SODIUM 40 MG: 40 INJECTION SUBCUTANEOUS at 09:58

## 2020-08-04 NOTE — PROGRESS NOTES
--Bedside and Verbal shift change report given to AVELINA OBRIEN (oncoming nurse) by Milagros Briseno (offgoing nurse). Report included the following information SBAR, Kardex, MAR and Recent Results. 1000 medications administered, pt tolerated with ease, will continue to monitor. patient is more talkative patient states \" MAN I WANT ME SOME WATER. \" patient c/o mouth pain   Patient drank half of ensure  1200 Shift reassessment, pt condition unchanged, will continue to monitor. PATIENT ATE MOST OF LUNCH PER cna 75%     1500 PATIENT TO BE DISCHARGED CHANGED TO 1730 Shift reassessment, pt condition unchanged, will continue to monitor. -- Bedside, Verbal and Written shift change report given to +++  (oncoming nurse) by Naomi Mcnally (offgoing nurse). Report included the following information SBAR, Kardex, MAR and Recent Results.

## 2020-08-04 NOTE — ROUTINE PROCESS
Bedside and Verbal shift change report given to [email or web address] RN (oncoming nurse) by Zelda Pearce RN (offgoing nurse). Report included the following information SBAR, Intake/Output, MAR, Recent Results and Cardiac Rhythm NSR.  
 
2200 Evening medication held, pt excessively somnolent. Rousable to voice.

## 2020-08-04 NOTE — PROGRESS NOTES
MBS completed without aspiration across all study trials, to include: successive swallows of thin liquids + straw; pudding; and cracker. Deficits included: incoordinated lingual function with labored A-P transit and lingual residue (specifically of cracker); premature spillage into valleculae and pyriforms; swallow delay of ~4 seconds consistently with pudding and cracker only; decreased hyolaryngeal excursion resulting in large volume residuals in both valleculae and pyriforms. 13 mm Ba+ tablet not attempted due to pt refusing further trials.  
  
Pt presents with moderate oropharyngeal dysphagia, as evidenced above, which places pt at risk for aspiration. At this time, pt continues safest for puree solid, thin liquid diet; meds crushed or whole in pudding. SLP utilized video of study for visual feedback and recommendations for pt; no evidence of learning; Results d/w RN, Cristian Manual. 
  
  
Treatment: 
Skilled therapy initiated: Educated pt on MBS results, aspiration precautions, and importance of compensatory swallow techniques to decrease aspiration risk (decrease rate of intake & sip/bite size, upright @HOB for all po intake and ~30 minutes after po). Maximum therapeutic gains met; safest, least restrictive diet achieved in current in-patient/acute setting. Accordingly, SLP to d/c intervention at this time.

## 2020-08-04 NOTE — CDMP QUERY
Patient admitted with Pneumonia, noted to need full assistance for ADL's. If possible, please document in progress notes and d/c summary if you are evaluating and/or treating any of the following: 
 
 
? 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 
? Other Explanation of clinical findings ? Clinically Undetermined (no explanation for clinical findings) The medical record reflects the following: 
   Risk Factors: 76 yo female with history of CVA, dementia @ baseline Clinical Indicators: Per PT note  8/2    She did not respond to verbal commands. PROM of B UE/LE was WNL. Total dependence for all bed mobility as she was unable to follow verbal commands and was totally flaccid Treatment: Physical therapy, total nursing care for ADL's Please clarify and document your clinical opinion in the progress notes and discharge summary including the definitive and/or presumptive diagnosis, (suspected or probable), related to the above clinical findings. Please include clinical findings supporting your diagnosis. REFERENCE: Functional quadriplegia is the inability to use ones limbs and ambulate due to extreme debility or frailty by another medical condition (MS, Alzheimer's or Parkinson's dementia, ALS, or severe rheumatoid arthritis, etc) without physical injury or damage to the spinal cord. Typically the patient requires total care.   The usual findings are: bedridden, inability to turn, unable to feed or groom self, urinary/fecal incontinence, flexion contractures. Thank you for your time, 
Rufino Calzada RN [facility] [phone]

## 2020-08-04 NOTE — DISCHARGE INSTRUCTIONS
DISCHARGE SUMMARY from Nurse    PATIENT INSTRUCTIONS:    After general anesthesia or intravenous sedation, for 24 hours or while taking prescription Narcotics:  · Limit your activities  · Do not drive and operate hazardous machinery  · Do not make important personal or business decisions  · Do  not drink alcoholic beverages  · If you have not urinated within 8 hours after discharge, please contact your surgeon on call. Report the following to your surgeon:  · Excessive pain, swelling, redness or odor of or around the surgical area  · Temperature over 100.5  · Nausea and vomiting lasting longer than 4 hours or if unable to take medications  · Any signs of decreased circulation or nerve impairment to extremity: change in color, persistent  numbness, tingling, coldness or increase pain  · Any questions    What to do at Home:  Recommended activity: Activity as tolerated    Diet: Pureed foods with medications crushed in applesauce or pudding. Keep pt head up while eating and at least 30 minutes after. If you experience any of the following symptoms worsening shortness of breath, fever, or chills, please follow up with primary care physician/emergency room. *  Please give a list of your current medications to your Primary Care Provider. *  Please update this list whenever your medications are discontinued, doses are      changed, or new medications (including over-the-counter products) are added. *  Please carry medication information at all times in case of emergency situations. These are general instructions for a healthy lifestyle:    No smoking/ No tobacco products/ Avoid exposure to second hand smoke  Surgeon General's Warning:  Quitting smoking now greatly reduces serious risk to your health.     Obesity, smoking, and sedentary lifestyle greatly increases your risk for illness    A healthy diet, regular physical exercise & weight monitoring are important for maintaining a healthy lifestyle    You may be retaining fluid if you have a history of heart failure or if you experience any of the following symptoms:  Weight gain of 3 pounds or more overnight or 5 pounds in a week, increased swelling in our hands or feet or shortness of breath while lying flat in bed. Please call your doctor as soon as you notice any of these symptoms; do not wait until your next office visit. The discharge information has been reviewed with the patient and daughter. The patient and daughter verbalized understanding. Discharge medications reviewed with the patient and daughter and appropriate educational materials and side effects teaching were provided. Patient armband removed and given to patient to take home.   Patient was informed of the privacy risks if armband lost or stolen

## 2020-08-04 NOTE — DISCHARGE SUMMARY
Internal Medicine Discharge Summary        Patient: Cristine Vásquez    YOB: 1955    Age:  77 y.o. Admit Date: 7/28/2020    Discharge Date: 8/4/2020    LOS:  LOS: 7 days     Discharge To:  Home    Consults: Cardiology    Admission Diagnoses: Acute metabolic encephalopathy [E58.69]    Discharge Diagnoses:    Problem List as of 8/4/2020 Date Reviewed: 8/29/2017          Codes Class Noted - Resolved    [facility] tachy[facility] ICD-10-CM: I47.1  ICD-9-CM: 427.0  7/30/2020 - Present        * (Principal) Acute metabolic encephalopathy JTS-86-AO: G93.41  ICD-9-CM: 348.31  7/28/2020 - Present        History of CVA (cerebrovascular accident) ICD-10-CM: Z86.73  ICD-9-CM: V12.54  7/28/2020 - Present    Overview Signed 7/28/2020  7:02 PM by Petra Hernandez MD     r sided weakness per ED             CAP (community acquired pneumonia) ICD-10-CM: J18.9  ICD-9-CM: 620  7/28/2020 - Present        Close exposure to COVID-19 virus ICD-10-CM: Z20.828  ICD-9-CM: V01.79  7/28/2020 - Present        Adjustment insomnia ICD-10-CM: F51.02  ICD-9-CM: 307.41  8/29/2017 - Present        Hospital discharge follow-up ICD-10-CM: Z09  ICD-9-CM: V67.59  6/27/2017 - Present        Balance problem ICD-10-CM: R26.89  ICD-9-CM: 781.99  6/27/2017 - Present        Weakness due to cerebrovascular accident (CVA) ICD-10-CM: [id number]  ICD-9-CM: Vivsage Amel  6/22/2017 - Present        Gait abnormality ICD-10-CM: R26.9  ICD-9-CM: 781.2  6/22/2017 - Present        Dysuria ICD-10-CM: R30.0  ICD-9-CM: 788.1  11/28/2016 - Present        Osteoarthritis of left patellofemoral joint ICD-10-CM: M17.12  ICD-9-CM: 715.36  11/7/2016 - Present        Primary osteoarthritis of first carpometacarpal joint of left hand ICD-10-CM: M18.12  ICD-9-CM: 715.14  11/7/2016 - Present        Hyperlipidemia with target LDL less than 100 ICD-10-CM: E78.5  ICD-9-CM: 272.4  8/26/2015 - Present        Abnormal finding on EKG ICD-10-CM: R94.31  ICD-9-CM: 794.31  5/27/2015 - Present        Hypothyroidism, postsurgical ICD-10-CM: E89.0  ICD-9-CM: 244.0  5/27/2015 - Present        Candidiasis, intertrigo ICD-10-CM: B37.2  ICD-9-CM: 112.3  11/26/2014 - Present        Hypokalemia ICD-10-CM: E87.6  ICD-9-CM: 276.8  10/29/2014 - Present        CKD (chronic kidney disease) stage 3, GFR 30-59 ml/min (HCC) ICD-10-CM: N18.3  ICD-9-CM: 585.3  10/29/2014 - Present        HTN (hypertension) ICD-10-CM: I10  ICD-9-CM: 401.9  10/28/2014 - Present        Hypothyroid ICD-10-CM: E03.9  ICD-9-CM: 244.9  10/28/2014 - Present        Diabetes mellitus type 2, uncomplicated ([facility] MMF-10-LO: E11.9  ICD-9-CM: 250.00  10/28/2014 - Present        Otitis media, chronic, suppurative ICD-10-CM: H66.3X9  ICD-9-CM: 382.3  10/28/2014 - Present        RESOLVED: Malignant hypertension ICD-10-CM: I10  ICD-9-CM: 401.0  6/3/2015 - 7/12/2015        RESOLVED: Well woman exam with routine gynecological exam ICD-10-CM: A07.988  ICD-9-CM: V72.31  5/27/2015 - 9/26/2019        RESOLVED: Colon cancer screening ICD-10-CM: Z12.11  ICD-9-CM: V76.51  5/27/2015 - 9/26/2019        RESOLVED: Physical exam, routine ICD-10-CM: Z00.00  ICD-9-CM: V70.0  10/28/2014 - 9/30/2019          Functional Quadriplegia in setting of CVA with hemeplegia  Moderate chronic malnutrition    Discharge Condition:  Improved    Procedures: None         HPI: Hien Saenz is a 77y.o. year old female who presents with  AMS. Patient apparently is able to talk to family at baseline, knows who they are and is oriented but for the past week she has been confused. She did not recognize her family members. She has a family member that has tested + for covid. In ED found possible pna on xray with mild hypoxia. Hospital Course:    Acute metabolic encephalopathy - Improved during admission.  Still a level of memory issues likely dementia related    CAP - Treated with IV azithromycin and rocephin with transition to doxy at discharge for 1 more day to complete 7 day treatment. She was afebrile and without leukocytosis. CoVID-19 was negative. Swallow evaluation done and passed MBS. Paroxysmal SVT - Episode of narrow complex arrhythmia with heart rate 190-200 on 0730. Given 2 10mg doses of IV cardizem which broke the arrhythmia. Cardiology consulted. Echo was normal. Started on metoprolol and no further episodes. The rest of the patient's chronic conditions were managed appropriately during their admission. They were medically stable at the time of discharge. Visit Vitals  BP (!) 162/94 (BP 1 Location: Right arm, BP Patient Position: At rest)   Pulse 74   Temp 98.1 °F (36.7 °C)   Resp 16   Ht 5' 3\" (1.6 m)   Wt 50.3 kg (111 lb)   SpO2 95%   BMI 19.66 kg/m²       Physical Exam at Discharge:  General Appearance: NAD, chronically ill-appearing  HENT: normocephalic/atraumatic, dry mucus membranes  Lungs: CTA with normal respiratory effort  CV: RRR, no m/r/g  Abdomen: soft, non-tender, normal bowel sounds  Extremities: no cyanosis, no peripheral edema  Psych: appropriate affect, alert and oriented to person, place    Labs Prior to Discharge:  Labs: Results:       Chemistry No results for input(s): GLU, NA, K, CL, CO2, BUN, CREA, CA, AGAP, BUCR, TBIL, AP, TP, ALB, GLOB, AGRAT in the last 72 hours. No lab exists for component: GPT   CBC w/Diff No results for input(s): WBC, RBC, HGB, HCT, PLT, GRANS, LYMPH, EOS, HGBEXT, HCTEXT, PLTEXT in the last 72 hours. Cardiac Enzymes No results for input(s): CPK, CKND1, ANGELA in the last 72 hours. No lab exists for component: CKRMB, TROIP   Coagulation No results for input(s): PTP, INR, APTT, INREXT in the last 72 hours.     Lipid Panel Lab Results   Component Value Date/Time    Cholesterol, total 168 09/01/2017 01:00 PM    HDL Cholesterol 67 09/01/2017 01:00 PM    LDL, calculated 83 09/01/2017 01:00 PM    VLDL, calculated 18 09/01/2017 01:00 PM    Triglyceride 88 09/01/2017 01:00 PM CHOL/HDL Ratio 3.1 06/23/2017 04:15 AM      BNP No results for input(s): BNPP in the last 72 hours. Liver Enzymes No results for input(s): TP, ALB, TBIL, AP in the last 72 hours. No lab exists for component: SGOT, GPT, DBIL   Thyroid Studies Lab Results   Component Value Date/Time    TSH 2.40 07/28/2020 03:08 PM            Significant Imaging:  Xr Swallow Func Video    Result Date: 8/4/2020  Modified Barium Swallow History: Feeding difficulties, cough, Dysphagia Technique: The procedure was performed with the speech pathologist. Different consistencies of barium tinged products were given to swallow during real time fluoroscopic observation. Findings: With thin consistency via straw, pt demonstrated [premature spillage without penetration or aspiration]. There was significant residua in the vallecula . With putting consistency , pt demonstrated [oral and swallowing delay and premature spillage without penetration or aspiration]. There was significant residua in the vallecula . Fluoroscopic time: 1.2 minutes Fluoroscopic dose (reference air kerma): 4.5 mGy     Impression: Abnormal modified swallow study as described. Please see full speech pathologist report to follow for discussion of findings and detailed recommendations. Videotape is available for review. Ct Head Wo Cont    Result Date: 7/28/2020  EXAM: CT head INDICATION: Rule out intracranial hemorrhage. COMPARISON: None. TECHNIQUE: Axial CT imaging of the head was performed without intravenous contrast. Standard multiplanar coronal and sagittal reformatted images were obtained and are included in interpretation. One or more dose reduction techniques were used on this CT: automated exposure control, adjustment of the mAs and/or kVp according to patient size, and iterative reconstruction techniques. The specific techniques used on this CT exam have been documented in the patient's electronic medical record.   Digital Imaging and Communications in Medicine (DICOM) format image data are available to nonaffiliated external healthcare facilities or entities on a secure, media free, reciprocally searchable basis with patient authorization for at least a 12-month period after this study. _______________ FINDINGS: BRAIN AND POSTERIOR FOSSA: Atrophy. Patchy periventricular, deep and subcortical white matter hypoattenuation which is nonspecific but likely represents chronic ischemic changes. No evidence of acute large vessel transcortical infarct or acute parenchymal hemorrhage. No midline shift or hydrocephalus. Probable old left parietal infarct. EXTRA-AXIAL SPACES AND MENINGES: There are no abnormal extra-axial fluid collections. CALVARIUM: Intact. SINUSES: Clear. OTHER: None. _______________     IMPRESSION: No acute intracranial abnormality. Xr Chest Port    Result Date: 7/28/2020  EXAM: XR CHEST PORT CLINICAL INDICATION/HISTORY: chest pain -Additional: None COMPARISON: None TECHNIQUE: Portable frontal view of the chest _______________ FINDINGS: SUPPORT DEVICES: None. HEART AND MEDIASTINUM: Cardiomediastinal silhouette within normal limits. LUNGS AND PLEURAL SPACES: Patient rotated to left. Faint subtle parenchymal opacities in the right lung, possibly artifact from rotation. No dense consolidation, large effusion or pneumothorax. _______________     IMPRESSION: Patient rotated to left. Faint subtle parenchymal opacities in the right lung, possibly artifact from rotation. Discharge Medications:     Current Discharge Medication List      START taking these medications    Details   doxycycline (ADOXA) 100 mg tablet Take 1 Tab by mouth two (2) times a day for 2 days. Qty: 4 Tab, Refills: 0      metoprolol tartrate (LOPRESSOR) 50 mg tablet Take 1 Tab by mouth two (2) times a day. Qty: 60 Tab, Refills: 0         CONTINUE these medications which have NOT CHANGED    Details   traZODone (DESYREL) 50 mg tablet TAKE 1 TAB BY MOUTH NIGHTLY.   Qty: 10 Tab, Refills: 0    Comments: Patient needs an appointment. Far due  Associated Diagnoses: Adjustment insomnia      amLODIPine (NORVASC) 10 mg tablet TAKE 1 TABLET BY MOUTH EVERY DAY  Qty: 30 Tab, Refills: 0    Comments: Patient needs to be seen      KLOR-CON M20 20 mEq tablet TAKE 1 TABLET BY MOUTH TWICE A DAY  Qty: 180 Tab, Refills: 1    Associated Diagnoses: Hypokalemia      pravastatin (PRAVACHOL) 10 mg tablet TAKE 1 TABLET BY MOUTH NIGHTLY  Qty: 90 Tab, Refills: 1    Associated Diagnoses: Hyperlipidemia with target LDL less than 100      losartan-hydroCHLOROthiazide (HYZAAR) 100-12.5 mg per tablet TAKE ONE TABLET BY MOUTH DAILY  Qty: 30 Tab, Refills: 5      clotrimazole-betamethasone (LOTRISONE) topical cream APPLY TO AFFECTED AREA 2 TIMES A DAY  Qty: 45 g, Refills: 1      glipiZIDE (GLUCOTROL) 5 mg tablet TAKE 1 TABLET BY MOUTH BEFORE BREAKFAST AND DINNER. Qty: 180 Tab, Refills: 1      metFORMIN (GLUCOPHAGE) 500 mg tablet TAKE 1 TABLET BY MOUTH TWO (2) TIMES DAILY (WITH MEALS). HOLD UNTIL Saturday 6/24  Qty: 180 Tab, Refills: 1    Associated Diagnoses: Type 2 diabetes mellitus without complication, without long-term current use of insulin (HCC)      aspirin delayed-release 81 mg tablet Take  by mouth daily. Activity: Activity as tolerated    Diet: Resume previous diet    Wound Care: None needed    Follow-up:   Please follow up with your PCP within 7 days to discuss your recent hospitalization. Patient to arrange.          Total time spent including time spent on final examination and discharge discussion, discharge documentation and records reviewed and medication reconciliation: > 30 minutes    Rodney Deal DO  Internal Medicine, Hospitalist  Pager: [phone]

## 2020-08-04 NOTE — PROGRESS NOTES
SPEECH PATHOLOGY MODIFIED BARIUM SWALLOW STUDY/TREATMENT   AND DISCHARGE    Patient: Radha Tobias (16 y.o. female)  Date: 8/4/2020  Primary Diagnosis: Acute metabolic encephalopathy [F52.61]       Precautions: aspiration  Fall    ASSESSMENT :  MBS completed without aspiration across all study trials, to include: successive swallows of thin liquids + straw and pudding. Deficits included: incoordinated lingual function (specifically woth pudding) with labored A-P transit and lingual residue with premature spillage into valleculae and pyriforms; consistent swallow delay of ~2 seconds; decreased hyolaryngeal excursion resulting in large volume residuals in both valleculae and pyriforms. Required liquid wash to clear; no aspiration. 13 mm Ba+ tablet not attempted due ineffective lingual function. Pt presents with mild-mod oropharyngeal dysphagia, as evidenced above, which places pt at risk for aspiration. At this time, pt safe for puree solid, thin liquid diet; meds crushed or whole in pudding. SLP utilized video of study for visual feedback and recommendations for pt; no evidence of learning; Results d/w RN. Treatment:  Skilled therapy initiated: Educated pt on MBS results, aspiration precautions, and importance of compensatory swallow techniques to decrease aspiration risk (decrease rate of intake & sip/bite size, upright @HOB for all po intake and ~30 minutes after po). Maximum therapeutic gains met; safest, least restrictive diet achieved in current in-patient/acute setting. Accordingly, SLP to d/c intervention at this time. PLAN :  Recommendations and Planned Interventions:  Puree/thin; meds crushed or whole in pudding. Frequency/Duration: Maximum therapeutic gains met; safest, least restrictive diet achieved. D/C ST intervention at this time. Discharge Recommendations: Home Health     SUBJECTIVE:   Patient stated let's go.     OBJECTIVE:     Past Medical History:   Diagnosis Date    Diabetes ([facility]     Hypertension     Echo 6-25-15- borderline LVH, normal EF- CAV     Hypothyroid      Past Surgical History:   Procedure Laterality Date    HX  SECTION          HX CHOLECYSTECTOMY      HX COLONOSCOPY      HX PARATHYROIDECTOMY      had 2 glands removed    HX THYROIDECTOMY       Home Situation:   Home Situation  Home Environment: Private residence  One/Two Story Residence: One story  Living Alone: No  Support Systems: Child(roverto)  Patient Expects to be Discharged to[de-identified] Private residence  Current DME Used/Available at Home: None  Diet prior to admission: unable to determine  Current Diet:  Puree/thin   Radiologist:  AASHISH           8-point Penetration-Aspiration Scale: Score 1    PAIN:  Pain level pre-treatment: 0/10   Pain level post-treatment: 0/10       COMMUNICATION/EDUCATION:   [x]  Patient educated regarding MBS results and diet recommendations. [x]  Patient/family have participated as able in goal setting and plan of care. []  Patient/family agree to work toward stated goals and plan of care. []  Patient understands intent and goals of therapy, but is neutral about his/her participation.   [x]  Patient is unable to participate in goal setting and plan of care; ongoing with staff    Thank you for this referral.  KALEIGH Mcdonnell  Time Calculation: 25 mins  MBS Time: 16 minutes  Treatment Time: 9 minutes

## 2020-08-04 NOTE — PROGRESS NOTES
1435 Discharge instructions printed and placed in pt belongings bag for daughter to review. Called daughter, Kamila Cerrato, but she was unavailable per her . Informed him that paperwork will be sent with pt on follow up and prescriptions. If any questions, to please have Mrs. Freeman call the nurses station. Also reviewed instructions with pt to include her in her own care. Mercado catheter was removed after discussion with Dr. Misa Gallegos. Peripheral iv's and telemetry monitor removed as well. Verified pt identification card and insurance card were in pt belongings bag. Plan for transport to home as scheduled by case management. ETA 1430.     1500 Received call from medical transport that transport is delayed until 1730. Primary nurse Sandie Muhammad and Lesa Virgen  made aware. Called pt daughter and informed her of transport time change and reviewed discharge instructions.

## 2020-08-04 NOTE — PROGRESS NOTES
Discharge/Transition Planning    Problem: Discharge Planning  Goal: *Discharge to safe environment  Outcome: Resolved/Met   Home with [address] [facility]    1300: Called [facility] for pt [place] Complete Medicaid and completed for Medicaid transport via stretcher. They would not allow [facility] transport. Scheduled for 230pm but they could not guarantee time. Asked to please call nurses station when 30 min out. Notified 2 central.   Sent updates to [facility]. Called pt daughter and updated.      Care Management Interventions  PCP Verified by CM: Yes(Shelia Babb([facility])seen last ~ 1 month ago))  Palliative Care Criteria Met (RRAT>21 & CHF Dx)?: No  Mode of Transport at Discharge: [facility] Time of Discharge: 1430  Transition of Care Consult (CM Consult): [address]: No  Reason Outside [place]: Patient already serviced by other home care/hospice agency  Physical Therapy Consult: Yes  Occupational Therapy Consult: Yes  Speech Therapy Consult: Yes  Current Support Network: Relative's Home  Confirm Follow Up Transport: Family  The Plan for Transition of Care is Related to the Following Treatment Goals : resolution of symptoms  The Patient and/or Patient Representative was Provided with a Choice of Provider and Agrees with the Discharge Plan?: Yes  Name of the Patient Representative Who was Provided with a Choice of Provider and Agrees with the Discharge Plan: Maryfrances Mohs (dtr)  Freedom of Choice List was Provided with Basic Dialogue that Supports the Patient's Individualized Plan of Care/Goals, Treatment Preferences and Shares the Quality Data Associated with the Providers?: Yes  Discharge Location  Discharge Placement: Home with home health

## 2020-08-06 LAB
BACTERIA SPEC CULT: NORMAL
SERVICE CMNT-IMP: NORMAL

## 2020-08-07 NOTE — CDMP QUERY
Pt presented with confusion. After study, this was metabolic encephalopathy due to pneumonia. Pt noted to have pneumonia. If possible, please document in the progress notes and discharge summary if you are evaluating and/or treating any of the following: ·         Pneumonia with sepsis (please indicate if POA) ·         Pneumonia only ·         Other, please specify ·         Clinically unable to determine The medical record reflects the following: 
 
Risk Factors:  78 yo female admitted with pneumonia Clinical Indicators: WBC 13.9 
=> CRP 1.4 
 
=>Hyponatremia with Sodium high 153 mmol/L Treatment: IV Zithromax and Rocephin, IV fluids Thank you for your time, 
Raymond Cruz RN [facility] [phone]